# Patient Record
Sex: MALE | Race: WHITE | NOT HISPANIC OR LATINO
[De-identification: names, ages, dates, MRNs, and addresses within clinical notes are randomized per-mention and may not be internally consistent; named-entity substitution may affect disease eponyms.]

---

## 2017-10-18 ENCOUNTER — APPOINTMENT (OUTPATIENT)
Dept: OPHTHALMOLOGY | Facility: CLINIC | Age: 72
End: 2017-10-18

## 2017-11-28 ENCOUNTER — APPOINTMENT (OUTPATIENT)
Dept: OPHTHALMOLOGY | Facility: CLINIC | Age: 72
End: 2017-11-28
Payer: MEDICARE

## 2017-11-28 PROCEDURE — 92015 DETERMINE REFRACTIVE STATE: CPT

## 2017-11-28 PROCEDURE — 92014 COMPRE OPH EXAM EST PT 1/>: CPT

## 2017-11-28 PROCEDURE — 92226: CPT | Mod: 50

## 2019-01-22 ENCOUNTER — APPOINTMENT (OUTPATIENT)
Dept: OPHTHALMOLOGY | Facility: CLINIC | Age: 74
End: 2019-01-22

## 2019-11-20 ENCOUNTER — NON-APPOINTMENT (OUTPATIENT)
Age: 74
End: 2019-11-20

## 2019-11-20 ENCOUNTER — APPOINTMENT (OUTPATIENT)
Dept: OPHTHALMOLOGY | Facility: CLINIC | Age: 74
End: 2019-11-20
Payer: MEDICARE

## 2019-11-20 PROCEDURE — 92014 COMPRE OPH EXAM EST PT 1/>: CPT

## 2020-07-29 ENCOUNTER — APPOINTMENT (OUTPATIENT)
Dept: OPHTHALMOLOGY | Facility: CLINIC | Age: 75
End: 2020-07-29

## 2020-09-02 ENCOUNTER — APPOINTMENT (OUTPATIENT)
Dept: OTOLARYNGOLOGY | Facility: CLINIC | Age: 75
End: 2020-09-02
Payer: MEDICARE

## 2020-09-02 VITALS
HEART RATE: 49 BPM | TEMPERATURE: 97.9 F | HEIGHT: 70 IN | WEIGHT: 250 LBS | SYSTOLIC BLOOD PRESSURE: 122 MMHG | BODY MASS INDEX: 35.79 KG/M2 | DIASTOLIC BLOOD PRESSURE: 58 MMHG

## 2020-09-02 PROCEDURE — 92567 TYMPANOMETRY: CPT

## 2020-09-02 PROCEDURE — 69210 REMOVE IMPACTED EAR WAX UNI: CPT

## 2020-09-02 PROCEDURE — 92557 COMPREHENSIVE HEARING TEST: CPT

## 2020-09-02 PROCEDURE — 99204 OFFICE O/P NEW MOD 45 MIN: CPT | Mod: 25

## 2020-09-02 PROCEDURE — 95992 CANALITH REPOSITIONING PROC: CPT | Mod: RT

## 2020-09-02 NOTE — PHYSICAL EXAM
[Fukuda Step Test] : Fukuda Step Test was Positive [Midline] : trachea located in midline position [Normal] : gait was normal [Denia-Hallsandyke] : [place]-Hallpike: Negative [Nystagmus] : ~T no ~M nystagmus was seen [de-identified] : weak step test [FreeTextEntry8] : wax [FreeTextEntry9] : wax

## 2020-09-02 NOTE — PROCEDURE
[Risk and Benefits Discussed] : The purpose, risks, discomforts, benefits and alternatives of the procedure have been explained to the patient including no treatment. [Cerumen Impaction] : Cerumen Impaction [] : Epley Maneuver [FreeTextEntry1] : Dizzy [FreeTextEntry6] : sl dizzy w/o nystagmus

## 2020-09-02 NOTE — HISTORY OF PRESENT ILLNESS
[Dizziness] : dizziness [Anxiety] : no anxiety [de-identified] : 74 yo male\par Patient complains of vertigo x 1 month. States when he bends down or lays supine the room starts spinning, lasting several seconds. Happens several times a day, last episode was this morning. Denies hearing loss or ear fullness with vertigo. \par Pt has no ear pain, ear drainage, hearing loss, tinnitus, nasal congestion, nasal discharge, epistaxis, sinus infections, facial pain, facial pressure, throat pain, dysphagia or fevers\par \par  [Hearing Loss] : no hearing loss [Headache] : no headache [Recurrent Otitis Media] : no recurrent otitis media [Otitis Media with Effusion] : no otitis media with effusion [Meniere Disease] : no Meniere disease [Congenital Ear Malformation] : no congenital ear malformation [Presbycusis] : no presbycusis [Perilymphatic Fistula] : no perilymphatic fistula [Otosclerosis] : no otosclerosis [Hypertension] : no hypertension [Loud Noise Exposure] : no history of loud noise exposure [Smoking] : no smoking [Stroke] : no stroke [Early Onset Hearing Loss] : no early onset hearing loss [Nasal Congestion] : no nasal congestion [Facial Pressure] : no facial pressure [Facial Pain] : no facial pain [Diplopia] : no diplopia [Ear Fullness] : no ear fullness [Allergic Rhinitis] : no allergic rhinitis [Maxillary Tooth Infection] : no maxillary tooth infection [Environmental Allergies] : no environmental allergies [Seasonal Allergies] : no seasonal allergies [Septal Deviation] : no septal deviation [Environmental Allergens] : no environmental allergens [Adenoidectomy] : no adenoidectomy [Asthma] : no asthma [Allergies] : no allergies [Nasal FB Removal] : no nasal foreign body removal [Neck Mass] : no neck mass [Neck Pain] : no neck pain [Chills] : no chills [Cold Intolerance] : no cold intolerance [Heat Intolerance] : no heat intolerance [Cough] : no cough [Fatigue] : no fatigue [Hyperthyroidism] : no hyperthyroidism [Sialadenitis] : no sialadenitis [Non-Hodgkin Lymphoma] : no non-hodgkin lymphoma [Hodgkin Disease] : no hodgkin disease [Tobacco Use] : no tobacco use [Graves Disease] : no graves disease [Alcohol Use] : no alcohol use [Thyroid Cancer] : no thyroid cancer

## 2020-09-02 NOTE — DATA REVIEWED
[de-identified] : bilat SNHL\par Hearing Test performed to evaluate the extent of hearing loss and  to explain pt's symptoms\par

## 2020-09-02 NOTE — ASSESSMENT
[FreeTextEntry1] : Wax:\par -Cerumen is removed from the right and left  ear canal with a curette and suction.\par -Rx:Debrox be placed in both ears on a routine basis to keep them free of wax.\par -Routine debridement suggested to keep the ears free of wax.\par \par Vestibulopathy:\par -Hearing Test performed to evaluate the extent of hearing loss and to explain pt's symptoms\par bilateral sensorineural hearing loss-cleared for hearing aids\par \par Rx-vestibular rehab \par poss vestib tetsing\par \par f/u

## 2020-10-23 ENCOUNTER — APPOINTMENT (OUTPATIENT)
Dept: OTOLARYNGOLOGY | Facility: CLINIC | Age: 75
End: 2020-10-23

## 2020-11-24 ENCOUNTER — NON-APPOINTMENT (OUTPATIENT)
Age: 75
End: 2020-11-24

## 2020-12-21 ENCOUNTER — APPOINTMENT (OUTPATIENT)
Dept: OTOLARYNGOLOGY | Facility: CLINIC | Age: 75
End: 2020-12-21
Payer: MEDICARE

## 2020-12-21 VITALS
TEMPERATURE: 97.3 F | HEIGHT: 70 IN | BODY MASS INDEX: 35.79 KG/M2 | DIASTOLIC BLOOD PRESSURE: 55 MMHG | HEART RATE: 48 BPM | SYSTOLIC BLOOD PRESSURE: 103 MMHG | WEIGHT: 250 LBS

## 2020-12-21 PROCEDURE — 99214 OFFICE O/P EST MOD 30 MIN: CPT

## 2020-12-21 RX ORDER — AMIODARONE HYDROCHLORIDE 400 MG/1
400 TABLET ORAL
Qty: 90 | Refills: 0 | Status: ACTIVE | COMMUNITY
Start: 2019-11-18

## 2020-12-21 RX ORDER — TAMSULOSIN HYDROCHLORIDE 0.4 MG/1
0.4 CAPSULE ORAL
Qty: 180 | Refills: 0 | Status: ACTIVE | COMMUNITY
Start: 2020-11-06

## 2020-12-21 RX ORDER — CANDESARTAN CILEXETIL 4 MG/1
4 TABLET ORAL
Qty: 90 | Refills: 0 | Status: ACTIVE | COMMUNITY
Start: 2020-09-09

## 2020-12-21 RX ORDER — MUPIROCIN 20 MG/G
2 OINTMENT TOPICAL
Qty: 22 | Refills: 0 | Status: ACTIVE | COMMUNITY
Start: 2020-10-23

## 2020-12-21 NOTE — HISTORY OF PRESENT ILLNESS
[Dizziness] : dizziness [No] : patient does not have a  history of radiation therapy [de-identified] : 76 yo male with vertigo presents for follow up. States he is going to vestibular therapy, states he is making 20% progress. Still not feeling his best but better. \par no other modifying factors\par \par \par \par \par  [Anxiety] : no anxiety [Headache] : no headache [Hearing Loss] : no hearing loss [Recurrent Otitis Media] : no recurrent otitis media [Otitis Media with Effusion] : no otitis media with effusion [Presbycusis] : no presbycusis [Congenital Ear Malformation] : no congenital ear malformation [Meniere Disease] : no Meniere disease [Otosclerosis] : no otosclerosis [Perilymphatic Fistula] : no perilymphatic fistula [Hypertension] : no hypertension [Loud Noise Exposure] : no history of loud noise exposure [Smoking] : no smoking [Early Onset Hearing Loss] : no early onset hearing loss [Stroke] : no stroke [Facial Pain] : no facial pain [Facial Pressure] : no facial pressure [Nasal Congestion] : no nasal congestion [Diplopia] : no diplopia [Ear Fullness] : no ear fullness [Allergic Rhinitis] : no allergic rhinitis [Maxillary Tooth Infection] : no maxillary tooth infection [Septal Deviation] : no septal deviation [Environmental Allergies] : no environmental allergies [Seasonal Allergies] : no seasonal allergies [Environmental Allergens] : no environmental allergens [Adenoidectomy] : no adenoidectomy [Nasal FB Removal] : no nasal foreign body removal [Allergies] : no allergies [Asthma] : no asthma [Neck Mass] : no neck mass [Neck Pain] : no neck pain [Chills] : no chills [Cold Intolerance] : no cold intolerance [Cough] : no cough [Fatigue] : no fatigue [Heat Intolerance] : no heat intolerance [Hyperthyroidism] : no hyperthyroidism [Sialadenitis] : no sialadenitis [Hodgkin Disease] : no hodgkin disease [Non-Hodgkin Lymphoma] : no non-hodgkin lymphoma [Tobacco Use] : no tobacco use [Alcohol Use] : no alcohol use [Graves Disease] : no graves disease [Thyroid Cancer] : no thyroid cancer

## 2020-12-21 NOTE — DATA REVIEWED
[de-identified] : bilat SNHL\par Hearing Test performed to evaluate the extent of hearing loss and  to explain pt's symptoms\par

## 2020-12-21 NOTE — PHYSICAL EXAM
[Midline] : trachea located in midline position [Normal] : gait was normal [Fukuda Step Test] : Fukuda Step Test was Positive [Nystagmus] : ~T no ~M nystagmus was seen [Denia-Hallsandyke] : [place]-Hallpike: Negative [de-identified] : weak step test

## 2020-12-21 NOTE — ASSESSMENT
[FreeTextEntry1] : 76 y/o male with vertigo going to vestibular rehab \par Vestibulopathy:\par -continue vestibular rehab \par \par \par f/u 2 months or  prn

## 2021-05-13 ENCOUNTER — APPOINTMENT (OUTPATIENT)
Dept: OTOLARYNGOLOGY | Facility: CLINIC | Age: 76
End: 2021-05-13
Payer: MEDICARE

## 2021-05-13 VITALS
SYSTOLIC BLOOD PRESSURE: 124 MMHG | TEMPERATURE: 97.6 F | DIASTOLIC BLOOD PRESSURE: 66 MMHG | WEIGHT: 250 LBS | BODY MASS INDEX: 35.79 KG/M2 | HEIGHT: 70 IN | HEART RATE: 50 BPM

## 2021-05-13 DIAGNOSIS — H81.11 BENIGN PAROXYSMAL VERTIGO, RIGHT EAR: ICD-10-CM

## 2021-05-13 DIAGNOSIS — H90.3 SENSORINEURAL HEARING LOSS, BILATERAL: ICD-10-CM

## 2021-05-13 DIAGNOSIS — R42 DIZZINESS AND GIDDINESS: ICD-10-CM

## 2021-05-13 DIAGNOSIS — H61.23 IMPACTED CERUMEN, BILATERAL: ICD-10-CM

## 2021-05-13 PROCEDURE — 99213 OFFICE O/P EST LOW 20 MIN: CPT

## 2021-05-13 NOTE — END OF VISIT
[FreeTextEntry3] : I personally saw and examined FARHAT ROMO in detail. I spoke to MAURICE Bliss regarding the assessment and plan of care. I reviewed the above assessment and plan of care, and agree. I have made changes in changes in the body of the note where appropriate.I personally reviewed the HPI, PMH, FH, SH, ROS and medications/allergies. I have spoken to MAURICE Bliss regarding the history and have personally determined the assessment and plan of care, and documented this myself. I was present and participated in all key portions of the encounter and all procedures noted above. I have made changes in the body of the note where appropriate.\par \par Attesting Faculty: See Attending Signature Below \par \par \par  [Time Spent: ___ minutes] : I have spent [unfilled] minutes of time on the encounter.

## 2021-05-13 NOTE — ASSESSMENT
[FreeTextEntry1] : 76 y/o male with vertigo presents for follow up, no improvement with vestibular rehab\par \par Vestibulopathy:\par -VNG and ABR ordered \par -Cawthorne's exercise advised \par \par f/u 1 month or prn \par

## 2021-05-13 NOTE — DATA REVIEWED
[de-identified] : bilat SNHL\par Hearing Test performed to evaluate the extent of hearing loss and  to explain pt's symptoms\par

## 2021-05-13 NOTE — PHYSICAL EXAM
[Midline] : trachea located in midline position [Normal] : gait was normal [Fukuda Step Test] : Fukuda Step Test was Positive [Nystagmus] : ~T no ~M nystagmus was seen [Denia-Hallsandyke] : [place]-Hallpike: Negative [de-identified] : weak step test

## 2021-05-13 NOTE — HISTORY OF PRESENT ILLNESS
[No] : patient does not have a  history of radiation therapy [Dizziness] : dizziness [de-identified] : 76 yo male with vertigo presents for follow up. States he went for vestibular rehab with no improvement of dizziness. He states its more pronounced on the right side. \par no other modifying factors\par \par \par \par \par  [Anxiety] : no anxiety [Headache] : no headache [Hearing Loss] : no hearing loss [Recurrent Otitis Media] : no recurrent otitis media [Otitis Media with Effusion] : no otitis media with effusion [Presbycusis] : no presbycusis [Congenital Ear Malformation] : no congenital ear malformation [Meniere Disease] : no Meniere disease [Otosclerosis] : no otosclerosis [Perilymphatic Fistula] : no perilymphatic fistula [Hypertension] : no hypertension [Loud Noise Exposure] : no history of loud noise exposure [Smoking] : no smoking [Early Onset Hearing Loss] : no early onset hearing loss [Stroke] : no stroke [Facial Pain] : no facial pain [Facial Pressure] : no facial pressure [Nasal Congestion] : no nasal congestion [Diplopia] : no diplopia [Ear Fullness] : no ear fullness [Allergic Rhinitis] : no allergic rhinitis [Maxillary Tooth Infection] : no maxillary tooth infection [Septal Deviation] : no septal deviation [Environmental Allergies] : no environmental allergies [Seasonal Allergies] : no seasonal allergies [Environmental Allergens] : no environmental allergens [Adenoidectomy] : no adenoidectomy [Nasal FB Removal] : no nasal foreign body removal [Allergies] : no allergies [Asthma] : no asthma [Neck Mass] : no neck mass [Neck Pain] : no neck pain [Chills] : no chills [Cold Intolerance] : no cold intolerance [Cough] : no cough [Fatigue] : no fatigue [Heat Intolerance] : no heat intolerance [Hyperthyroidism] : no hyperthyroidism [Sialadenitis] : no sialadenitis [Hodgkin Disease] : no hodgkin disease [Non-Hodgkin Lymphoma] : no non-hodgkin lymphoma [Tobacco Use] : no tobacco use [Alcohol Use] : no alcohol use [Graves Disease] : no graves disease [Thyroid Cancer] : no thyroid cancer

## 2021-06-04 ENCOUNTER — APPOINTMENT (OUTPATIENT)
Dept: OTOLARYNGOLOGY | Facility: CLINIC | Age: 76
End: 2021-06-04
Payer: MEDICARE

## 2021-06-04 PROCEDURE — ZZZZZ: CPT

## 2021-06-04 PROCEDURE — 92537 CALORIC VSTBLR TEST W/REC: CPT

## 2021-06-04 PROCEDURE — 92567 TYMPANOMETRY: CPT

## 2021-06-04 PROCEDURE — 92540 BASIC VESTIBULAR EVALUATION: CPT

## 2021-06-04 PROCEDURE — 92653 AEP NEURODIAGNOSTIC I&R: CPT

## 2021-06-04 PROCEDURE — 92547 SUPPLEMENTAL ELECTRICAL TEST: CPT

## 2021-06-11 ENCOUNTER — NON-APPOINTMENT (OUTPATIENT)
Age: 76
End: 2021-06-11

## 2021-08-18 ENCOUNTER — APPOINTMENT (OUTPATIENT)
Dept: HEPATOLOGY | Facility: CLINIC | Age: 76
End: 2021-08-18
Payer: MEDICARE

## 2021-08-18 DIAGNOSIS — R74.8 ABNORMAL LEVELS OF OTHER SERUM ENZYMES: ICD-10-CM

## 2021-08-18 PROCEDURE — 91200 LIVER ELASTOGRAPHY: CPT

## 2021-10-04 ENCOUNTER — APPOINTMENT (OUTPATIENT)
Dept: HEPATOLOGY | Facility: CLINIC | Age: 76
End: 2021-10-04

## 2023-11-30 ENCOUNTER — EMERGENCY (EMERGENCY)
Facility: HOSPITAL | Age: 78
LOS: 1 days | Discharge: HOME | End: 2023-11-30
Attending: STUDENT IN AN ORGANIZED HEALTH CARE EDUCATION/TRAINING PROGRAM
Payer: MEDICARE

## 2023-11-30 VITALS
DIASTOLIC BLOOD PRESSURE: 76 MMHG | HEART RATE: 64 BPM | SYSTOLIC BLOOD PRESSURE: 132 MMHG | HEIGHT: 67 IN | TEMPERATURE: 97 F | RESPIRATION RATE: 18 BRPM | OXYGEN SATURATION: 98 % | WEIGHT: 198.42 LBS

## 2023-11-30 DIAGNOSIS — Z98.89 OTHER SPECIFIED POSTPROCEDURAL STATES: Chronic | ICD-10-CM

## 2023-11-30 LAB
ALBUMIN SERPL ELPH-MCNC: 3.6 G/DL — SIGNIFICANT CHANGE UP (ref 3.5–5)
ALBUMIN SERPL ELPH-MCNC: 3.6 G/DL — SIGNIFICANT CHANGE UP (ref 3.5–5)
ALP SERPL-CCNC: 92 U/L — SIGNIFICANT CHANGE UP (ref 40–120)
ALP SERPL-CCNC: 92 U/L — SIGNIFICANT CHANGE UP (ref 40–120)
ALT FLD-CCNC: 106 U/L DA — HIGH (ref 10–60)
ALT FLD-CCNC: 106 U/L DA — HIGH (ref 10–60)
ANION GAP SERPL CALC-SCNC: 4 MMOL/L — LOW (ref 5–17)
ANION GAP SERPL CALC-SCNC: 4 MMOL/L — LOW (ref 5–17)
APPEARANCE UR: CLEAR — SIGNIFICANT CHANGE UP
APPEARANCE UR: CLEAR — SIGNIFICANT CHANGE UP
APTT BLD: 64.4 SEC — HIGH (ref 24.5–35.6)
APTT BLD: 64.4 SEC — HIGH (ref 24.5–35.6)
AST SERPL-CCNC: 74 U/L — HIGH (ref 10–40)
AST SERPL-CCNC: 74 U/L — HIGH (ref 10–40)
BACTERIA # UR AUTO: ABNORMAL /HPF
BACTERIA # UR AUTO: ABNORMAL /HPF
BASOPHILS # BLD AUTO: 0.06 K/UL — SIGNIFICANT CHANGE UP (ref 0–0.2)
BASOPHILS # BLD AUTO: 0.06 K/UL — SIGNIFICANT CHANGE UP (ref 0–0.2)
BASOPHILS NFR BLD AUTO: 0.5 % — SIGNIFICANT CHANGE UP (ref 0–2)
BASOPHILS NFR BLD AUTO: 0.5 % — SIGNIFICANT CHANGE UP (ref 0–2)
BILIRUB SERPL-MCNC: 0.6 MG/DL — SIGNIFICANT CHANGE UP (ref 0.2–1.2)
BILIRUB SERPL-MCNC: 0.6 MG/DL — SIGNIFICANT CHANGE UP (ref 0.2–1.2)
BILIRUB UR-MCNC: NEGATIVE — SIGNIFICANT CHANGE UP
BILIRUB UR-MCNC: NEGATIVE — SIGNIFICANT CHANGE UP
BUN SERPL-MCNC: 29 MG/DL — HIGH (ref 7–18)
BUN SERPL-MCNC: 29 MG/DL — HIGH (ref 7–18)
CALCIUM SERPL-MCNC: 9.5 MG/DL — SIGNIFICANT CHANGE UP (ref 8.4–10.5)
CALCIUM SERPL-MCNC: 9.5 MG/DL — SIGNIFICANT CHANGE UP (ref 8.4–10.5)
CHLORIDE SERPL-SCNC: 106 MMOL/L — SIGNIFICANT CHANGE UP (ref 96–108)
CHLORIDE SERPL-SCNC: 106 MMOL/L — SIGNIFICANT CHANGE UP (ref 96–108)
CO2 SERPL-SCNC: 28 MMOL/L — SIGNIFICANT CHANGE UP (ref 22–31)
CO2 SERPL-SCNC: 28 MMOL/L — SIGNIFICANT CHANGE UP (ref 22–31)
COLOR SPEC: YELLOW — SIGNIFICANT CHANGE UP
COLOR SPEC: YELLOW — SIGNIFICANT CHANGE UP
CREAT SERPL-MCNC: 1.53 MG/DL — HIGH (ref 0.5–1.3)
CREAT SERPL-MCNC: 1.53 MG/DL — HIGH (ref 0.5–1.3)
DIFF PNL FLD: ABNORMAL
DIFF PNL FLD: ABNORMAL
EGFR: 47 ML/MIN/1.73M2 — LOW
EGFR: 47 ML/MIN/1.73M2 — LOW
EOSINOPHIL # BLD AUTO: 0.16 K/UL — SIGNIFICANT CHANGE UP (ref 0–0.5)
EOSINOPHIL # BLD AUTO: 0.16 K/UL — SIGNIFICANT CHANGE UP (ref 0–0.5)
EOSINOPHIL NFR BLD AUTO: 1.4 % — SIGNIFICANT CHANGE UP (ref 0–6)
EOSINOPHIL NFR BLD AUTO: 1.4 % — SIGNIFICANT CHANGE UP (ref 0–6)
EPI CELLS # UR: SIGNIFICANT CHANGE UP
EPI CELLS # UR: SIGNIFICANT CHANGE UP
GLUCOSE SERPL-MCNC: 196 MG/DL — HIGH (ref 70–99)
GLUCOSE SERPL-MCNC: 196 MG/DL — HIGH (ref 70–99)
GLUCOSE UR QL: >=1000 MG/DL
GLUCOSE UR QL: >=1000 MG/DL
HCT VFR BLD CALC: 54 % — HIGH (ref 39–50)
HCT VFR BLD CALC: 54 % — HIGH (ref 39–50)
HGB BLD-MCNC: 18.1 G/DL — HIGH (ref 13–17)
HGB BLD-MCNC: 18.1 G/DL — HIGH (ref 13–17)
IMM GRANULOCYTES NFR BLD AUTO: 0.7 % — SIGNIFICANT CHANGE UP (ref 0–0.9)
IMM GRANULOCYTES NFR BLD AUTO: 0.7 % — SIGNIFICANT CHANGE UP (ref 0–0.9)
INR BLD: 3.86 RATIO — HIGH (ref 0.85–1.18)
INR BLD: 3.86 RATIO — HIGH (ref 0.85–1.18)
KETONES UR-MCNC: NEGATIVE MG/DL — SIGNIFICANT CHANGE UP
KETONES UR-MCNC: NEGATIVE MG/DL — SIGNIFICANT CHANGE UP
LEUKOCYTE ESTERASE UR-ACNC: ABNORMAL
LEUKOCYTE ESTERASE UR-ACNC: ABNORMAL
LYMPHOCYTES # BLD AUTO: 2.55 K/UL — SIGNIFICANT CHANGE UP (ref 1–3.3)
LYMPHOCYTES # BLD AUTO: 2.55 K/UL — SIGNIFICANT CHANGE UP (ref 1–3.3)
LYMPHOCYTES # BLD AUTO: 22.9 % — SIGNIFICANT CHANGE UP (ref 13–44)
LYMPHOCYTES # BLD AUTO: 22.9 % — SIGNIFICANT CHANGE UP (ref 13–44)
MCHC RBC-ENTMCNC: 32.8 PG — SIGNIFICANT CHANGE UP (ref 27–34)
MCHC RBC-ENTMCNC: 32.8 PG — SIGNIFICANT CHANGE UP (ref 27–34)
MCHC RBC-ENTMCNC: 33.5 GM/DL — SIGNIFICANT CHANGE UP (ref 32–36)
MCHC RBC-ENTMCNC: 33.5 GM/DL — SIGNIFICANT CHANGE UP (ref 32–36)
MCV RBC AUTO: 98 FL — SIGNIFICANT CHANGE UP (ref 80–100)
MCV RBC AUTO: 98 FL — SIGNIFICANT CHANGE UP (ref 80–100)
MONOCYTES # BLD AUTO: 0.94 K/UL — HIGH (ref 0–0.9)
MONOCYTES # BLD AUTO: 0.94 K/UL — HIGH (ref 0–0.9)
MONOCYTES NFR BLD AUTO: 8.4 % — SIGNIFICANT CHANGE UP (ref 2–14)
MONOCYTES NFR BLD AUTO: 8.4 % — SIGNIFICANT CHANGE UP (ref 2–14)
NEUTROPHILS # BLD AUTO: 7.34 K/UL — SIGNIFICANT CHANGE UP (ref 1.8–7.4)
NEUTROPHILS # BLD AUTO: 7.34 K/UL — SIGNIFICANT CHANGE UP (ref 1.8–7.4)
NEUTROPHILS NFR BLD AUTO: 66.1 % — SIGNIFICANT CHANGE UP (ref 43–77)
NEUTROPHILS NFR BLD AUTO: 66.1 % — SIGNIFICANT CHANGE UP (ref 43–77)
NITRITE UR-MCNC: NEGATIVE — SIGNIFICANT CHANGE UP
NITRITE UR-MCNC: NEGATIVE — SIGNIFICANT CHANGE UP
NRBC # BLD: 0 /100 WBCS — SIGNIFICANT CHANGE UP (ref 0–0)
NRBC # BLD: 0 /100 WBCS — SIGNIFICANT CHANGE UP (ref 0–0)
PH UR: 5 — SIGNIFICANT CHANGE UP (ref 5–8)
PH UR: 5 — SIGNIFICANT CHANGE UP (ref 5–8)
PLATELET # BLD AUTO: 216 K/UL — SIGNIFICANT CHANGE UP (ref 150–400)
PLATELET # BLD AUTO: 216 K/UL — SIGNIFICANT CHANGE UP (ref 150–400)
POTASSIUM SERPL-MCNC: 4.1 MMOL/L — SIGNIFICANT CHANGE UP (ref 3.5–5.3)
POTASSIUM SERPL-MCNC: 4.1 MMOL/L — SIGNIFICANT CHANGE UP (ref 3.5–5.3)
POTASSIUM SERPL-SCNC: 4.1 MMOL/L — SIGNIFICANT CHANGE UP (ref 3.5–5.3)
POTASSIUM SERPL-SCNC: 4.1 MMOL/L — SIGNIFICANT CHANGE UP (ref 3.5–5.3)
PROT SERPL-MCNC: 7.5 G/DL — SIGNIFICANT CHANGE UP (ref 6–8.3)
PROT SERPL-MCNC: 7.5 G/DL — SIGNIFICANT CHANGE UP (ref 6–8.3)
PROT UR-MCNC: NEGATIVE MG/DL — SIGNIFICANT CHANGE UP
PROT UR-MCNC: NEGATIVE MG/DL — SIGNIFICANT CHANGE UP
PROTHROM AB SERPL-ACNC: 42.3 SEC — HIGH (ref 9.5–13)
PROTHROM AB SERPL-ACNC: 42.3 SEC — HIGH (ref 9.5–13)
RBC # BLD: 5.51 M/UL — SIGNIFICANT CHANGE UP (ref 4.2–5.8)
RBC # BLD: 5.51 M/UL — SIGNIFICANT CHANGE UP (ref 4.2–5.8)
RBC # FLD: 15.9 % — HIGH (ref 10.3–14.5)
RBC # FLD: 15.9 % — HIGH (ref 10.3–14.5)
RBC CASTS # UR COMP ASSIST: 10 /HPF — HIGH (ref 0–4)
RBC CASTS # UR COMP ASSIST: 10 /HPF — HIGH (ref 0–4)
SODIUM SERPL-SCNC: 138 MMOL/L — SIGNIFICANT CHANGE UP (ref 135–145)
SODIUM SERPL-SCNC: 138 MMOL/L — SIGNIFICANT CHANGE UP (ref 135–145)
SP GR SPEC: 1.02 — SIGNIFICANT CHANGE UP (ref 1–1.03)
SP GR SPEC: 1.02 — SIGNIFICANT CHANGE UP (ref 1–1.03)
UROBILINOGEN FLD QL: 1 MG/DL — SIGNIFICANT CHANGE UP (ref 0.2–1)
UROBILINOGEN FLD QL: 1 MG/DL — SIGNIFICANT CHANGE UP (ref 0.2–1)
WBC # BLD: 11.13 K/UL — HIGH (ref 3.8–10.5)
WBC # BLD: 11.13 K/UL — HIGH (ref 3.8–10.5)
WBC # FLD AUTO: 11.13 K/UL — HIGH (ref 3.8–10.5)
WBC # FLD AUTO: 11.13 K/UL — HIGH (ref 3.8–10.5)
WBC UR QL: 7 /HPF — HIGH (ref 0–5)
WBC UR QL: 7 /HPF — HIGH (ref 0–5)

## 2023-11-30 PROCEDURE — 85610 PROTHROMBIN TIME: CPT

## 2023-11-30 PROCEDURE — 87086 URINE CULTURE/COLONY COUNT: CPT

## 2023-11-30 PROCEDURE — 81001 URINALYSIS AUTO W/SCOPE: CPT

## 2023-11-30 PROCEDURE — 51702 INSERT TEMP BLADDER CATH: CPT

## 2023-11-30 PROCEDURE — 99284 EMERGENCY DEPT VISIT MOD MDM: CPT

## 2023-11-30 PROCEDURE — 36415 COLL VENOUS BLD VENIPUNCTURE: CPT

## 2023-11-30 PROCEDURE — 80053 COMPREHEN METABOLIC PANEL: CPT

## 2023-11-30 PROCEDURE — 85730 THROMBOPLASTIN TIME PARTIAL: CPT

## 2023-11-30 PROCEDURE — 85025 COMPLETE CBC W/AUTO DIFF WBC: CPT

## 2023-11-30 PROCEDURE — 99283 EMERGENCY DEPT VISIT LOW MDM: CPT | Mod: 25

## 2023-11-30 NOTE — ED PROVIDER NOTE - CLINICAL SUMMARY MEDICAL DECISION MAKING FREE TEXT BOX
Patient presenting for retention. will change castellano, assess urine and kidney function. ed obs and reassess

## 2023-11-30 NOTE — ED ADULT TRIAGE NOTE - CHIEF COMPLAINT QUOTE
Starr cath blocking since in the morning, c/o lower abdominal pain, unable to urinate since in the morning,

## 2023-11-30 NOTE — ED PROVIDER NOTE - PROGRESS NOTE DETAILS
No Patient endorse baseline kidney issues, state he will fu his nephrologist concern for creatinien of 1.5. clinically well. retention resolved with castellano replacement. given return precaution and instructed to fu pmd. ua low suspicion for uti. culture sent

## 2023-11-30 NOTE — ED PROVIDER NOTE - NSFOLLOWUPINSTRUCTIONS_ED_ALL_ED_FT
Acute Urinary Retention, Male    Acute urinary retention is a condition in which a person is unable to pass urine or can only pass a little urine. This condition can happen suddenly and last for a short time. If left untreated, it can become long-term (chronic) and result in kidney damage or other serious complications.    What are the causes?  This condition may be caused by:  Obstruction or narrowing of the tube that drains the bladder (urethra). This may be caused by surgery, problems with nearby organs, or injury to the bladder or urethra.  Problems with the nerves in the bladder.  Tumors in the area of the pelvis, bladder, or urethra.  Certain medicines.  Bladder or urinary tract infection.  Constipation.  What increases the risk?  This condition is more likely to develop in older men. As men age, their prostate may become larger and may start to press or squeeze on the bladder or the urethra. Other chronic health conditions can increase the risk of acute urinary retention. These include:  Diseases such as multiple sclerosis.  Spinal cord injuries.  Diabetes.  Degenerative cognitive conditions, such as delirium or dementia.  Psychological conditions. A man may hold his urine due to trauma or because he does not want to use the bathroom.  What are the signs or symptoms?  Symptoms of this condition include:  Trouble urinating.  Pain in the lower abdomen.  How is this diagnosed?  This condition is diagnosed based on a physical exam and your medical history. You may also have other tests, including:  An ultrasound of the bladder or kidneys or both.  Blood tests.  A urine analysis.  Additional tests may be needed, such as a CT scan, MRI, and kidney or bladder function tests.  How is this treated?  Treatment for this condition may include:  Medicines.  Placing a thin, sterile tube (catheter) into the bladder to drain urine out of the body. This is called an indwelling urinary catheter. After it is inserted, the catheter is held in place with a small balloon that is filled with sterile water. Urine drains from the catheter into a collection bag outside of the body.  Behavioral therapy.  Treatment for other conditions.  If needed, you may be treated in the hospital for kidney function problems or to manage other complications.    Follow these instructions at home:  Medicines    Take over-the-counter and prescription medicines only as told by your health care provider. Avoid certain medicines, such as decongestants, antihistamines, and some prescription medicines. Do not take any medicine unless your health care provider approves.  If you were prescribed an antibiotic medicine, take it as told by your health care provider. Do not stop using the antibiotic even if you start to feel better.  General instructions    Do not use any products that contain nicotine or tobacco. These products include cigarettes, chewing tobacco, and vaping devices, such as e-cigarettes. If you need help quitting, ask your health care provider.  Drink enough fluid to keep your urine pale yellow.  If you have an indwelling urinary catheter, follow the instructions from your health care provider.  Monitor any changes in your symptoms. Tell your health care provider about any changes.  If instructed, monitor your blood pressure at home. Report changes as told by your health care provider.  Keep all follow-up visits. This is important.  Contact a health care provider if:  You have uncomfortable bladder contractions that you cannot control (spasms).  You leak urine with the spasms.  Get help right away if:  You have chills or a fever.  You have blood in your urine.  You have a catheter and the following happens:  Your catheter stops draining urine.  Your catheter falls out.  Summary  Acute urinary retention is a condition in which a person is unable to pass urine or can only pass a little urine. If left untreated, this condition can result in kidney damage or other serious complications.  An enlarged prostate may cause this condition. As men age, their prostate gland may become larger and may press or squeeze on the bladder or the urethra.  Treatment for this condition may include medicines and placement of an indwelling urinary catheter.  Monitor any changes in your symptoms. Tell your health care provider about any changes.  This information is not intended to replace advice given to you by your health care provider. Make sure you discuss any questions you have with your health care provider.

## 2023-11-30 NOTE — ED PROVIDER NOTE - PATIENT PORTAL LINK FT
You can access the FollowMyHealth Patient Portal offered by [facility] by registering at the following website: [email or web address]. By joining [facility]’s FollowMyHealth portal, you will also be able to view your health information using other applications (apps) compatible with our system.

## 2023-11-30 NOTE — ED PROVIDER NOTE - OBJECTIVE STATEMENT
78 y.o presenting with urinary retention. history of castellano cath. endorses that cath no longer draining. denies other symptoms. endorses feeling bladder fullness

## 2023-12-02 LAB
CULTURE RESULTS: SIGNIFICANT CHANGE UP
CULTURE RESULTS: SIGNIFICANT CHANGE UP
SPECIMEN SOURCE: SIGNIFICANT CHANGE UP
SPECIMEN SOURCE: SIGNIFICANT CHANGE UP

## 2023-12-08 ENCOUNTER — EMERGENCY (EMERGENCY)
Facility: HOSPITAL | Age: 78
LOS: 1 days | Discharge: HOME | End: 2023-12-08
Attending: STUDENT IN AN ORGANIZED HEALTH CARE EDUCATION/TRAINING PROGRAM
Payer: MEDICARE

## 2023-12-08 VITALS
OXYGEN SATURATION: 98 % | RESPIRATION RATE: 20 BRPM | TEMPERATURE: 98 F | SYSTOLIC BLOOD PRESSURE: 151 MMHG | WEIGHT: 207.9 LBS | HEIGHT: 67 IN | DIASTOLIC BLOOD PRESSURE: 78 MMHG | HEART RATE: 68 BPM

## 2023-12-08 DIAGNOSIS — Z98.89 OTHER SPECIFIED POSTPROCEDURAL STATES: Chronic | ICD-10-CM

## 2023-12-08 PROCEDURE — 99284 EMERGENCY DEPT VISIT MOD MDM: CPT

## 2023-12-08 NOTE — ED ADULT TRIAGE NOTE - STATUS:
"Patient shares he has been under a lot of stress after his father passed away.  He has been taking Lantus 15 units at bedtime and notes, \"my numbers have been pretty good.\"    He continues to hold Humalog and has discontinued NPH.    BG report:   - 150 and 2-hr PP/bedtime BG average 190 mg/dL.    Recommend Lantus increase from 15 to 17 units every evening at bedtime.     Patient states he will schedule follow up visit once things calm down.    Any diabetes medication dose changes were made via the CDE Protocol and Collaborative Practice Agreement with the patient's primary care provider.     Tosha Bailey RN, BSN, CDE  8/29/2019 5:37 PM   " Applied

## 2023-12-09 VITALS
DIASTOLIC BLOOD PRESSURE: 67 MMHG | RESPIRATION RATE: 18 BRPM | OXYGEN SATURATION: 100 % | HEART RATE: 99 BPM | SYSTOLIC BLOOD PRESSURE: 110 MMHG | TEMPERATURE: 98 F

## 2023-12-09 LAB
ALBUMIN SERPL ELPH-MCNC: 3.2 G/DL — LOW (ref 3.5–5)
ALBUMIN SERPL ELPH-MCNC: 3.2 G/DL — LOW (ref 3.5–5)
ALP SERPL-CCNC: 80 U/L — SIGNIFICANT CHANGE UP (ref 40–120)
ALP SERPL-CCNC: 80 U/L — SIGNIFICANT CHANGE UP (ref 40–120)
ALT FLD-CCNC: 83 U/L DA — HIGH (ref 10–60)
ALT FLD-CCNC: 83 U/L DA — HIGH (ref 10–60)
ANION GAP SERPL CALC-SCNC: 6 MMOL/L — SIGNIFICANT CHANGE UP (ref 5–17)
ANION GAP SERPL CALC-SCNC: 6 MMOL/L — SIGNIFICANT CHANGE UP (ref 5–17)
APPEARANCE UR: CLEAR — SIGNIFICANT CHANGE UP
APPEARANCE UR: CLEAR — SIGNIFICANT CHANGE UP
APTT BLD: 64.4 SEC — HIGH (ref 24.5–35.6)
APTT BLD: 64.4 SEC — HIGH (ref 24.5–35.6)
AST SERPL-CCNC: 54 U/L — HIGH (ref 10–40)
AST SERPL-CCNC: 54 U/L — HIGH (ref 10–40)
BACTERIA # UR AUTO: ABNORMAL /HPF
BACTERIA # UR AUTO: ABNORMAL /HPF
BASOPHILS # BLD AUTO: 0.07 K/UL — SIGNIFICANT CHANGE UP (ref 0–0.2)
BASOPHILS # BLD AUTO: 0.07 K/UL — SIGNIFICANT CHANGE UP (ref 0–0.2)
BASOPHILS NFR BLD AUTO: 0.6 % — SIGNIFICANT CHANGE UP (ref 0–2)
BASOPHILS NFR BLD AUTO: 0.6 % — SIGNIFICANT CHANGE UP (ref 0–2)
BILIRUB SERPL-MCNC: 0.6 MG/DL — SIGNIFICANT CHANGE UP (ref 0.2–1.2)
BILIRUB SERPL-MCNC: 0.6 MG/DL — SIGNIFICANT CHANGE UP (ref 0.2–1.2)
BILIRUB UR-MCNC: NEGATIVE — SIGNIFICANT CHANGE UP
BILIRUB UR-MCNC: NEGATIVE — SIGNIFICANT CHANGE UP
BLD GP AB SCN SERPL QL: SIGNIFICANT CHANGE UP
BLD GP AB SCN SERPL QL: SIGNIFICANT CHANGE UP
BUN SERPL-MCNC: 25 MG/DL — HIGH (ref 7–18)
BUN SERPL-MCNC: 25 MG/DL — HIGH (ref 7–18)
CALCIUM SERPL-MCNC: 9.3 MG/DL — SIGNIFICANT CHANGE UP (ref 8.4–10.5)
CALCIUM SERPL-MCNC: 9.3 MG/DL — SIGNIFICANT CHANGE UP (ref 8.4–10.5)
CHLORIDE SERPL-SCNC: 108 MMOL/L — SIGNIFICANT CHANGE UP (ref 96–108)
CHLORIDE SERPL-SCNC: 108 MMOL/L — SIGNIFICANT CHANGE UP (ref 96–108)
CO2 SERPL-SCNC: 24 MMOL/L — SIGNIFICANT CHANGE UP (ref 22–31)
CO2 SERPL-SCNC: 24 MMOL/L — SIGNIFICANT CHANGE UP (ref 22–31)
COLOR SPEC: YELLOW — SIGNIFICANT CHANGE UP
COLOR SPEC: YELLOW — SIGNIFICANT CHANGE UP
CREAT SERPL-MCNC: 1.11 MG/DL — SIGNIFICANT CHANGE UP (ref 0.5–1.3)
CREAT SERPL-MCNC: 1.11 MG/DL — SIGNIFICANT CHANGE UP (ref 0.5–1.3)
DIFF PNL FLD: ABNORMAL
DIFF PNL FLD: ABNORMAL
EGFR: 68 ML/MIN/1.73M2 — SIGNIFICANT CHANGE UP
EGFR: 68 ML/MIN/1.73M2 — SIGNIFICANT CHANGE UP
EOSINOPHIL # BLD AUTO: 0.16 K/UL — SIGNIFICANT CHANGE UP (ref 0–0.5)
EOSINOPHIL # BLD AUTO: 0.16 K/UL — SIGNIFICANT CHANGE UP (ref 0–0.5)
EOSINOPHIL NFR BLD AUTO: 1.3 % — SIGNIFICANT CHANGE UP (ref 0–6)
EOSINOPHIL NFR BLD AUTO: 1.3 % — SIGNIFICANT CHANGE UP (ref 0–6)
EPI CELLS # UR: PRESENT
EPI CELLS # UR: PRESENT
GLUCOSE SERPL-MCNC: 120 MG/DL — HIGH (ref 70–99)
GLUCOSE SERPL-MCNC: 120 MG/DL — HIGH (ref 70–99)
GLUCOSE UR QL: >=1000 MG/DL
GLUCOSE UR QL: >=1000 MG/DL
HCT VFR BLD CALC: 51.9 % — HIGH (ref 39–50)
HCT VFR BLD CALC: 51.9 % — HIGH (ref 39–50)
HGB BLD-MCNC: 17.6 G/DL — HIGH (ref 13–17)
HGB BLD-MCNC: 17.6 G/DL — HIGH (ref 13–17)
IMM GRANULOCYTES NFR BLD AUTO: 0.6 % — SIGNIFICANT CHANGE UP (ref 0–0.9)
IMM GRANULOCYTES NFR BLD AUTO: 0.6 % — SIGNIFICANT CHANGE UP (ref 0–0.9)
INR BLD: 4.39 RATIO — HIGH (ref 0.85–1.18)
INR BLD: 4.39 RATIO — HIGH (ref 0.85–1.18)
KETONES UR-MCNC: NEGATIVE MG/DL — SIGNIFICANT CHANGE UP
KETONES UR-MCNC: NEGATIVE MG/DL — SIGNIFICANT CHANGE UP
LEUKOCYTE ESTERASE UR-ACNC: NEGATIVE — SIGNIFICANT CHANGE UP
LEUKOCYTE ESTERASE UR-ACNC: NEGATIVE — SIGNIFICANT CHANGE UP
LYMPHOCYTES # BLD AUTO: 16.6 % — SIGNIFICANT CHANGE UP (ref 13–44)
LYMPHOCYTES # BLD AUTO: 16.6 % — SIGNIFICANT CHANGE UP (ref 13–44)
LYMPHOCYTES # BLD AUTO: 2.1 K/UL — SIGNIFICANT CHANGE UP (ref 1–3.3)
LYMPHOCYTES # BLD AUTO: 2.1 K/UL — SIGNIFICANT CHANGE UP (ref 1–3.3)
MAGNESIUM SERPL-MCNC: 2.1 MG/DL — SIGNIFICANT CHANGE UP (ref 1.6–2.6)
MAGNESIUM SERPL-MCNC: 2.1 MG/DL — SIGNIFICANT CHANGE UP (ref 1.6–2.6)
MCHC RBC-ENTMCNC: 33 PG — SIGNIFICANT CHANGE UP (ref 27–34)
MCHC RBC-ENTMCNC: 33 PG — SIGNIFICANT CHANGE UP (ref 27–34)
MCHC RBC-ENTMCNC: 33.9 GM/DL — SIGNIFICANT CHANGE UP (ref 32–36)
MCHC RBC-ENTMCNC: 33.9 GM/DL — SIGNIFICANT CHANGE UP (ref 32–36)
MCV RBC AUTO: 97.4 FL — SIGNIFICANT CHANGE UP (ref 80–100)
MCV RBC AUTO: 97.4 FL — SIGNIFICANT CHANGE UP (ref 80–100)
MONOCYTES # BLD AUTO: 1.41 K/UL — HIGH (ref 0–0.9)
MONOCYTES # BLD AUTO: 1.41 K/UL — HIGH (ref 0–0.9)
MONOCYTES NFR BLD AUTO: 11.2 % — SIGNIFICANT CHANGE UP (ref 2–14)
MONOCYTES NFR BLD AUTO: 11.2 % — SIGNIFICANT CHANGE UP (ref 2–14)
NEUTROPHILS # BLD AUTO: 8.83 K/UL — HIGH (ref 1.8–7.4)
NEUTROPHILS # BLD AUTO: 8.83 K/UL — HIGH (ref 1.8–7.4)
NEUTROPHILS NFR BLD AUTO: 69.7 % — SIGNIFICANT CHANGE UP (ref 43–77)
NEUTROPHILS NFR BLD AUTO: 69.7 % — SIGNIFICANT CHANGE UP (ref 43–77)
NITRITE UR-MCNC: NEGATIVE — SIGNIFICANT CHANGE UP
NITRITE UR-MCNC: NEGATIVE — SIGNIFICANT CHANGE UP
NRBC # BLD: 0 /100 WBCS — SIGNIFICANT CHANGE UP (ref 0–0)
NRBC # BLD: 0 /100 WBCS — SIGNIFICANT CHANGE UP (ref 0–0)
PH UR: 5 — SIGNIFICANT CHANGE UP (ref 5–8)
PH UR: 5 — SIGNIFICANT CHANGE UP (ref 5–8)
PLATELET # BLD AUTO: 173 K/UL — SIGNIFICANT CHANGE UP (ref 150–400)
PLATELET # BLD AUTO: 173 K/UL — SIGNIFICANT CHANGE UP (ref 150–400)
POTASSIUM SERPL-MCNC: 3.9 MMOL/L — SIGNIFICANT CHANGE UP (ref 3.5–5.3)
POTASSIUM SERPL-MCNC: 3.9 MMOL/L — SIGNIFICANT CHANGE UP (ref 3.5–5.3)
POTASSIUM SERPL-SCNC: 3.9 MMOL/L — SIGNIFICANT CHANGE UP (ref 3.5–5.3)
POTASSIUM SERPL-SCNC: 3.9 MMOL/L — SIGNIFICANT CHANGE UP (ref 3.5–5.3)
PROT SERPL-MCNC: 6.8 G/DL — SIGNIFICANT CHANGE UP (ref 6–8.3)
PROT SERPL-MCNC: 6.8 G/DL — SIGNIFICANT CHANGE UP (ref 6–8.3)
PROT UR-MCNC: NEGATIVE MG/DL — SIGNIFICANT CHANGE UP
PROT UR-MCNC: NEGATIVE MG/DL — SIGNIFICANT CHANGE UP
PROTHROM AB SERPL-ACNC: 47.9 SEC — HIGH (ref 9.5–13)
PROTHROM AB SERPL-ACNC: 47.9 SEC — HIGH (ref 9.5–13)
RBC # BLD: 5.33 M/UL — SIGNIFICANT CHANGE UP (ref 4.2–5.8)
RBC # BLD: 5.33 M/UL — SIGNIFICANT CHANGE UP (ref 4.2–5.8)
RBC # FLD: 15.9 % — HIGH (ref 10.3–14.5)
RBC # FLD: 15.9 % — HIGH (ref 10.3–14.5)
RBC CASTS # UR COMP ASSIST: 25 /HPF — HIGH (ref 0–4)
RBC CASTS # UR COMP ASSIST: 25 /HPF — HIGH (ref 0–4)
SODIUM SERPL-SCNC: 138 MMOL/L — SIGNIFICANT CHANGE UP (ref 135–145)
SODIUM SERPL-SCNC: 138 MMOL/L — SIGNIFICANT CHANGE UP (ref 135–145)
SP GR SPEC: 1.01 — SIGNIFICANT CHANGE UP (ref 1–1.03)
SP GR SPEC: 1.01 — SIGNIFICANT CHANGE UP (ref 1–1.03)
UROBILINOGEN FLD QL: 0.2 MG/DL — SIGNIFICANT CHANGE UP (ref 0.2–1)
UROBILINOGEN FLD QL: 0.2 MG/DL — SIGNIFICANT CHANGE UP (ref 0.2–1)
WBC # BLD: 12.64 K/UL — HIGH (ref 3.8–10.5)
WBC # BLD: 12.64 K/UL — HIGH (ref 3.8–10.5)
WBC # FLD AUTO: 12.64 K/UL — HIGH (ref 3.8–10.5)
WBC # FLD AUTO: 12.64 K/UL — HIGH (ref 3.8–10.5)
WBC UR QL: 0 /HPF — SIGNIFICANT CHANGE UP (ref 0–5)
WBC UR QL: 0 /HPF — SIGNIFICANT CHANGE UP (ref 0–5)

## 2023-12-09 PROCEDURE — 85610 PROTHROMBIN TIME: CPT

## 2023-12-09 PROCEDURE — 86850 RBC ANTIBODY SCREEN: CPT

## 2023-12-09 PROCEDURE — 85025 COMPLETE CBC W/AUTO DIFF WBC: CPT

## 2023-12-09 PROCEDURE — 87086 URINE CULTURE/COLONY COUNT: CPT

## 2023-12-09 PROCEDURE — 83735 ASSAY OF MAGNESIUM: CPT

## 2023-12-09 PROCEDURE — 87186 SC STD MICRODIL/AGAR DIL: CPT

## 2023-12-09 PROCEDURE — 86901 BLOOD TYPING SEROLOGIC RH(D): CPT

## 2023-12-09 PROCEDURE — 80053 COMPREHEN METABOLIC PANEL: CPT

## 2023-12-09 PROCEDURE — 86900 BLOOD TYPING SEROLOGIC ABO: CPT

## 2023-12-09 PROCEDURE — 36415 COLL VENOUS BLD VENIPUNCTURE: CPT

## 2023-12-09 PROCEDURE — 99284 EMERGENCY DEPT VISIT MOD MDM: CPT | Mod: 25

## 2023-12-09 PROCEDURE — 85730 THROMBOPLASTIN TIME PARTIAL: CPT

## 2023-12-09 PROCEDURE — 81001 URINALYSIS AUTO W/SCOPE: CPT

## 2023-12-09 NOTE — CONSULT NOTE ADULT - SUBJECTIVE AND OBJECTIVE BOX
History of Present Illness:  Mr. Trimble is a 78 year old man with PMHx HTN, CHF, DM, c/b neurogenic bladder (with chronic castellano) presenting with hematuria and urinary retention. Patient reports placement of castellano by Dr. Roth about 6 months ago because his bladder "stopped working." He has had intermittent hematuria and needed replacement of the castellano for obstruction numerous times, most recently 11/30/23 at this hospital. Patient saw Dr. Roth earlier this week and had catheter replaced, and was scheduled for bladder function/hematuria workup next week. Today patient noted darker blood in his urine and decreased drainage from the catheter and presented to the ED for onset of suprapubic pain. Denies fevers, chills, flank pain.    In the ED, patient afebrile and hemodynamically normal. Labs notable for hemoconcentration, supratherapuetic INR, and normal Creatinine. Patient had catheter replaced with return of about 1500cc of urine, initially clear with onset of bright red blood about an hour after catheter placement. Patient reports immediate relief of pain after catheter placement. Denies dizziness, lightheadedness, nausea, vomiting.    PAST MEDICAL HISTORY: Bezoar    DM Type 2 (Diabetes Mellitus, Type 2)    CAD (Coronary Artery Disease)    Hyperlipidemia    Obstructive Sleep Apnea    Obesity    Soft tissue disorder    Sleep apnea, obstructive    Gastroparesis        PAST SURGICAL HISTORY: Stented Coronary Artery    CABG (Coronary Artery Bypass Graft)    Status Post Implantation of Automatic Cardioverter/Defibrillator (AICD)    History of Cataract Extraction    S/P trigger finger release        HOME MEDICATIONS:    ALLERGIES: No Known Allergies      FAMILY HISTORY:     SOCIAL HISTORY:    REVIEW OF SYSTEMS:    VITAL SIGNS:  ICU Vital Signs Last 24 Hrs  T(C): 36.8 (08 Dec 2023 22:44), Max: 36.8 (08 Dec 2023 22:44)  T(F): 98.2 (08 Dec 2023 22:44), Max: 98.2 (08 Dec 2023 22:44)  HR: 68 (08 Dec 2023 22:44) (68 - 68)  BP: 151/78 (08 Dec 2023 22:44) (151/78 - 151/78)  BP(mean): --  ABP: --  ABP(mean): --  RR: 20 (08 Dec 2023 22:44) (20 - 20)  SpO2: 98% (08 Dec 2023 22:44) (98% - 98%)    O2 Parameters below as of 08 Dec 2023 22:44  Patient On (Oxygen Delivery Method): room air            PHYSICAL EXAMINATION:  General - well-nourished, no acute distress  Neuro - awake, alert, oriented x4, no acute focal deficits  HEENT - normocephalic, PERRL, moist mucous membranes  Lungs - breathing comfortably on room air  Heart - pulse regular  Abdomen - soft, nontender, nondistended  Extremities - all four extremities are warm   Castellano catheter in place draining brown urine without clots; dried blood at urethral meatus, no active oozing or bleeding    LABS:                          17.6   12.64 )-----------( 173      ( 09 Dec 2023 01:11 )             51.9       12-09    138  |  108  |  25<H>  ----------------------------<  120<H>  3.9   |  24  |  1.11    Ca    9.3      09 Dec 2023 01:11  Mg     2.1     12-09    TPro  6.8  /  Alb  3.2<L>  /  TBili  0.6  /  DBili  x   /  AST  54<H>  /  ALT  83<H>  /  AlkPhos  80  12-09      PT/INR - ( 09 Dec 2023 01:11 )   PT: 47.9 sec;   INR: 4.39 ratio         PTT - ( 09 Dec 2023 01:11 )  PTT:64.4 sec            RECENT CULTURES:      CAPILLARY BLOOD GLUCOSE          IMAGING STUDIES:

## 2023-12-09 NOTE — CONSULT NOTE ADULT - ASSESSMENT
Mr. Trimble is a 78 year old man with PMHx HTN, CHF, DM, c/b neurogenic bladder (with chronic castellano) presenting with hematuria and urinary retention.    Recommendations:  - no need for CBI as castellano replaced and now draining urine without clots  - recommend observation for additional hour to ensure continuous bladder decompression without clot formation  - patient advised to follow with Dr. Roth at his upcoming scheduled appointment 12/16 and counseled on signs to seek medical evaluation for issues with castellano  - patient should follow up with PCP to adjust coumadin dose as needed    to be discussed with urologist on call Mr. Trimble is a 78 year old man with PMHx HTN, CHF, DM, c/b neurogenic bladder (with chronic castellano) presenting with hematuria and urinary retention.    Recommendations:  - no need for CBI as castellano replaced and now draining urine without clots  - recommend observation for additional hour to ensure continuous bladder decompression without clot formation  - patient advised to follow with Dr. Roth at his upcoming scheduled appointment 12/16 and counseled on signs to seek medical evaluation for issues with castellano  - patient should follow up with PCP to adjust coumadin dose as needed    discussed with Dr. Wiley

## 2023-12-09 NOTE — ED PROVIDER NOTE - CLINICAL SUMMARY MEDICAL DECISION MAKING FREE TEXT BOX
78-year-old male hx of HTN, CHF, DM, c/b neurogenic bladder (with chronic castellano), presenting with hematuria and urinary retention. Labs with INR 4.39 - advised to discuss with his cardiologist asap. Hgb wnl. Castellano exchanged, urology saw and cleared patient, will discharge - has Urology followup later this week.

## 2023-12-09 NOTE — ED PROVIDER NOTE - PATIENT PORTAL LINK FT
You can access the FollowMyHealth Patient Portal offered by [facility] by registering at the following website: [email or web address]. By joining [employer]’s FollowMyHealth portal, you will also be able to view your health information using other applications (apps) compatible with our system. You can access the FollowMyHealth Patient Portal offered by [facility] by registering at the following website: [email or web address]. By joining [employer]’s FollowMyHealth portal, you will also be able to view your health information using other applications (apps) compatible with our system.

## 2023-12-09 NOTE — ED ADULT NURSE NOTE - OBJECTIVE STATEMENT
pt present alert and oriented x 3 able to make all needs known, ambulatory with castellano leg bag bright red blood present and has not drained since 1635  castellano exchanged per order, drainage yellow at this time 1500ml output to bag on initial insertion

## 2023-12-09 NOTE — ED ADULT NURSE NOTE - NSFALLRISKINTERV_ED_ALL_ED
Assistance OOB with selected safe patient handling equipment if applicable/Communicate fall risk and risk factors to all staff, patient, and family/Provide visual cue: yellow wristband, yellow gown, etc/Reinforce activity limits and safety measures with patient and family/Call bell, personal items and telephone in reach/Instruct patient to call for assistance before getting out of bed/chair/stretcher/Non-slip footwear applied when patient is off stretcher/[place] to call system/Physically safe environment - no spills, clutter or unnecessary equipment/Purposeful Proactive Rounding/Room/bathroom lighting operational, light cord in reach Assistance OOB with selected safe patient handling equipment if applicable/Communicate fall risk and risk factors to all staff, patient, and family/Provide visual cue: yellow wristband, yellow gown, etc/Reinforce activity limits and safety measures with patient and family/Call bell, personal items and telephone in reach/Instruct patient to call for assistance before getting out of bed/chair/stretcher/Non-slip footwear applied when patient is off stretcher/[place] to call system/Physically safe environment - no spills, clutter or unnecessary equipment/Purposeful Proactive Rounding/Room/bathroom lighting operational, light cord in reach

## 2023-12-09 NOTE — ED PROVIDER NOTE - NSFOLLOWUPINSTRUCTIONS_ED_ALL_ED_FT
You were seen in the emergency department for: urinary retention  Your results report is attached - please discuss with your cardiologist regarding the elevated INR (4.39) as soon as possible.  We recommend you follow up with: Urology as you have scheduled, Cardiology    Please return to the Emergency Department if you experience any of the following symptoms:   - Shortness of breath or trouble breathing  - Pressure, pain or tightness in the chest  - Face drooping, arm weakness or speech difficulty  - Persistence of severe vomiting  - Head injury or loss of consciousness  - Nonstop bleeding or an open wound    (1) Follow up with your primary care physician within the next 24-48 hours as discussed. In addition, we did not find evidence of a life threatening illness on your testing here today, but listed below are the specialists that will be necessary to see as an outpatient to continue the workup.  Please call the numbers listed below or [phone] to set up the necessary appointments.  (2) Take Tylenol (up to 1000mg or 1 g)  and/or Motrin (up to 600mg) up to every 6 hours as needed for pain.   (3) If you had an IV (intravenous) line placed, it was removed. Sometimes, after IV removal, that area can be tender for a few days; if it develops redness and swelling, those could be signs of infection; in which case, return to the Emergency Department for assessment.  (4) Please continue taking all of your home medications as directed. You were seen in the emergency department for: urinary retention  Your results report is attached - please discuss with your cardiologist regarding the elevated INR (4.39) as soon as possible.  We recommend you follow up with: Urology as you have scheduled, Cardiology    Please return to the Emergency Department if you experience any of the following symptoms:   - Shortness of breath or trouble breathing  - Pressure, pain or tightness in the chest  - Face drooping, arm weakness or speech difficulty  - Persistence of severe vomiting  - Head injury or loss of consciousness  - Nonstop bleeding or an open wound    (1) Follow up with your primary care physician within the next 24-48 hours as discussed. In addition, we did not find evidence of a life threatening illness on your testing here today, but listed below are the specialists that will be necessary to see as an outpatient to continue the workup.  Please call the numbers listed below or [phone] to set up the necessary appointments.  (2) Take Tylenol (up to 1000mg or 1 g)  and/or Motrin (up to 600mg) up to every 6 hours as needed for pain.   (3) If you had an IV (intravenous) line placed, it was removed. Sometimes, after IV removal, that area can be tender for a few days; if it develops redness and swelling, those could be signs of infection; in which case, return to the Emergency Department for assessment.  (4) Please continue taking all of your home medications as directed.

## 2023-12-09 NOTE — ED PROVIDER NOTE - OBJECTIVE STATEMENT
78-year-old male hx of HTN, CHF, DM, c/b neurogenic bladder (with chronic castellano), presenting with hematuria and urinary retention. Has had this issue multiple times, most recently a few weeks ago. Denies any fevers, chills, or any other symptoms.

## 2023-12-12 LAB
-  AMOXICILLIN/CLAVULANIC ACID: SIGNIFICANT CHANGE UP
-  AMOXICILLIN/CLAVULANIC ACID: SIGNIFICANT CHANGE UP
-  AMPICILLIN/SULBACTAM: SIGNIFICANT CHANGE UP
-  AMPICILLIN/SULBACTAM: SIGNIFICANT CHANGE UP
-  AMPICILLIN: SIGNIFICANT CHANGE UP
-  AMPICILLIN: SIGNIFICANT CHANGE UP
-  CEFAZOLIN: SIGNIFICANT CHANGE UP
-  CEFAZOLIN: SIGNIFICANT CHANGE UP
-  CEFEPIME: SIGNIFICANT CHANGE UP
-  CEFEPIME: SIGNIFICANT CHANGE UP
-  CEFOXITIN: SIGNIFICANT CHANGE UP
-  CEFOXITIN: SIGNIFICANT CHANGE UP
-  CEFTRIAXONE: SIGNIFICANT CHANGE UP
-  CEFTRIAXONE: SIGNIFICANT CHANGE UP
-  CEFUROXIME: SIGNIFICANT CHANGE UP
-  CEFUROXIME: SIGNIFICANT CHANGE UP
-  CIPROFLOXACIN: SIGNIFICANT CHANGE UP
-  CIPROFLOXACIN: SIGNIFICANT CHANGE UP
-  ERTAPENEM: SIGNIFICANT CHANGE UP
-  ERTAPENEM: SIGNIFICANT CHANGE UP
-  GENTAMICIN: SIGNIFICANT CHANGE UP
-  GENTAMICIN: SIGNIFICANT CHANGE UP
-  IMIPENEM: SIGNIFICANT CHANGE UP
-  IMIPENEM: SIGNIFICANT CHANGE UP
-  LEVOFLOXACIN: SIGNIFICANT CHANGE UP
-  LEVOFLOXACIN: SIGNIFICANT CHANGE UP
-  MEROPENEM: SIGNIFICANT CHANGE UP
-  MEROPENEM: SIGNIFICANT CHANGE UP
-  NITROFURANTOIN: SIGNIFICANT CHANGE UP
-  NITROFURANTOIN: SIGNIFICANT CHANGE UP
-  PIPERACILLIN/TAZOBACTAM: SIGNIFICANT CHANGE UP
-  PIPERACILLIN/TAZOBACTAM: SIGNIFICANT CHANGE UP
-  TOBRAMYCIN: SIGNIFICANT CHANGE UP
-  TOBRAMYCIN: SIGNIFICANT CHANGE UP
-  TRIMETHOPRIM/SULFAMETHOXAZOLE: SIGNIFICANT CHANGE UP
-  TRIMETHOPRIM/SULFAMETHOXAZOLE: SIGNIFICANT CHANGE UP
CULTURE RESULTS: ABNORMAL
CULTURE RESULTS: ABNORMAL
METHOD TYPE: SIGNIFICANT CHANGE UP
METHOD TYPE: SIGNIFICANT CHANGE UP
ORGANISM # SPEC MICROSCOPIC CNT: ABNORMAL
SPECIMEN SOURCE: SIGNIFICANT CHANGE UP
SPECIMEN SOURCE: SIGNIFICANT CHANGE UP

## 2024-03-18 ENCOUNTER — INPATIENT (INPATIENT)
Facility: HOSPITAL | Age: 79
LOS: 7 days | Discharge: NURSING, REHAB OR LONG-TERM CARE | DRG: 948 | End: 2024-03-26
Attending: STUDENT IN AN ORGANIZED HEALTH CARE EDUCATION/TRAINING PROGRAM | Admitting: STUDENT IN AN ORGANIZED HEALTH CARE EDUCATION/TRAINING PROGRAM
Payer: MEDICARE

## 2024-03-18 VITALS
TEMPERATURE: 98 F | WEIGHT: 186.95 LBS | SYSTOLIC BLOOD PRESSURE: 93 MMHG | HEART RATE: 52 BPM | DIASTOLIC BLOOD PRESSURE: 59 MMHG | RESPIRATION RATE: 17 BRPM | HEIGHT: 70 IN | OXYGEN SATURATION: 94 %

## 2024-03-18 DIAGNOSIS — R53.1 WEAKNESS: ICD-10-CM

## 2024-03-18 DIAGNOSIS — Z98.89 OTHER SPECIFIED POSTPROCEDURAL STATES: Chronic | ICD-10-CM

## 2024-03-18 LAB
ALBUMIN SERPL ELPH-MCNC: 3.2 G/DL — LOW (ref 3.5–5)
ALP SERPL-CCNC: 94 U/L — SIGNIFICANT CHANGE UP (ref 40–120)
ALT FLD-CCNC: 893 U/L DA — HIGH (ref 10–60)
ANION GAP SERPL CALC-SCNC: 6 MMOL/L — SIGNIFICANT CHANGE UP (ref 5–17)
APPEARANCE UR: ABNORMAL
APTT BLD: 38.4 SEC — HIGH (ref 24.5–35.6)
AST SERPL-CCNC: 708 U/L — HIGH (ref 10–40)
BASOPHILS # BLD AUTO: 0.02 K/UL — SIGNIFICANT CHANGE UP (ref 0–0.2)
BASOPHILS NFR BLD AUTO: 0.4 % — SIGNIFICANT CHANGE UP (ref 0–2)
BILIRUB SERPL-MCNC: 0.9 MG/DL — SIGNIFICANT CHANGE UP (ref 0.2–1.2)
BILIRUB UR-MCNC: NEGATIVE — SIGNIFICANT CHANGE UP
BUN SERPL-MCNC: 36 MG/DL — HIGH (ref 7–18)
CALCIUM SERPL-MCNC: 9.2 MG/DL — SIGNIFICANT CHANGE UP (ref 8.4–10.5)
CHLORIDE SERPL-SCNC: 104 MMOL/L — SIGNIFICANT CHANGE UP (ref 96–108)
CO2 SERPL-SCNC: 24 MMOL/L — SIGNIFICANT CHANGE UP (ref 22–31)
COLOR SPEC: YELLOW — SIGNIFICANT CHANGE UP
CREAT SERPL-MCNC: 1.73 MG/DL — HIGH (ref 0.5–1.3)
DIFF PNL FLD: ABNORMAL
EGFR: 40 ML/MIN/1.73M2 — LOW
EOSINOPHIL # BLD AUTO: 0.01 K/UL — SIGNIFICANT CHANGE UP (ref 0–0.5)
EOSINOPHIL NFR BLD AUTO: 0.2 % — SIGNIFICANT CHANGE UP (ref 0–6)
ETHANOL SERPL-MCNC: <3 MG/DL — SIGNIFICANT CHANGE UP (ref 0–10)
FLUAV AG NPH QL: SIGNIFICANT CHANGE UP
FLUBV AG NPH QL: SIGNIFICANT CHANGE UP
GLUCOSE SERPL-MCNC: 159 MG/DL — HIGH (ref 70–99)
GLUCOSE UR QL: >=1000 MG/DL
HCT VFR BLD CALC: 50.8 % — HIGH (ref 39–50)
HGB BLD-MCNC: 17.3 G/DL — HIGH (ref 13–17)
IMM GRANULOCYTES NFR BLD AUTO: 0.8 % — SIGNIFICANT CHANGE UP (ref 0–0.9)
INR BLD: 2.16 RATIO — HIGH (ref 0.85–1.18)
KETONES UR-MCNC: NEGATIVE MG/DL — SIGNIFICANT CHANGE UP
LACTATE SERPL-SCNC: 1.8 MMOL/L — SIGNIFICANT CHANGE UP (ref 0.7–2)
LEUKOCYTE ESTERASE UR-ACNC: ABNORMAL
LIDOCAIN IGE QN: 81 U/L — HIGH (ref 13–75)
LYMPHOCYTES # BLD AUTO: 1.31 K/UL — SIGNIFICANT CHANGE UP (ref 1–3.3)
LYMPHOCYTES # BLD AUTO: 24.8 % — SIGNIFICANT CHANGE UP (ref 13–44)
MCHC RBC-ENTMCNC: 32.8 PG — SIGNIFICANT CHANGE UP (ref 27–34)
MCHC RBC-ENTMCNC: 34.1 GM/DL — SIGNIFICANT CHANGE UP (ref 32–36)
MCV RBC AUTO: 96.4 FL — SIGNIFICANT CHANGE UP (ref 80–100)
MONOCYTES # BLD AUTO: 0.9 K/UL — SIGNIFICANT CHANGE UP (ref 0–0.9)
MONOCYTES NFR BLD AUTO: 17 % — HIGH (ref 2–14)
NEUTROPHILS # BLD AUTO: 3 K/UL — SIGNIFICANT CHANGE UP (ref 1.8–7.4)
NEUTROPHILS NFR BLD AUTO: 56.8 % — SIGNIFICANT CHANGE UP (ref 43–77)
NITRITE UR-MCNC: NEGATIVE — SIGNIFICANT CHANGE UP
NRBC # BLD: 0 /100 WBCS — SIGNIFICANT CHANGE UP (ref 0–0)
NT-PROBNP SERPL-SCNC: 320 PG/ML — SIGNIFICANT CHANGE UP (ref 0–450)
PH UR: 5.5 — SIGNIFICANT CHANGE UP (ref 5–8)
PLATELET # BLD AUTO: 131 K/UL — LOW (ref 150–400)
POTASSIUM SERPL-MCNC: 4.6 MMOL/L — SIGNIFICANT CHANGE UP (ref 3.5–5.3)
POTASSIUM SERPL-SCNC: 4.6 MMOL/L — SIGNIFICANT CHANGE UP (ref 3.5–5.3)
PROT SERPL-MCNC: 6.8 G/DL — SIGNIFICANT CHANGE UP (ref 6–8.3)
PROT UR-MCNC: ABNORMAL MG/DL
PROTHROM AB SERPL-ACNC: 24.1 SEC — HIGH (ref 9.5–13)
RBC # BLD: 5.27 M/UL — SIGNIFICANT CHANGE UP (ref 4.2–5.8)
RBC # FLD: 15.1 % — HIGH (ref 10.3–14.5)
SARS-COV-2 RNA SPEC QL NAA+PROBE: DETECTED
SODIUM SERPL-SCNC: 134 MMOL/L — LOW (ref 135–145)
SP GR SPEC: 1.02 — SIGNIFICANT CHANGE UP (ref 1–1.03)
TROPONIN I, HIGH SENSITIVITY RESULT: 14.8 NG/L — SIGNIFICANT CHANGE UP
UROBILINOGEN FLD QL: 1 MG/DL — SIGNIFICANT CHANGE UP (ref 0.2–1)
WBC # BLD: 5.28 K/UL — SIGNIFICANT CHANGE UP (ref 3.8–10.5)
WBC # FLD AUTO: 5.28 K/UL — SIGNIFICANT CHANGE UP (ref 3.8–10.5)

## 2024-03-18 PROCEDURE — 99223 1ST HOSP IP/OBS HIGH 75: CPT | Mod: GC

## 2024-03-18 PROCEDURE — 74177 CT ABD & PELVIS W/CONTRAST: CPT | Mod: 26,MC

## 2024-03-18 PROCEDURE — 70450 CT HEAD/BRAIN W/O DYE: CPT | Mod: 26,MC

## 2024-03-18 PROCEDURE — 99285 EMERGENCY DEPT VISIT HI MDM: CPT

## 2024-03-18 PROCEDURE — 71260 CT THORAX DX C+: CPT | Mod: 26,MC

## 2024-03-18 PROCEDURE — 72125 CT NECK SPINE W/O DYE: CPT | Mod: 26,MC

## 2024-03-18 RX ORDER — CEFTRIAXONE 500 MG/1
1000 INJECTION, POWDER, FOR SOLUTION INTRAMUSCULAR; INTRAVENOUS ONCE
Refills: 0 | Status: COMPLETED | OUTPATIENT
Start: 2024-03-18 | End: 2024-03-18

## 2024-03-18 RX ORDER — SODIUM CHLORIDE 9 MG/ML
1000 INJECTION INTRAMUSCULAR; INTRAVENOUS; SUBCUTANEOUS ONCE
Refills: 0 | Status: COMPLETED | OUTPATIENT
Start: 2024-03-18 | End: 2024-03-18

## 2024-03-18 RX ADMIN — CEFTRIAXONE 100 MILLIGRAM(S): 500 INJECTION, POWDER, FOR SOLUTION INTRAMUSCULAR; INTRAVENOUS at 21:42

## 2024-03-18 RX ADMIN — SODIUM CHLORIDE 1000 MILLILITER(S): 9 INJECTION INTRAMUSCULAR; INTRAVENOUS; SUBCUTANEOUS at 19:18

## 2024-03-18 RX ADMIN — SODIUM CHLORIDE 1000 MILLILITER(S): 9 INJECTION INTRAMUSCULAR; INTRAVENOUS; SUBCUTANEOUS at 21:37

## 2024-03-18 NOTE — H&P ADULT - PROBLEM SELECTOR PLAN 8
cont coreg, diuretics, appears to be on 30 U lantus bid + sliding scale regular insulin???  appears to be on ozempic now?--unclear why given PMH indicats hx of gastroparesis  -HEATHER for now, titrate up, as needed, basal lantus 30 u in a.m., nutritional 3 U + HEATHER  -f/u A1c

## 2024-03-18 NOTE — H&P ADULT - ATTENDING COMMENTS
IMAGING  CT C/A/P with IV Contrast  IMPRESSION:  1. Multifocal groundglass opacities which are nonspecific but may be related to known COVID infection.  2. Hepatic cirrhosis.  3. Markedly abnormal thick walled bladder with Castellano catheter in place. Cystoscopy recommended.    CT Head/C-Spine  BRAIN  IMPRESSION:  1) involutional change and volume loss, commensurate with age. No acute abnormality suggested.  2) no intracerebral hemorrhage, contusion, or extracerebral hemorrhagic collections identified  CERVICAL IMPRESSION:  Chronic degenerative changes C3-C7 with central and foraminal narrowing at these levels. No acute fracture identified.    EKG  Wide QRS Rhythm, 53 bpm, QTc 489ms, QRS 152ms, on my read no ST segment elevation or depression, TWI in V1    HPI  78 year old male patient with pmhx CAD status post 5 stents, CHF on Lasix, A-fib, PPM/AICD, on Plavix and warfarin, Vertigo, DM, Fatty Liver, Neurogenic bladder with chronic castellano, recent "spot on bladder" of unknown etiology for which he is scheduled for biopsy who presented to the ER due to generalized weakness with a mechanical fall and worsening confusion.  Yesterday he got out of the shower and got his vertigo and fell and hit the back of his head. No loss of consciousness. In the ER patient found to have COVID and a UTI.    Review Of Systems included: + chronic runny nose, + cough, + loss of appetite, + chronic constipation from time to time,   no loss of consciousness, no numbness, no incontinence of urine or stool, no shaking, no tongue biting, no nausea, no vomiting, no chest pain, no leg swelling, no dysuria, no diarrhea, no headache, no fever, no chills    Physical Exam  General: Awake, Alert, Oriented x3, mildly confused  Cardiac: RRR  Pulmonary: CTA b/l  Abdominal: Soft, ND, NT  Neuro: CN II-XII intact, Palo-Hallpike positive for increased dizziness.    A/P  # UTI  Chronic Castellano changed in ER and U/A obtained from new castellano  - IV Ceftriaxone  - Follow up urine culture    # COVID  Good O2 saturation on room air  - Supportive Care    # Fall 2/2 Generalized Weakness  # Vertigo  - Meclizine 25mg TID  - PT evaluation  - Fall precautions    # HANK  Creatinine of 1.73; was. 1.11 on 12/9/23  s/p 2 liters IV NS    # Elevated LFTs  - Follow up morning CMP  - RUQ Abdominal U/S    # Hx of DM  - Insulin Sliding Scale  - Blood Glucose Monitoring  - Continue home long acting insulin at 75% of dose, titrate as needed    # Hx of AFib, CHF, CAD  - Continue home medications    # DVT PPx  - Continue home medication Warfarin  - Monitor PT/INR    # FEN  - Can continue home medication Lasix  - Monitor and replete electrolytes as needed  - Diabetic DASH Diet    Previous Admissions Included  12/9/2023: ER Visit for hematuria and urinary retention  11/30/2023: ER Visit for urinary retention  8/12/2016: Excision of left knee mass  7/27/2011: Exploration and resection of soft tissue mass of left distal thigh    Patient case and management was discussed with ER Attending  I did examine all labs, imaging, prior notes

## 2024-03-18 NOTE — H&P ADULT - ASSESSMENT
79 yo M from home, ambulates with cane, with CAD x5 stents on warfarin and plavix, CHF (unk EF), PPM/AICD, chronic vertigo, DM2, chronic castellano, recurrent UTI, "bladder spot" requiring further o/p workup, presenting s/p fall backwards, admitted with UTI iso chronic castellano, mechanical fall, and covid.  79 yo M from home, ambulates with cane, with CAD x5 stents on warfarin and plavix, CHF (unk EF), PPM/AICD, chronic vertigo, DM2, chronic castellano, recurrent UTI, "bladder spot" requiring further o/p workup, presenting s/p fall backwards, admitted with UTI iso chronic catsellano, mechanical fall, and covid.    PRIMARY TEAM KINDLY CONFIRM MEDS IN A.M.   Patient does not remember, and we are unable to reach pharmacy. Current list is entered from [facility].

## 2024-03-18 NOTE — H&P ADULT - NSICDXPASTSURGICALHX_GEN_ALL_CORE_FT
PAST SURGICAL HISTORY:  CABG (Coronary Artery Bypass Graft)     History of Cataract Extraction b/l    S/P trigger finger release left    Status Post Implantation of Automatic Cardioverter/Defibrillator (AICD)     Stented Coronary Artery Drug eluting stent ? 2008 or 2012

## 2024-03-18 NOTE — H&P ADULT - NSHPPHYSICALEXAM_GEN_ALL_CORE
T(C): 37.4 (03-19-24 @ 00:31), Max: 37.4 (03-19-24 @ 00:31)  HR: 57 (03-19-24 @ 00:31) (52 - 57)  BP: 112/54 (03-19-24 @ 00:31) (93/59 - 121/96)  RR: 17 (03-19-24 @ 00:31) (17 - 17)  SpO2: 96% (03-19-24 @ 00:31) (94% - 96%)    CONSTITUTIONAL: Well groomed, no acute distress    HEENT: normotramuatic, acephalic, PERRL and symmetric, EOMI, No conjunctival or scleral injection, non-icteric. Oral mucosa with moist membranes. No external nasal lesions.               Neck: supple, symmetric and without tracheal deviation    RESPIRATORY: No respiratory distress, no use of accessory muscles; CTA b/l, no wheezes, rales or rhonchi    CARDIOVASCULAR: RRRR, +S1S2, no murmurs, no rubs, no gallops; no JVD; no peripheral edema  	Vascular: radial pulse palpable    GASTROINTESTINAL: +BS, Soft, non tender, non distended, no rebound, no guarding; No palpable masses    MUSCULOSKELETAL: No digital clubbing or cyanosis; no spinal tenderness    SKIN: +bruising on both arms    NEUROLOGIC: sensation intact in upper and lower extremities b/l to light touch; strength diminished in upper extremities and legs. No nystagmus. Modified una hallpike pos.    PSYCHIATRIC: Fair insight/judgment; A+O x 3, mood and affect appropriate, recent/remote memory intact    LYMPHATIC: No cervical LAD or tenderness    GENITOURINARY: No suprapubic tenderness, + R CVA tenderness

## 2024-03-18 NOTE — ED PROVIDER NOTE - SECONDARY DIAGNOSIS.
Fall, initial encounter HANK (acute kidney injury) 2019 novel coronavirus disease (COVID-19) Acute UTI

## 2024-03-18 NOTE — H&P ADULT - PROBLEM SELECTOR PLAN 1
U/a pos  CVA tenderness on R  Pt asymptomatic   WBC WNL  As per surescripts he has had multiple abx rx within the last few months -- suspect for UTI    -given recent abx use, and recent cephalosporin use, appropriate to broaden coverage to zosyn  -f/u cx  -ID  ________  -frequent UTI is also a contraindication for farxiga, unclear why patient is still on it U/a pos  CVA tenderness on R  Pt asymptomatic   WBC WNL  As per surescripts he has had multiple abx rx within the last few months -- suspect for UTI    -CTX  -f/u cx  -frequent UTI is also a contraindication for farxiga, unclear why patient is still on it

## 2024-03-18 NOTE — ED ADULT NURSE NOTE - NSFALLRISKINTERV_ED_ALL_ED
Assistance OOB with selected safe patient handling equipment if applicable/Assistance with ambulation/Communicate fall risk and risk factors to all staff, patient, and family/Encourage patient to sit up slowly, dangle for a short time, stand at bedside before walking/Monitor gait and stability/Monitor for mental status changes and reorient to person, place, and time, as needed/Orthostatic vital signs/Provide visual cue: yellow wristband, yellow gown, etc/Reinforce activity limits and safety measures with patient and family/Toileting schedule using arm’s reach rule for commode and bathroom/Use of alarms - bed, stretcher, chair and/or video monitoring/Call bell, personal items and telephone in reach/Instruct patient to call for assistance before getting out of bed/chair/stretcher/Non-slip footwear applied when patient is off stretcher/[place] to call system/Physically safe environment - no spills, clutter or unnecessary equipment/Purposeful Proactive Rounding/Room/bathroom lighting operational, light cord in reach

## 2024-03-18 NOTE — H&P ADULT - PROBLEM SELECTOR PLAN 3
reportedly has long history of vertigo  not on meclizine    -meclizine 25 mg q8h standing for now  -PT angélica

## 2024-03-18 NOTE — H&P ADULT - PROBLEM SELECTOR PLAN 7
previously on insulin, appears to be on ozempic now?  unclear why given PMH indicats hx of gastroparesis  -HEATHER for now, titrate up, likely will need basal coverage, was prev on high doses of lantus, 40 units  -f/u A1c appears to be on 40 u lantus and rgular insulin???  appears to be on ozempic now?--unclear why given PMH indicats hx of gastroparesis  -HEATHER for now, titrate up, as needed, basal lantus 20 u in a.m.  -f/u A1c appears to be on 30 U lantus bid + sliding scale regular insulin???  appears to be on ozempic now?--unclear why given PMH indicats hx of gastroparesis  -HEATHER for now, titrate up, as needed, basal lantus 30 u in a.m., nutritional 3 U + HEATHER  -f/u A1c cont amiodarone, warfarin

## 2024-03-18 NOTE — ED PROVIDER NOTE - CARE PLAN
1 Principal Discharge DX:	Generalized weakness  Secondary Diagnosis:	2019 novel coronavirus disease (COVID-19)  Secondary Diagnosis:	Acute UTI  Secondary Diagnosis:	HANK (acute kidney injury)  Secondary Diagnosis:	Fall, initial encounter

## 2024-03-18 NOTE — ED ADULT TRIAGE NOTE - CHIEF COMPLAINT QUOTE
s/p fall last night hitting, dizziness history vertigo, with progressively worsen confused , disoriented , cough not new symptoms, takes coumadin no LOC, wife states its unsafe for patient to be home

## 2024-03-18 NOTE — H&P ADULT - HISTORY OF PRESENT ILLNESS
Pt is a 77 yo M from home, ambulates with cane, with CAD x5 stents on warfarin and plavix, cardiac arrest in 2014, CHF (EF 30%), PPM/AICD, paroxysmal AF, CKD3, chronic vertigo, DM2, chronic castellano, recurrent UTI, hypothyroid, hx mitral valve clipping, "bladder spot" requiring further o/p workup, presenting s/p fall backwards. Pt is a limited narrator. He says that he has a long history of vertigo, and that he got out of the shower, was at the sink, and got dizzy, lost his balance, when he fell backwards and hit back of his head against shower door. He denies any loc, convulsions, tongue biting, loss of bowel/bladder control. He called his wife for help. He says that he has had a long history of vertigo, says nothing has helped him, but that Epley maneuver is mildly effective. He says he bumps into things a lot because of the vertigo, and that he has bruises on his arms from this. He says that he has had longstanding rhinorrhea. He does also endorse that he tested pos for Covid on 3/8.  Denies nausea, vomiting, diarrhea, abdominal pain, SOB, chest pain, SAVAGE, palpitations, headache, cough, wheezing, joint pain or swelling, fever, chills.

## 2024-03-18 NOTE — ED PROVIDER NOTE - CLINICAL SUMMARY MEDICAL DECISION MAKING FREE TEXT BOX
Patient with history of vertigo and symptoms consistent with that of his prior, though more severe with more weakness. Character low suspicion for CVA and no focal or localizing findings to warrant CT angio imaging or neurology consult or stroke admission. No significant arrhythmia on his baseline. No significant anemia or bleeding to warrant blood transfusion at this time, or gross electrolyte abnormalities to warrant repletion or admission. No CP/SOB to suggest ACS or e/o DVT to suggest PE to warrant admission or further work-up in this regard. No evidence of fluid overload. No fever or specific infectious symptoms, and UA (from Starr bag) __________. No e/o trauma on full body exam, and none other than to head by history, and CTs __________. Patient with history of vertigo and symptoms consistent with that of his prior, though more severe with more weakness. Character low suspicion for CVA and no focal or localizing findings to warrant CT angio imaging or neurology consult or stroke admission. No significant arrhythmia on his baseline. No significant anemia or bleeding to warrant blood transfusion at this time, or gross electrolyte abnormalities to warrant repletion or admission. No CP/SOB to suggest ACS or e/o DVT to suggest PE to warrant admission or further work-up in this regard. No e/o PNA on CT or exam. No evidence of fluid overload. No fever or specific infectious symptoms, though UA (from new Starr bag) +UTI and will treat. No e/o trauma on full body exam, and none other than to head by history, and CTs unremarkable. Patient nontoxic appearing, BP still on low side in the 90s though mentating well and nontoxic appearing, starting a 2nd L IVF right now. Admitted to internal medicine for further monitoring, w/u, and care.

## 2024-03-18 NOTE — H&P ADULT - PROBLEM SELECTOR PLAN 2
As per o/p notes in HIE from Dr. Vieira, pt has rapid deterioration in overall neuro function with increased weakness, and apparently he has had decreasing motor skills and cannot dress himself anymore.  fall with headstrike on 3/17  [facility] neg for acute pathology    -he was recommended neuro eval  -neuro eval in a.m.  -PT consult

## 2024-03-18 NOTE — H&P ADULT - NSICDXPASTMEDICALHX_GEN_ALL_CORE_FT
PAST MEDICAL HISTORY:  [place]     CAD (Coronary Artery Disease)     DM Type 2 (Diabetes Mellitus, Type 2)     Gastroparesis     Hyperlipidemia     Obesity     Obstructive Sleep Apnea     Sleep apnea, obstructive on CPAP    Soft tissue disorder

## 2024-03-18 NOTE — ED PROVIDER NOTE - PHYSICAL EXAMINATION
Afebrile, hypotensive, saturating well on room air, 97% on monitor  NAD, shaky, nontoxic appearing, sitting comfortably in bed, no WOB/tachypnea, speaking full sentences  Head NCAT, no CTL spine TTP/upper/deformity  EOMI grossly, anicteric  MM dry  No JVD  RRR, nml S1/S2, no m/r/g  Lungs CTAB, no w/r/r  Abd soft, NT, ND, nml BS, no rebound or guarding  AAO, CN's 3-12 intact, motor 5/5 and sensation symmetric in all extremities  BARAJAS spontaneously, no leg cyanosis or edema, no extremity TTP/step-off/deformity  Skin warm, dry, no rashes or hives, no bruising noted

## 2024-03-18 NOTE — ED ADULT NURSE NOTE - OBJECTIVE STATEMENT
Pt came to ed s/p fall last night hitting, dizziness history vertigo, with progressively worsen confused , disoriented , cough not new symptoms, takes coumadin no LOC, wife states its unsafe for patient to be home

## 2024-03-18 NOTE — ED PROVIDER NOTE - OBJECTIVE STATEMENT
78-year-old male with history of CAD status post 5 stents, CHF on Lasix, A-fib, PPM/AICD, on Plavix and warfarin, recent "spot on bladder" of unknown etiology for which he is scheduled for biopsy, presents for weakness and fall. Presents with wife and niece at bedside. Patient states that yesterday he got his vertigo and fell, hitting the back of his head. Wife states she witnessed him coming out of the shower, feeling shaky, holding onto the sink but ultimately unable to hold himself and falling backward and hitting the back of his head on the shower door. Since then he has had increased shakiness, confusion. They note he had COVID diagnosed approximately 3/8, and has had a worsening cough since then. Also significantly decreased p.o. intake. They deny all other symptoms including shortness of breath, chest pain, vomiting, diarrhea, black or bloody stool.

## 2024-03-18 NOTE — H&P ADULT - PROBLEM SELECTOR PLAN 5
cont statin, plavix EF 30% as per o/p documentation  not in exacerbatio n    -cont home lasix, spironolactone, coreg

## 2024-03-19 DIAGNOSIS — R74.01 ELEVATION OF LEVELS OF LIVER TRANSAMINASE LEVELS: ICD-10-CM

## 2024-03-19 DIAGNOSIS — I25.10 ATHEROSCLEROTIC HEART DISEASE OF NATIVE CORONARY ARTERY WITHOUT ANGINA PECTORIS: ICD-10-CM

## 2024-03-19 DIAGNOSIS — I50.9 HEART FAILURE, UNSPECIFIED: ICD-10-CM

## 2024-03-19 DIAGNOSIS — U07.1 COVID-19: ICD-10-CM

## 2024-03-19 DIAGNOSIS — I10 ESSENTIAL (PRIMARY) HYPERTENSION: ICD-10-CM

## 2024-03-19 DIAGNOSIS — I48.20 CHRONIC ATRIAL FIBRILLATION, UNSPECIFIED: ICD-10-CM

## 2024-03-19 DIAGNOSIS — N39.0 URINARY TRACT INFECTION, SITE NOT SPECIFIED: ICD-10-CM

## 2024-03-19 DIAGNOSIS — E11.9 TYPE 2 DIABETES MELLITUS WITHOUT COMPLICATIONS: ICD-10-CM
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
## 2024-03-19 DIAGNOSIS — Z29.9 ENCOUNTER FOR PROPHYLACTIC MEASURES, UNSPECIFIED: ICD-10-CM

## 2024-03-19 DIAGNOSIS — R42 DIZZINESS AND GIDDINESS: ICD-10-CM

## 2024-03-19 LAB
A1C WITH ESTIMATED AVERAGE GLUCOSE RESULT: 6.9 % — HIGH (ref 4–5.6)
ALBUMIN SERPL ELPH-MCNC: 2.5 G/DL — LOW (ref 3.5–5)
ALP SERPL-CCNC: 78 U/L — SIGNIFICANT CHANGE UP (ref 40–120)
ALT FLD-CCNC: 732 U/L DA — HIGH (ref 10–60)
ANION GAP SERPL CALC-SCNC: 4 MMOL/L — LOW (ref 5–17)
APAP SERPL-MCNC: 3 UG/ML — LOW (ref 10–30)
AST SERPL-CCNC: 525 U/L — HIGH (ref 10–40)
BASOPHILS # BLD AUTO: 0.01 K/UL — SIGNIFICANT CHANGE UP (ref 0–0.2)
BASOPHILS NFR BLD AUTO: 0.2 % — SIGNIFICANT CHANGE UP (ref 0–2)
BILIRUB SERPL-MCNC: 0.7 MG/DL — SIGNIFICANT CHANGE UP (ref 0.2–1.2)
BUN SERPL-MCNC: 25 MG/DL — HIGH (ref 7–18)
CALCIUM SERPL-MCNC: 8.3 MG/DL — LOW (ref 8.4–10.5)
CHLORIDE SERPL-SCNC: 108 MMOL/L — SIGNIFICANT CHANGE UP (ref 96–108)
CO2 SERPL-SCNC: 24 MMOL/L — SIGNIFICANT CHANGE UP (ref 22–31)
CREAT SERPL-MCNC: 1.11 MG/DL — SIGNIFICANT CHANGE UP (ref 0.5–1.3)
EGFR: 68 ML/MIN/1.73M2 — SIGNIFICANT CHANGE UP
EOSINOPHIL # BLD AUTO: 0.02 K/UL — SIGNIFICANT CHANGE UP (ref 0–0.5)
EOSINOPHIL NFR BLD AUTO: 0.4 % — SIGNIFICANT CHANGE UP (ref 0–6)
ESTIMATED AVERAGE GLUCOSE: 151 MG/DL — HIGH (ref 68–114)
GLUCOSE BLDC GLUCOMTR-MCNC: 107 MG/DL — HIGH (ref 70–99)
GLUCOSE BLDC GLUCOMTR-MCNC: 145 MG/DL — HIGH (ref 70–99)
GLUCOSE BLDC GLUCOMTR-MCNC: 77 MG/DL — SIGNIFICANT CHANGE UP (ref 70–99)
GLUCOSE BLDC GLUCOMTR-MCNC: 86 MG/DL — SIGNIFICANT CHANGE UP (ref 70–99)
GLUCOSE SERPL-MCNC: 89 MG/DL — SIGNIFICANT CHANGE UP (ref 70–99)
HCT VFR BLD CALC: 46.6 % — SIGNIFICANT CHANGE UP (ref 39–50)
HCV AB S/CO SERPL IA: 0.07 S/CO — SIGNIFICANT CHANGE UP (ref 0–0.99)
HCV AB SERPL-IMP: SIGNIFICANT CHANGE UP
HGB BLD-MCNC: 15.9 G/DL — SIGNIFICANT CHANGE UP (ref 13–17)
IMM GRANULOCYTES NFR BLD AUTO: 1 % — HIGH (ref 0–0.9)
INR BLD: 2.34 RATIO — HIGH (ref 0.85–1.18)
LYMPHOCYTES # BLD AUTO: 1.25 K/UL — SIGNIFICANT CHANGE UP (ref 1–3.3)
LYMPHOCYTES # BLD AUTO: 25.4 % — SIGNIFICANT CHANGE UP (ref 13–44)
MAGNESIUM SERPL-MCNC: 1.9 MG/DL — SIGNIFICANT CHANGE UP (ref 1.6–2.6)
MCHC RBC-ENTMCNC: 32.8 PG — SIGNIFICANT CHANGE UP (ref 27–34)
MCHC RBC-ENTMCNC: 34.1 GM/DL — SIGNIFICANT CHANGE UP (ref 32–36)
MCV RBC AUTO: 96.1 FL — SIGNIFICANT CHANGE UP (ref 80–100)
MONOCYTES # BLD AUTO: 0.6 K/UL — SIGNIFICANT CHANGE UP (ref 0–0.9)
MONOCYTES NFR BLD AUTO: 12.2 % — SIGNIFICANT CHANGE UP (ref 2–14)
NEUTROPHILS # BLD AUTO: 2.99 K/UL — SIGNIFICANT CHANGE UP (ref 1.8–7.4)
NEUTROPHILS NFR BLD AUTO: 60.8 % — SIGNIFICANT CHANGE UP (ref 43–77)
NRBC # BLD: 0 /100 WBCS — SIGNIFICANT CHANGE UP (ref 0–0)
PHOSPHATE SERPL-MCNC: 2.5 MG/DL — SIGNIFICANT CHANGE UP (ref 2.5–4.5)
PLATELET # BLD AUTO: 112 K/UL — LOW (ref 150–400)
POTASSIUM SERPL-MCNC: 4 MMOL/L — SIGNIFICANT CHANGE UP (ref 3.5–5.3)
POTASSIUM SERPL-SCNC: 4 MMOL/L — SIGNIFICANT CHANGE UP (ref 3.5–5.3)
PROT SERPL-MCNC: 5.6 G/DL — LOW (ref 6–8.3)
PROTHROM AB SERPL-ACNC: 26 SEC — HIGH (ref 9.5–13)
RBC # BLD: 4.85 M/UL — SIGNIFICANT CHANGE UP (ref 4.2–5.8)
RBC # FLD: 15.2 % — HIGH (ref 10.3–14.5)
SODIUM SERPL-SCNC: 136 MMOL/L — SIGNIFICANT CHANGE UP (ref 135–145)
WBC # BLD: 4.92 K/UL — SIGNIFICANT CHANGE UP (ref 3.8–10.5)
WBC # FLD AUTO: 4.92 K/UL — SIGNIFICANT CHANGE UP (ref 3.8–10.5)

## 2024-03-19 PROCEDURE — 99222 1ST HOSP IP/OBS MODERATE 55: CPT

## 2024-03-19 RX ORDER — FUROSEMIDE 40 MG
40 TABLET ORAL DAILY
Refills: 0 | Status: DISCONTINUED | OUTPATIENT
Start: 2024-03-19 | End: 2024-03-26

## 2024-03-19 RX ORDER — PIPERACILLIN AND TAZOBACTAM 4; .5 G/20ML; G/20ML
3.38 INJECTION, POWDER, LYOPHILIZED, FOR SOLUTION INTRAVENOUS ONCE
Refills: 0 | Status: DISCONTINUED | OUTPATIENT
Start: 2024-03-19 | End: 2024-03-19

## 2024-03-19 RX ORDER — BRIMONIDINE TARTRATE 2 MG/MG
1 SOLUTION/ DROPS OPHTHALMIC AT BEDTIME
Refills: 0 | Status: DISCONTINUED | OUTPATIENT
Start: 2024-03-19 | End: 2024-03-26

## 2024-03-19 RX ORDER — INSULIN LISPRO 100/ML
3 VIAL (ML) SUBCUTANEOUS
Refills: 0 | Status: DISCONTINUED | OUTPATIENT
Start: 2024-03-19 | End: 2024-03-21

## 2024-03-19 RX ORDER — ATORVASTATIN CALCIUM 80 MG/1
20 TABLET, FILM COATED ORAL AT BEDTIME
Refills: 0 | Status: DISCONTINUED | OUTPATIENT
Start: 2024-03-19 | End: 2024-03-20

## 2024-03-19 RX ORDER — WARFARIN SODIUM 2.5 MG/1
1 TABLET ORAL
Refills: 0 | DISCHARGE

## 2024-03-19 RX ORDER — BRIMONIDINE TARTRATE 2 MG/MG
1 SOLUTION/ DROPS OPHTHALMIC
Refills: 0 | DISCHARGE

## 2024-03-19 RX ORDER — LATANOPROST 0.05 MG/ML
1 SOLUTION/ DROPS OPHTHALMIC; TOPICAL AT BEDTIME
Refills: 0 | Status: DISCONTINUED | OUTPATIENT
Start: 2024-03-19 | End: 2024-03-26

## 2024-03-19 RX ORDER — PIPERACILLIN AND TAZOBACTAM 4; .5 G/20ML; G/20ML
3.38 INJECTION, POWDER, LYOPHILIZED, FOR SOLUTION INTRAVENOUS EVERY 8 HOURS
Refills: 0 | Status: DISCONTINUED | OUTPATIENT
Start: 2024-03-19 | End: 2024-03-19

## 2024-03-19 RX ORDER — SPIRONOLACTONE 25 MG/1
25 TABLET, FILM COATED ORAL DAILY
Refills: 0 | Status: DISCONTINUED | OUTPATIENT
Start: 2024-03-19 | End: 2024-03-26

## 2024-03-19 RX ORDER — WARFARIN SODIUM 2.5 MG/1
2.5 TABLET ORAL ONCE
Refills: 0 | Status: COMPLETED | OUTPATIENT
Start: 2024-03-19 | End: 2024-03-19

## 2024-03-19 RX ORDER — INSULIN LISPRO 100/ML
VIAL (ML) SUBCUTANEOUS
Refills: 0 | Status: DISCONTINUED | OUTPATIENT
Start: 2024-03-19 | End: 2024-03-26

## 2024-03-19 RX ORDER — ACETAMINOPHEN 500 MG
650 TABLET ORAL EVERY 6 HOURS
Refills: 0 | Status: DISCONTINUED | OUTPATIENT
Start: 2024-03-19 | End: 2024-03-26

## 2024-03-19 RX ORDER — MEMANTINE HYDROCHLORIDE 10 MG/1
1 TABLET ORAL
Refills: 0 | DISCHARGE

## 2024-03-19 RX ORDER — ONDANSETRON 8 MG/1
4 TABLET, FILM COATED ORAL EVERY 8 HOURS
Refills: 0 | Status: DISCONTINUED | OUTPATIENT
Start: 2024-03-19 | End: 2024-03-26

## 2024-03-19 RX ORDER — CHOLECALCIFEROL (VITAMIN D3) 125 MCG
1 CAPSULE ORAL
Refills: 0 | DISCHARGE

## 2024-03-19 RX ORDER — FUROSEMIDE 40 MG
3 TABLET ORAL
Refills: 0 | DISCHARGE

## 2024-03-19 RX ORDER — SEMAGLUTIDE 0.68 MG/ML
0.25 INJECTION, SOLUTION SUBCUTANEOUS
Refills: 0 | DISCHARGE

## 2024-03-19 RX ORDER — DAPAGLIFLOZIN 10 MG/1
1 TABLET, FILM COATED ORAL
Refills: 0 | DISCHARGE

## 2024-03-19 RX ORDER — CLOPIDOGREL BISULFATE 75 MG/1
75 TABLET, FILM COATED ORAL AT BEDTIME
Refills: 0 | Status: DISCONTINUED | OUTPATIENT
Start: 2024-03-19 | End: 2024-03-26

## 2024-03-19 RX ORDER — SACUBITRIL AND VALSARTAN 24; 26 MG/1; MG/1
1 TABLET, FILM COATED ORAL
Refills: 0 | DISCHARGE

## 2024-03-19 RX ORDER — LEVOTHYROXINE SODIUM 125 MCG
100 TABLET ORAL DAILY
Refills: 0 | Status: DISCONTINUED | OUTPATIENT
Start: 2024-03-19 | End: 2024-03-26

## 2024-03-19 RX ORDER — LANOLIN ALCOHOL/MO/W.PET/CERES
3 CREAM (GRAM) TOPICAL AT BEDTIME
Refills: 0 | Status: DISCONTINUED | OUTPATIENT
Start: 2024-03-19 | End: 2024-03-26

## 2024-03-19 RX ORDER — INSULIN GLARGINE 100 [IU]/ML
30 INJECTION, SOLUTION SUBCUTANEOUS EVERY MORNING
Refills: 0 | Status: DISCONTINUED | OUTPATIENT
Start: 2024-03-19 | End: 2024-03-21

## 2024-03-19 RX ORDER — FENOFIBRATE,MICRONIZED 130 MG
1 CAPSULE ORAL
Refills: 0 | DISCHARGE

## 2024-03-19 RX ORDER — CARVEDILOL PHOSPHATE 80 MG/1
3.12 CAPSULE, EXTENDED RELEASE ORAL EVERY 12 HOURS
Refills: 0 | Status: DISCONTINUED | OUTPATIENT
Start: 2024-03-19 | End: 2024-03-22

## 2024-03-19 RX ORDER — AMIODARONE HYDROCHLORIDE 400 MG/1
400 TABLET ORAL DAILY
Refills: 0 | Status: DISCONTINUED | OUTPATIENT
Start: 2024-03-19 | End: 2024-03-21

## 2024-03-19 RX ORDER — CEFTRIAXONE 500 MG/1
1000 INJECTION, POWDER, FOR SOLUTION INTRAMUSCULAR; INTRAVENOUS EVERY 24 HOURS
Refills: 0 | Status: DISCONTINUED | OUTPATIENT
Start: 2024-03-19 | End: 2024-03-20

## 2024-03-19 RX ORDER — LATANOPROST 0.05 MG/ML
1 SOLUTION/ DROPS OPHTHALMIC; TOPICAL
Refills: 0 | DISCHARGE

## 2024-03-19 RX ORDER — INSULIN LISPRO 100/ML
VIAL (ML) SUBCUTANEOUS AT BEDTIME
Refills: 0 | Status: DISCONTINUED | OUTPATIENT
Start: 2024-03-19 | End: 2024-03-26

## 2024-03-19 RX ORDER — INSULIN GLARGINE 100 [IU]/ML
20 INJECTION, SOLUTION SUBCUTANEOUS EVERY MORNING
Refills: 0 | Status: DISCONTINUED | OUTPATIENT
Start: 2024-03-19 | End: 2024-03-19

## 2024-03-19 RX ORDER — LEVOTHYROXINE SODIUM 125 MCG
1 TABLET ORAL
Refills: 0 | DISCHARGE

## 2024-03-19 RX ORDER — MECLIZINE HCL 12.5 MG
25 TABLET ORAL THREE TIMES A DAY
Refills: 0 | Status: DISCONTINUED | OUTPATIENT
Start: 2024-03-19 | End: 2024-03-26

## 2024-03-19 RX ADMIN — Medication 3 UNIT(S): at 17:00

## 2024-03-19 RX ADMIN — Medication 25 MILLIGRAM(S): at 05:32

## 2024-03-19 RX ADMIN — AMIODARONE HYDROCHLORIDE 400 MILLIGRAM(S): 400 TABLET ORAL at 05:31

## 2024-03-19 RX ADMIN — Medication 3 UNIT(S): at 12:15

## 2024-03-19 RX ADMIN — SPIRONOLACTONE 25 MILLIGRAM(S): 25 TABLET, FILM COATED ORAL at 05:32

## 2024-03-19 RX ADMIN — Medication 100 MICROGRAM(S): at 05:32

## 2024-03-19 RX ADMIN — Medication 40 MILLIGRAM(S): at 05:32

## 2024-03-19 RX ADMIN — Medication 3 UNIT(S): at 08:34

## 2024-03-19 RX ADMIN — CARVEDILOL PHOSPHATE 3.12 MILLIGRAM(S): 80 CAPSULE, EXTENDED RELEASE ORAL at 05:31

## 2024-03-19 RX ADMIN — INSULIN GLARGINE 30 UNIT(S): 100 INJECTION, SOLUTION SUBCUTANEOUS at 08:35

## 2024-03-19 RX ADMIN — Medication 25 MILLIGRAM(S): at 14:27

## 2024-03-19 NOTE — PROGRESS NOTE ADULT - ASSESSMENT
79 yo M from home, ambulates with cane, with CAD x5 stents on warfarin and plavix, CHF (unk EF), PPM/AICD, chronic vertigo, DM2, chronic castellano, recurrent UTI, "bladder spot" requiring further o/p workup, presenting s/p fall backwards, admitted with UTI iso chronic castellano, mechanical fall, and covid.

## 2024-03-19 NOTE — PROGRESS NOTE ADULT - PROBLEM SELECTOR PLAN 8
appears to be on 30 U lantus bid + sliding scale regular insulin???  appears to be on ozempic now?--unclear why given PMH indicats hx of gastroparesis  -HEATHER for now, titrate up, as needed, basal lantus 30 u in a.m., nutritional 3 U + HEATHER  -f/u A1c

## 2024-03-19 NOTE — PROGRESS NOTE ADULT - PROBLEM SELECTOR PLAN 1
As per o/p notes in HIE from Dr. Vieira, pt has rapid deterioration in overall neuro function with increased weakness, and apparently he has had decreasing motor skills and cannot dress himself anymore.  fall with headstrike on 3/17  [facility] neg for acute pathology  neuro Dr. Milligan consulted for vertigo and ?dementia  PT consult

## 2024-03-19 NOTE — PROGRESS NOTE ADULT - SUBJECTIVE AND OBJECTIVE BOX
NP Note discussed with  Primary Attending    Patient is a 78y old  Male who presents with a chief complaint of UTI, fall (18 Mar 2024 23:08).  Seen at bedside, appears comfortable.  Noted with memory impairment, states he has been told he is starting "dementia".      INTERVAL HPI/OVERNIGHT EVENTS: no new complaints    MEDICATIONS  (STANDING):  aMIOdarone    Tablet 400 milliGRAM(s) Oral daily  atorvastatin 20 milliGRAM(s) Oral at bedtime  brimonidine 0.2% Ophthalmic Solution 1 Drop(s) Both EYES at bedtime  carvedilol 3.125 milliGRAM(s) Oral every 12 hours  cefTRIAXone   IVPB 1000 milliGRAM(s) IV Intermittent every 24 hours  clopidogrel Tablet 75 milliGRAM(s) Oral at bedtime  furosemide    Tablet 40 milliGRAM(s) Oral daily  insulin glargine Injectable (LANTUS) 30 Unit(s) SubCutaneous every morning  insulin lispro (ADMELOG) corrective regimen sliding scale   SubCutaneous three times a day before meals  insulin lispro (ADMELOG) corrective regimen sliding scale   SubCutaneous at bedtime  insulin lispro Injectable (ADMELOG) 3 Unit(s) SubCutaneous three times a day before meals  latanoprost 0.005% Ophthalmic Solution 1 Drop(s) Both EYES at bedtime  levothyroxine 100 MICROGram(s) Oral daily  meclizine 25 milliGRAM(s) Oral three times a day  spironolactone 25 milliGRAM(s) Oral daily  warfarin 2.5 milliGRAM(s) Oral once    MEDICATIONS  (PRN):  acetaminophen     Tablet .. 650 milliGRAM(s) Oral every 6 hours PRN Temp greater or equal to 38C (100.4F), Mild Pain (1 - 3)  melatonin 3 milliGRAM(s) Oral at bedtime PRN Insomnia  ondansetron Injectable 4 milliGRAM(s) IV Push every 8 hours PRN Nausea and/or Vomiting      __________________________________________________  REVIEW OF SYSTEMS:    CONSTITUTIONAL: No fever,   EYES: no acute visual disturbances  NECK: No pain or stiffness  RESPIRATORY: No cough; No shortness of breath  CARDIOVASCULAR: No chest pain, no palpitations  GASTROINTESTINAL: No pain. No nausea or vomiting; No diarrhea   NEUROLOGICAL: No headache or numbness, no tremors  MUSCULOSKELETAL: No joint pain, no muscle pain  GENITOURINARY: no dysuria, no frequency, no hesitancy  PSYCHIATRY: no depression , no anxiety  ALL OTHER  ROS negative        Vital Signs Last 24 Hrs  T(C): 37.1 (19 Mar 2024 07:52), Max: 37.4 (19 Mar 2024 00:31)  T(F): 98.7 (19 Mar 2024 07:52), Max: 99.3 (19 Mar 2024 00:31)  HR: 57 (19 Mar 2024 07:52) (52 - 57)  BP: 115/88 (19 Mar 2024 07:52) (93/59 - 121/96)  BP(mean): --  RR: 17 (19 Mar 2024 07:52) (17 - 17)  SpO2: 95% (19 Mar 2024 07:52) (94% - 96%)    Parameters below as of 19 Mar 2024 07:52  Patient On (Oxygen Delivery Method): room air        ________________________________________________  PHYSICAL EXAM:  Well developed, comfortable  GENERAL: NAD  HEENT: Normocephalic;  conjunctivae and sclerae clear; moist mucous membranes;   NECK : supple  CHEST/LUNG: Clear to auscultation bilaterally with good air entry   HEART: S1 S2  regular; no murmurs, gallops or rubs  ABDOMEN: Soft, Nontender, Nondistended; Bowel sounds present  EXTREMITIES: no cyanosis; no edema; no calf tenderness  SKIN: warm and dry; no rash  NERVOUS SYSTEM:  Awake and alert; Oriented  to place, person and time ; no new deficits    _________________________________________________  LABS:                        15.9   4.92  )-----------( 112      ( 19 Mar 2024 04:55 )             46.6     03-19    136  |  108  |  25<H>  ----------------------------<  89  4.0   |  24  |  1.11    Ca    8.3<L>      19 Mar 2024 04:55  Phos  2.5     03-19  Mg     1.9     03-19    TPro  5.6<L>  /  Alb  2.5<L>  /  TBili  0.7  /  DBili  x   /  AST  525<H>  /  ALT  732<H>  /  AlkPhos  78  03-19    PT/INR - ( 19 Mar 2024 04:55 )   PT: 26.0 sec;   INR: 2.34 ratio         PTT - ( 18 Mar 2024 19:00 )  PTT:38.4 sec  Urinalysis Basic - ( 19 Mar 2024 04:55 )    Color: x / Appearance: x / SG: x / pH: x  Gluc: 89 mg/dL / Ketone: x  / Bili: x / Urobili: x   Blood: x / Protein: x / Nitrite: x   Leuk Esterase: x / RBC: x / WBC x   Sq Epi: x / Non Sq Epi: x / Bacteria: x      CAPILLARY BLOOD GLUCOSE      POCT Blood Glucose.: 107 mg/dL (19 Mar 2024 11:21)  POCT Blood Glucose.: 77 mg/dL (19 Mar 2024 07:39)        RADIOLOGY & ADDITIONAL TESTS:    < from: CT Abdomen and Pelvis w/ IV Cont (03.18.24 @ 20:33) >    ACC: [id number] EXAM:  CT CHEST IC   ORDERED BY: JOSÉ LUIS RENE     ACC: [id number] EXAM:  CT ABDOMEN AND PELVIS IC   ORDERED BY: JOSÉ LUIS RENE     PROCEDURE DATE:  03/18/2024          INTERPRETATION:  CLINICAL INFORMATION: Trauma    COMPARISON:None.    CONTRAST/COMPLICATIONS:  IV Contrast: Omnipaque 350  90 cc administered   10 cc discarded  Oral Contrast: NONE  Complications: None reported at time of study completion    PROCEDURE:  CT of the Chest, Abdomen and Pelvis was performed.  Sagittal and coronal reformats were performed.    FINDINGS:  CHEST:  LUNGS AND LARGE AIRWAYS: Patent central airways. Multifocal groundglass   opacities are nonspecific but may represent areas of   infection/inflammation.  PLEURA: No pleural effusion.  VESSELS: Atherosclerotic changes of the aorta and coronary arteries.  HEART: Heart size is normal. Cardiac pacemaker in place. No pericardial   effusion.  MEDIASTINUM AND SHAWNA: No lymphadenopathy.  CHEST WALL AND LOWER NECK: Within normal limits.    ABDOMEN AND PELVIS:  LIVER: Cirrhotic morphology.  BILE DUCTS: Normal caliber.  GALLBLADDER: Within normal limits.  SPLEEN: Within normal limits.  PANCREAS: Within normal limits.  ADRENALS: Within normal limits.  KIDNEYS/URETERS: Within normal limits.    BLADDER: Markedly thick-walled bladder with Starr catheter in place.   Irregular bladder wall enhancement.  REPRODUCTIVE ORGANS: Prostatic calcifications.    BOWEL: No bowel obstruction. Appendix is normal.  PERITONEUM: No ascites.  VESSELS: Atherosclerotic changes.. Extensive small vessel vascular   calcifications likely related to diabetes.  RETROPERITONEUM/LYMPH NODES: No lymphadenopathy.  ABDOMINAL WALL: There is infiltration of the subcutaneous fat along the   superior abdominal wall extending to the level of the umbilicus. Within   the deep subcutaneous tissues, there is a linear area with calcifications.  BONES: Median sternotomy with sternal wires. No evidence of displaced   fractures. L4-5 grade 1 spondylolisthesis.    IMPRESSION:  1. Multifocal groundglass opacities which are nonspecific but may be   related to known COVID infection.  2.  Hepatic cirrhosis.  3.  Markedly abnormal thick walled bladder with Starr catheter in place.   Cystoscopy recommended.    < end of copied text >    < from: CT Head No Cont (03.18.24 @ 20:33) >    ACC: [id number] EXAM:  CT CERVICAL SPINE   ORDERED BY: JOSÉ LUIS RENE     ACC: [id number] EXAM:  CT BRAIN   ORDERED BY: JOSÉ LUIS RENE     PROCEDURE DATE:  03/18/2024          INTERPRETATION:  INDICATION: Headache with neck pain status post trauma.  TECHNIQUE:  A non contrast 2.5mm axial CT study of the brain was   performed from skull base to vertex. Coronal and sagittal reformations   were generated from the axial data.  COMPARISON EXAMINATION:  No prior    FINDINGS:    HEMISPHERES:  There are involutional changes with volume loss,   commensurate with age. No acute infarct, hemorrhage, or mass identified.  VENTRICLES:  Midline with mild ex vacuo enlargement  POSTERIOR FOSSA:  The brain stem and cerebellum are unremarkable.  No CP   angle lesion noted.  EXTRACEREBRAL SPACES:  No subdural or epidural collections are noted.  SKULL BASE AND CALVARIUM:  Appears intact.  No fracture or destructive   lesion is identified.  SINUSES AND MASTOIDS:  Inflammatory changes are noted in the sinuses.  Mastoid cells are clear.  MISCELLANEOUS: No orbital or pituitary abnormality noted.    CT CERVICAL    INDICATIONS:  neck pain. Trauma.  TECHNIQUE:  Thin section CT imaging was conducted.  3-D, Coronal and   sagittal reformations were generated fromthe axial data.    FINDINGS:    There is alteration of the cervical lordosis which may reflect   positioning or spasm.    C1/C2  :  The anterior and posterior arches of C1 appear to be intact.   There is no C1-C2 subluxation. No odontoid fracture is noted. Base of C2   appears to be intact    The mid and lower cervical vertebral bodies appear to be intact. No upper   thoracic fracture is noted.    Chronic degenerative changes are noted at C3-4 C4-5 and C5-6 and C6-7.   There are spondylotic and arthritic changes. Ridge disc complexes   diffusely narrowed canal. The foramina are narrowed at multiple levels.    Prevertebral soft tissues are unremarkable. Calcified plaque is noted in   the neck vasculature.      BRAIN  IMPRESSION:    1)  involutional change and volume loss, commensurate with age. No acute   abnormality suggested.  2)  no intracerebral hemorrhage, contusion, or extracerebral hemorrhagic   collections identified.    CERVICAL IMPRESSION:    Chronic degenerative changes C3-C7 with central and foraminal narrowing   at these levels. No acute fracture identified.    --- End of Report ---    < end of copied text >      < from: CT Chest w/ IV Cont (03.18.24 @ 20:32) >    ACC: [id number] EXAM:  CT CHEST IC   ORDERED BY: JOSÉ LUIS RENE     ACC: [id number] EXAM:  CT ABDOMEN AND PELVIS IC   ORDERED BY: JOSÉ LUIS RENE     PROCEDURE DATE:  03/18/2024          INTERPRETATION:  CLINICAL INFORMATION: Trauma    COMPARISON:None.    CONTRAST/COMPLICATIONS:  IV Contrast: Omnipaque 350  90 cc administered   10 cc discarded  Oral Contrast: NONE  Complications: None reported at time of study completion    PROCEDURE:  CT of the Chest, Abdomen and Pelvis was performed.  Sagittal and coronal reformats were performed.    FINDINGS:  CHEST:  LUNGS AND LARGE AIRWAYS: Patent central airways. Multifocal groundglass   opacities are nonspecific but may represent areas of   infection/inflammation.  PLEURA: No pleural effusion.  VESSELS: Atherosclerotic changes of the aorta and coronary arteries.  HEART: Heart size is normal. Cardiac pacemaker in place. No pericardial   effusion.  MEDIASTINUM AND SHAWNA: No lymphadenopathy.  CHEST WALL AND LOWER NECK: Within normal limits.    ABDOMEN AND PELVIS:  LIVER: Cirrhotic morphology.  BILE DUCTS: Normal caliber.  GALLBLADDER: Within normal limits.  SPLEEN: Within normal limits.  PANCREAS: Within normal limits.  ADRENALS: Within normal limits.  KIDNEYS/URETERS: Within normal limits.    BLADDER: Markedly thick-walled bladder with Starr catheter in place.   Irregular bladder wall enhancement.  REPRODUCTIVE ORGANS: Prostatic calcifications.    BOWEL: No bowel obstruction. Appendix is normal.  PERITONEUM: No ascites.  VESSELS: Atherosclerotic changes.. Extensive small vessel vascular   calcifications likely related to diabetes.  RETROPERITONEUM/LYMPH NODES: No lymphadenopathy.  ABDOMINAL WALL: There is infiltration of the subcutaneous fat along the   superior abdominal wall extending to the level of the umbilicus. Within   the deep subcutaneous tissues, there is a linear area with calcifications.  BONES: Median sternotomy with sternal wires. No evidence of displaced   fractures. L4-5 grade 1 spondylolisthesis.    IMPRESSION:  1. Multifocal groundglass opacities which are nonspecific but may be   related to known COVID infection.  2.  Hepatic cirrhosis.  3.  Markedly abnormal thick walled bladder with Starr catheter in place.   Cystoscopy recommended.    < end of copied text >          Imaging Personally Reviewed:  YES/NO    Consultant(s) Notes Reviewed:   YES/ No    Care Discussed with Consultants :     Plan of care was discussed with patient and /or primary care giver; all questions and concerns were addressed and care was aligned with patient's wishes.

## 2024-03-19 NOTE — CONSULT NOTE ADULT - SUBJECTIVE AND OBJECTIVE BOX
Chief Complaint:  Patient is a 78y old  Male who presents with a chief complaint of UTI, fall (19 Mar 2024 14:08)      HPI:  FARHAT ROMO is a 78y Male w/ Hx of Type 2 DM, HTN, HLD, hypothyroidism, obesity, CAD s/p stent x5 and CABG, c/p cardiac arrest (2014), CHF (LVEF 30%), s/p PPM/AICD, pAfib, mitral valve "clipping", CKD stage 3, recurrent UTIs, indwelling Starr, LUCIA, gastroparesis, recent COVID-19 (3/8/24), abnormal bladder imaging (pending biopsy) and vertigo presented to [facility] ER on 3/18/24 s/p fall, dizziness, w/o LOC, but hitting his head.  On admission he was found to have severe hepatocellular liver enzyme elevation for what hepatology was consulted.       PMHX/PSHX:    Bezoar  DM Type 2 (Diabetes Mellitus, Type 2)  CAD (Coronary Artery Disease)  Hyperlipidemia  Obstructive Sleep Apnea  Obesity  Soft tissue disorder  Sleep apnea, obstructive  Gastroparesis  Stented Coronary Artery  CABG (Coronary Artery Bypass Graft)  Status Post Implantation of Automatic Cardioverter/Defibrillator (AICD)  History of Cataract Extraction  S/P trigger finger release      Allergies:  No Known Allergies      Home Medications: reviewed  Hospital Medications:  acetaminophen     Tablet .. 650 milliGRAM(s) Oral every 6 hours PRN  aMIOdarone    Tablet 400 milliGRAM(s) Oral daily  atorvastatin 20 milliGRAM(s) Oral at bedtime  brimonidine 0.2% Ophthalmic Solution 1 Drop(s) Both EYES at bedtime  carvedilol 3.125 milliGRAM(s) Oral every 12 hours  cefTRIAXone   IVPB 1000 milliGRAM(s) IV Intermittent every 24 hours  clopidogrel Tablet 75 milliGRAM(s) Oral at bedtime  furosemide    Tablet 40 milliGRAM(s) Oral daily  insulin glargine Injectable (LANTUS) 30 Unit(s) SubCutaneous every morning  insulin lispro (ADMELOG) corrective regimen sliding scale   SubCutaneous three times a day before meals  insulin lispro (ADMELOG) corrective regimen sliding scale   SubCutaneous at bedtime  insulin lispro Injectable (ADMELOG) 3 Unit(s) SubCutaneous three times a day before meals  latanoprost 0.005% Ophthalmic Solution 1 Drop(s) Both EYES at bedtime  levothyroxine 100 MICROGram(s) Oral daily  meclizine 25 milliGRAM(s) Oral three times a day  melatonin 3 milliGRAM(s) Oral at bedtime PRN  ondansetron Injectable 4 milliGRAM(s) IV Push every 8 hours PRN  spironolactone 25 milliGRAM(s) Oral daily  warfarin 2.5 milliGRAM(s) Oral once      Social History:   Tob: Denies  EtOH: Denies  Illicit Drugs: Denies    Family history:    Denies family history of colon cancer/polyps, stomach cancer/polyps, pancreatic cancer/masses, liver cancer/disease, ovarian cancer and endometrial cancer.    ROS:   General:  No  fevers, chills, night sweats, fatigue  Eyes:  Good vision, no reported pain  ENT:  No sore throat, pain, runny nose  CV:  No pain, palpitations  Pulm:  No dyspnea, cough  GI:  See HPI, otherwise negative  :  No  incontinence, nocturia  Muscle:  No pain, weakness  Neuro:  No memory problems  Psych:  No insomnia, mood problems, depression  Endocrine:  No polyuria, polydipsia, cold/heat intolerance  Heme:  No petechiae, ecchymosis, easy bruisability  Skin:  No rash    PHYSICAL EXAM:   Vital Signs:  Vital Signs Last 24 Hrs  T(C): 37.1 (19 Mar 2024 07:52), Max: 37.4 (19 Mar 2024 00:31)  T(F): 98.7 (19 Mar 2024 07:52), Max: 99.3 (19 Mar 2024 00:31)  HR: 57 (19 Mar 2024 07:52) (52 - 57)  BP: 115/88 (19 Mar 2024 07:52) (93/59 - 121/96)  BP(mean): --  RR: 17 (19 Mar 2024 07:52) (17 - 17)  SpO2: 95% (19 Mar 2024 07:52) (94% - 96%)    Parameters below as of 19 Mar 2024 07:52  Patient On (Oxygen Delivery Method): room air      Daily Height in cm: 177.8 (18 Mar 2024 18:02)    Daily     GENERAL: no acute distress  NEURO: alert, no asterixis  HEENT: anicteric sclera, no conjunctival pallor appreciated  CHEST: no respiratory distress, no accessory muscle use  CARDIAC: regular rate, rhythm  ABDOMEN: soft, non-tender, non-distended, no rebound or guarding  EXTREMITIES: warm, well perfused, no edema  SKIN: no lesions noted    LABS: reviewed                        15.9   4.92  )-----------( 112      ( 19 Mar 2024 04:55 )             46.6     03-19    136  |  108  |  25<H>  ----------------------------<  89  4.0   |  24  |  1.11    Ca    8.3<L>      19 Mar 2024 04:55  Phos  2.5     03-19  Mg     1.9     03-19    TPro  5.6<L>  /  Alb  2.5<L>  /  TBili  0.7  /  DBili  x   /  AST  525<H>  /  ALT  732<H>  /  AlkPhos  78  03-19    LIVER FUNCTIONS - ( 19 Mar 2024 04:55 )  Alb: 2.5 g/dL / Pro: 5.6 g/dL / ALK PHOS: 78 U/L / ALT: 732 U/L DA / AST: 525 U/L / GGT: x               Diagnostic Studies: see sunrise for full report         Chief Complaint:  Patient is a 78y old  Male who presents with a chief complaint of UTI, fall (19 Mar 2024 14:08)      HPI:  FARHAT ROMO is a 78y Male w/ Hx of Type 2 DM, HTN, HLD, hypothyroidism, obesity, CAD s/p stent x5 and CABG, c/p cardiac arrest (2014), CHF (LVEF 30%), s/p PPM/AICD, pAfib, mitral valve "clipping", CKD stage 3, recurrent UTIs, indwelling Starr, LUCIA, gastroparesis, abnormal bladder imaging (pending biopsy) and vertigo presented to [facility] ER on 3/18/24 s/p fall, dizziness, w/o LOC, but hitting his head.  On admission he was found to have severe hepatocellular liver enzyme elevation for what hepatology was consulted. Noted to be COVID-19 pos.      PMHX/PSHX:    Bezoar  DM Type 2 (Diabetes Mellitus, Type 2)  CAD (Coronary Artery Disease)  Hyperlipidemia  Obstructive Sleep Apnea  Obesity  Soft tissue disorder  Sleep apnea, obstructive  Gastroparesis  Stented Coronary Artery  CABG (Coronary Artery Bypass Graft)  Status Post Implantation of Automatic Cardioverter/Defibrillator (AICD)  History of Cataract Extraction  S/P trigger finger release      Allergies:  No Known Allergies      Home Medications: reviewed  Hospital Medications:  acetaminophen     Tablet .. 650 milliGRAM(s) Oral every 6 hours PRN  aMIOdarone    Tablet 400 milliGRAM(s) Oral daily  atorvastatin 20 milliGRAM(s) Oral at bedtime  brimonidine 0.2% Ophthalmic Solution 1 Drop(s) Both EYES at bedtime  carvedilol 3.125 milliGRAM(s) Oral every 12 hours  cefTRIAXone   IVPB 1000 milliGRAM(s) IV Intermittent every 24 hours  clopidogrel Tablet 75 milliGRAM(s) Oral at bedtime  furosemide    Tablet 40 milliGRAM(s) Oral daily  insulin glargine Injectable (LANTUS) 30 Unit(s) SubCutaneous every morning  insulin lispro (ADMELOG) corrective regimen sliding scale   SubCutaneous three times a day before meals  insulin lispro (ADMELOG) corrective regimen sliding scale   SubCutaneous at bedtime  insulin lispro Injectable (ADMELOG) 3 Unit(s) SubCutaneous three times a day before meals  latanoprost 0.005% Ophthalmic Solution 1 Drop(s) Both EYES at bedtime  levothyroxine 100 MICROGram(s) Oral daily  meclizine 25 milliGRAM(s) Oral three times a day  melatonin 3 milliGRAM(s) Oral at bedtime PRN  ondansetron Injectable 4 milliGRAM(s) IV Push every 8 hours PRN  spironolactone 25 milliGRAM(s) Oral daily  warfarin 2.5 milliGRAM(s) Oral once      Social History:   Tob: Denies  EtOH: Denies  Illicit Drugs: Denies    Family history:    Denies family history of liver disease.    ROS:   General:  No  fevers, chills, night sweats, fatigue  Eyes:  Good vision, no reported pain  ENT:  No sore throat, pain, runny nose  CV:  No pain, palpitations  Pulm:  No dyspnea, cough  GI:  See HPI, otherwise negative  :  No  dysuria  Muscle:  No pain, weakness  Neuro:  No memory problems  Skin:  No rash    PHYSICAL EXAM:   Vital Signs:  Vital Signs Last 24 Hrs  T(C): 37.1 (19 Mar 2024 07:52), Max: 37.4 (19 Mar 2024 00:31)  T(F): 98.7 (19 Mar 2024 07:52), Max: 99.3 (19 Mar 2024 00:31)  HR: 57 (19 Mar 2024 07:52) (52 - 57)  BP: 115/88 (19 Mar 2024 07:52) (93/59 - 121/96)  BP(mean): --  RR: 17 (19 Mar 2024 07:52) (17 - 17)  SpO2: 95% (19 Mar 2024 07:52) (94% - 96%)    Parameters below as of 19 Mar 2024 07:52  Patient On (Oxygen Delivery Method): room air      Daily Height in cm: 177.8 (18 Mar 2024 18:02)    Daily     GENERAL: no acute distress  NEURO: AAOx3, no asterixis  HEENT: anicteric sclera, no conjunctival pallor appreciated  CHEST: no respiratory distress, no accessory muscle use  CARDIAC: regular rate, rhythm  ABDOMEN: soft, obese, mild epigastric tenderness, no rebound or guarding, BS+  EXTREMITIES: warm, well perfused, no edema  SKIN: no lrash    LABS: reviewed                        15.9   4.92  )-----------( 112      ( 19 Mar 2024 04:55 )             46.6     03-19    136  |  108  |  25<H>  ----------------------------<  89  4.0   |  24  |  1.11    Ca    8.3<L>      19 Mar 2024 04:55  Phos  2.5     03-19  Mg     1.9     03-19    TPro  5.6<L>  /  Alb  2.5<L>  /  TBili  0.7  /  DBili  x   /  AST  525<H>  /  ALT  732<H>  /  AlkPhos  78  03-19    LIVER FUNCTIONS - ( 19 Mar 2024 04:55 )  Alb: 2.5 g/dL / Pro: 5.6 g/dL / ALK PHOS: 78 U/L / ALT: 732 U/L DA / AST: 525 U/L / GGT: x               Diagnostic Studies: see sunrise for full report

## 2024-03-19 NOTE — CONSULT NOTE ADULT - ASSESSMENT
78y Male w/ Hx of Type 2 DM, HTN, HLD, hypothyroidism, obesity, CAD s/p stent x5 and CABG, c/p cardiac arrest (2014), CHF (LVEF 30%), s/p PPM/AICD, pAfib, mitral valve "clipping", CKD stage 3, recurrent UTIs, indwelling Starr, LUCIA, gastroparesis, recent COVID-19 (3/8/24), abnormal bladder imaging (pending biopsy) and vertigo presented to [facility] ER on 3/18/24 s/p fall, dizziness, w/o LOC, but hitting his head.  On admission he was found to have severe hepatocellular liver enzyme elevation for what hepatology was consulted.     Severe hepatocellular liver enzyme elevation  Normal cholestatic parameters  Unable to accurately assess synthetic function, his INR if affected by being on coumadin, hypoalbuminemia might be partially nutritional etc.  On the other hand patient does have cirrhotic appearing liver on imaging and has risk for MASH vs. cardiac cirrhosis.   Uncertain if he had undocumented hypotension during his fall at  home  COVID-19 might be a/w abnormal liver enzymes as well, although usually lower levels  Liver w/u so far: BAL neg, Acetaminophen level neg.     INCOMPLETE NOTE, FULL NOTE TO FOLLOW 78y Male w/ Hx of Type 2 DM, HTN, HLD, hypothyroidism, obesity, CAD s/p stent x5 and CABG, c/p cardiac arrest (2014), CHF (LVEF 30%), s/p PPM/AICD, pAfib, mitral valve "clipping", CKD stage 3, recurrent UTIs, indwelling Starr, LUCIA, gastroparesis, abnormal bladder imaging (pending biopsy) and vertigo presented to [facility] ER on 3/18/24 s/p fall, dizziness, w/o LOC, but hitting his head.  On admission he was found to have severe hepatocellular liver enzyme elevation for what hepatology was consulted. Noted to be COVID-19 pos.    Severe hepatocellular liver enzyme elevation  Normal cholestatic parameters  Unable to accurately assess synthetic function, his INR if affected by being on coumadin, hypoalbuminemia might be partially nutritional etc.  On the other hand patient does have cirrhotic appearing liver on imaging and has risk for MASH vs. cardiac cirrhosis. Reports Hx of fatty liver.  Elevated liver enzymes are not explained by his cirrhosis alone.  Differential includes ischemic vs. infectious vs. drug induced vs. other.  Uncertain if he had undocumented hypotension during his fall at  home, but it is in differential.  COVID-19 might be a/w abnormal liver enzymes as well, although usually lower levels.  Liver w/u so far: BAL neg, Acetaminophen level neg. HCV ab neg.    - C/w monitoring LFTs, MELD labs  - Supportive measures, COVID-19 mgmt.per primary team  - Please, obtain Hep A IgM, HBsAg, HBcAb, HBsAb, HBcAb IgM, HCV RNA, Hep E IgM/PCR, CHASTITY, SMA, Ig panel, ferritin, iron/TIBC, salicylate level, Utox, Peth, EBV, CMV, HSV PCRs  - Please, obtain TTE  - Please, obtain RUQ US      Thank you for consult  Will continue to monitor  D/w primary team

## 2024-03-19 NOTE — PROGRESS NOTE ADULT - PROBLEM SELECTOR PLAN 4
reportedly has long history of vertigo  not on meclizine  meclizine 25 mg q8h standing for now  PT chanceal

## 2024-03-19 NOTE — PROGRESS NOTE ADULT - NS ATTEND AMEND GEN_ALL_CORE FT
Pt is a 79 yo M from home, ambulates with cane, with CAD x5 stents on warfarin and plavix, cardiac arrest in 2014, CHF (EF 30%), PPM/AICD, paroxysmal AF, CKD3, chronic vertigo, DM2, chronic Starr, recurrent UTI, hypothyroid, hx mitral valve clipping, "bladder spot" requiring further o/p workup, presenting s/p fall backwards. Patient reports that he has had almost 10 falls in last one year- all being mechanical in nature. He denies any LOC.    Pt was seen and examined, feels weak over all. Family at bedside was concerned about his ongoing slow progressive memory loss and also weakness.     Labs reviewed- cbc, bmp,

## 2024-03-19 NOTE — ED ADULT NURSE REASSESSMENT NOTE - NS ED NURSE REASSESS COMMENT FT1
Received pt awake, alert and oriented x 3. No complaints voiced. Isolation precautions in place. Awaiting bed assignment.

## 2024-03-19 NOTE — PROGRESS NOTE ADULT - PROBLEM SELECTOR PLAN 3
significant transaminitis  CT A/P reveals LIVER: Cirrhotic morphology.  f/u RUQ U/S  f/u hepatitis panel  Acetaminophen level unremarkable  trend LFTs  Avoid hepatotoxic meds

## 2024-03-20 DIAGNOSIS — D75.1 SECONDARY POLYCYTHEMIA: ICD-10-CM

## 2024-03-20 LAB
ALBUMIN SERPL ELPH-MCNC: 2.6 G/DL — LOW (ref 3.5–5)
ALP SERPL-CCNC: 80 U/L — SIGNIFICANT CHANGE UP (ref 40–120)
ALT FLD-CCNC: 689 U/L DA — HIGH (ref 10–60)
AMPHET UR-MCNC: NEGATIVE — SIGNIFICANT CHANGE UP
ANION GAP SERPL CALC-SCNC: 7 MMOL/L — SIGNIFICANT CHANGE UP (ref 5–17)
APTT BLD: 49.3 SEC — HIGH (ref 24.5–35.6)
AST SERPL-CCNC: 521 U/L — HIGH (ref 10–40)
BARBITURATES UR SCN-MCNC: NEGATIVE — SIGNIFICANT CHANGE UP
BENZODIAZ UR-MCNC: NEGATIVE — SIGNIFICANT CHANGE UP
BILIRUB SERPL-MCNC: 1 MG/DL — SIGNIFICANT CHANGE UP (ref 0.2–1.2)
BUN SERPL-MCNC: 19 MG/DL — HIGH (ref 7–18)
CALCIUM SERPL-MCNC: 8.8 MG/DL — SIGNIFICANT CHANGE UP (ref 8.4–10.5)
CHLORIDE SERPL-SCNC: 107 MMOL/L — SIGNIFICANT CHANGE UP (ref 96–108)
CO2 SERPL-SCNC: 23 MMOL/L — SIGNIFICANT CHANGE UP (ref 22–31)
COCAINE METAB.OTHER UR-MCNC: NEGATIVE — SIGNIFICANT CHANGE UP
CREAT SERPL-MCNC: 0.88 MG/DL — SIGNIFICANT CHANGE UP (ref 0.5–1.3)
EGFR: 89 ML/MIN/1.73M2 — SIGNIFICANT CHANGE UP
FERRITIN SERPL-MCNC: 1623 NG/ML — HIGH (ref 30–400)
GLUCOSE BLDC GLUCOMTR-MCNC: 103 MG/DL — HIGH (ref 70–99)
GLUCOSE BLDC GLUCOMTR-MCNC: 108 MG/DL — HIGH (ref 70–99)
GLUCOSE BLDC GLUCOMTR-MCNC: 80 MG/DL — SIGNIFICANT CHANGE UP (ref 70–99)
GLUCOSE BLDC GLUCOMTR-MCNC: 90 MG/DL — SIGNIFICANT CHANGE UP (ref 70–99)
GLUCOSE BLDC GLUCOMTR-MCNC: 93 MG/DL — SIGNIFICANT CHANGE UP (ref 70–99)
GLUCOSE SERPL-MCNC: 110 MG/DL — HIGH (ref 70–99)
HAV IGM SER-ACNC: SIGNIFICANT CHANGE UP
HBV CORE IGM SER-ACNC: SIGNIFICANT CHANGE UP
HBV SURFACE AB SER-ACNC: 23.4 MIU/ML — SIGNIFICANT CHANGE UP
HBV SURFACE AG SER-ACNC: SIGNIFICANT CHANGE UP
HCT VFR BLD CALC: 50.7 % — HIGH (ref 39–50)
HCV AB S/CO SERPL IA: 0.08 S/CO — SIGNIFICANT CHANGE UP (ref 0–0.99)
HCV AB SERPL-IMP: SIGNIFICANT CHANGE UP
HGB BLD-MCNC: 17.5 G/DL — HIGH (ref 13–17)
HSV DNA1: SIGNIFICANT CHANGE UP
HSV DNA2: SIGNIFICANT CHANGE UP
HSV1 DNA BLD QL NAA+PROBE: SIGNIFICANT CHANGE UP
HSV2 DNA BLD QL NAA+PROBE: SIGNIFICANT CHANGE UP
INR BLD: 1.97 RATIO — HIGH (ref 0.85–1.18)
IRON SATN MFR SERPL: 11 % — LOW (ref 20–55)
IRON SATN MFR SERPL: 24 UG/DL — LOW (ref 65–170)
MAGNESIUM SERPL-MCNC: 1.7 MG/DL — SIGNIFICANT CHANGE UP (ref 1.6–2.6)
MCHC RBC-ENTMCNC: 33.2 PG — SIGNIFICANT CHANGE UP (ref 27–34)
MCHC RBC-ENTMCNC: 34.5 GM/DL — SIGNIFICANT CHANGE UP (ref 32–36)
MCV RBC AUTO: 96.2 FL — SIGNIFICANT CHANGE UP (ref 80–100)
METHADONE UR-MCNC: NEGATIVE — SIGNIFICANT CHANGE UP
NRBC # BLD: 0 /100 WBCS — SIGNIFICANT CHANGE UP (ref 0–0)
OPIATES UR-MCNC: NEGATIVE — SIGNIFICANT CHANGE UP
PCP SPEC-MCNC: SIGNIFICANT CHANGE UP
PCP UR-MCNC: NEGATIVE — SIGNIFICANT CHANGE UP
PHOSPHATE SERPL-MCNC: 2.3 MG/DL — LOW (ref 2.5–4.5)
PLATELET # BLD AUTO: 107 K/UL — LOW (ref 150–400)
POTASSIUM SERPL-MCNC: 3.7 MMOL/L — SIGNIFICANT CHANGE UP (ref 3.5–5.3)
POTASSIUM SERPL-SCNC: 3.7 MMOL/L — SIGNIFICANT CHANGE UP (ref 3.5–5.3)
PROT SERPL-MCNC: 5.8 G/DL — LOW (ref 6–8.3)
PROTHROM AB SERPL-ACNC: 22 SEC — HIGH (ref 9.5–13)
RBC # BLD: 5.27 M/UL — SIGNIFICANT CHANGE UP (ref 4.2–5.8)
RBC # FLD: 14.8 % — HIGH (ref 10.3–14.5)
SALICYLATES SERPL-MCNC: 2.2 MG/DL — LOW (ref 2.8–20)
SODIUM SERPL-SCNC: 137 MMOL/L — SIGNIFICANT CHANGE UP (ref 135–145)
THC UR QL: NEGATIVE — SIGNIFICANT CHANGE UP
TIBC SERPL-MCNC: 225 UG/DL — LOW (ref 250–450)
UIBC SERPL-MCNC: 201 UG/DL — SIGNIFICANT CHANGE UP (ref 110–370)
WBC # BLD: 5.5 K/UL — SIGNIFICANT CHANGE UP (ref 3.8–10.5)
WBC # FLD AUTO: 5.5 K/UL — SIGNIFICANT CHANGE UP (ref 3.8–10.5)

## 2024-03-20 PROCEDURE — 99232 SBSQ HOSP IP/OBS MODERATE 35: CPT

## 2024-03-20 PROCEDURE — 99223 1ST HOSP IP/OBS HIGH 75: CPT | Mod: FS

## 2024-03-20 PROCEDURE — 99233 SBSQ HOSP IP/OBS HIGH 50: CPT | Mod: GC

## 2024-03-20 RX ORDER — CEFTRIAXONE 500 MG/1
1000 INJECTION, POWDER, FOR SOLUTION INTRAMUSCULAR; INTRAVENOUS EVERY 24 HOURS
Refills: 0 | Status: COMPLETED | OUTPATIENT
Start: 2024-03-21 | End: 2024-03-21

## 2024-03-20 RX ORDER — WARFARIN SODIUM 2.5 MG/1
2.5 TABLET ORAL ONCE
Refills: 0 | Status: COMPLETED | OUTPATIENT
Start: 2024-03-20 | End: 2024-03-20

## 2024-03-20 RX ADMIN — BRIMONIDINE TARTRATE 1 DROP(S): 2 SOLUTION/ DROPS OPHTHALMIC at 21:18

## 2024-03-20 RX ADMIN — WARFARIN SODIUM 2.5 MILLIGRAM(S): 2.5 TABLET ORAL at 00:02

## 2024-03-20 RX ADMIN — CLOPIDOGREL BISULFATE 75 MILLIGRAM(S): 75 TABLET, FILM COATED ORAL at 21:18

## 2024-03-20 RX ADMIN — CLOPIDOGREL BISULFATE 75 MILLIGRAM(S): 75 TABLET, FILM COATED ORAL at 00:02

## 2024-03-20 RX ADMIN — CARVEDILOL PHOSPHATE 3.12 MILLIGRAM(S): 80 CAPSULE, EXTENDED RELEASE ORAL at 06:26

## 2024-03-20 RX ADMIN — ATORVASTATIN CALCIUM 20 MILLIGRAM(S): 80 TABLET, FILM COATED ORAL at 00:02

## 2024-03-20 RX ADMIN — INSULIN GLARGINE 30 UNIT(S): 100 INJECTION, SOLUTION SUBCUTANEOUS at 08:15

## 2024-03-20 RX ADMIN — Medication 25 MILLIGRAM(S): at 21:18

## 2024-03-20 RX ADMIN — CEFTRIAXONE 100 MILLIGRAM(S): 500 INJECTION, POWDER, FOR SOLUTION INTRAMUSCULAR; INTRAVENOUS at 06:26

## 2024-03-20 RX ADMIN — Medication 25 MILLIGRAM(S): at 00:02

## 2024-03-20 RX ADMIN — Medication 3 UNIT(S): at 12:20

## 2024-03-20 RX ADMIN — AMIODARONE HYDROCHLORIDE 400 MILLIGRAM(S): 400 TABLET ORAL at 06:26

## 2024-03-20 RX ADMIN — Medication 40 MILLIGRAM(S): at 06:26

## 2024-03-20 RX ADMIN — Medication 25 MILLIGRAM(S): at 15:10

## 2024-03-20 RX ADMIN — LATANOPROST 1 DROP(S): 0.05 SOLUTION/ DROPS OPHTHALMIC; TOPICAL at 00:04

## 2024-03-20 RX ADMIN — Medication 25 MILLIGRAM(S): at 06:27

## 2024-03-20 RX ADMIN — Medication 100 MICROGRAM(S): at 06:26

## 2024-03-20 RX ADMIN — WARFARIN SODIUM 2.5 MILLIGRAM(S): 2.5 TABLET ORAL at 21:18

## 2024-03-20 RX ADMIN — SPIRONOLACTONE 25 MILLIGRAM(S): 25 TABLET, FILM COATED ORAL at 06:26

## 2024-03-20 RX ADMIN — LATANOPROST 1 DROP(S): 0.05 SOLUTION/ DROPS OPHTHALMIC; TOPICAL at 21:19

## 2024-03-20 RX ADMIN — Medication 100 MILLIGRAM(S): at 21:18

## 2024-03-20 RX ADMIN — BRIMONIDINE TARTRATE 1 DROP(S): 2 SOLUTION/ DROPS OPHTHALMIC at 00:03

## 2024-03-20 NOTE — PROGRESS NOTE ADULT - PROBLEM SELECTOR PLAN 9
appears to be on 30 U lantus bid + sliding scale regular insulin???  appears to be on ozempic now?--unclear why given PMH indicats hx of gastroparesis  -HEATHER for now, titrate up, as needed, basal lantus 30 u in a.m., nutritional 3 U + HEATHER  -A1c 6.9

## 2024-03-20 NOTE — PROGRESS NOTE ADULT - SUBJECTIVE AND OBJECTIVE BOX
PGY-1 Progress Note discussed with attending    PAGER #: [[phone]] TILL 5:00 PM  PLEASE CONTACT ON CALL TEAM:  - On Call Team (Please refer to Primo) FROM 5:00 PM - 8:30PM  - Nightfloat Team FROM 8:30 -7:30 AM    CC: Patient is a 78y old  Male who presents with a chief complaint of UTI, fall (19 Mar 2024 16:21)      OVERNIGHT EVENTS: admit to 5N    SUBJECTIVE / INTERVAL HPI: Patient seen and examined at bedside. Says he is feeling "so so". Complaining of chronic cough for last month and persistent vertigo symptoms worse at night when turning his head. Also endorsing some cognitive slowing while trying to describe events leading to hospitalization. Describes episode of fall again, saying that he was shaving at the time and suddenly felt disoriented while looking in the mirror and then symptoms started to "spiral" and he fell backwards unable to fully catch himself by grasping the sink. Denies fever, chills, headache, dizziness, chest pain, palpitations, shortness of breath, abdominal pain, nausea, vomiting, diarrhea, constipation, and dysuria.      ROS:  CONSTITUTIONAL: No weakness, fevers or chills  EYES/ENT: No visual changes;  +vertigo, no tinnitus, no ear fullness, no throat pain   NECK: No pain or stiffness  RESPIRATORY: +cough, no wheezing, hemoptysis; No shortness of breath  CARDIOVASCULAR: No chest pain or palpitations  GASTROINTESTINAL: No abdominal or epigastric pain. No nausea, vomiting, or hematemesis; No diarrhea or constipation. No melena or hematochezia.  GENITOURINARY: No dysuria, frequency or hematuria  NEUROLOGICAL: No numbness or weakness  SKIN: No itching, burning, rashes, or lesions     VITAL SIGNS:  Vital Signs Last 24 Hrs  T(C): 36.8 (20 Mar 2024 11:25), Max: 37.2 (19 Mar 2024 23:59)  T(F): 98.2 (20 Mar 2024 11:25), Max: 98.9 (19 Mar 2024 23:59)  HR: 55 (20 Mar 2024 11:25) (55 - 81)  BP: 101/55 (20 Mar 2024 11:25) (100/67 - 130/81)  BP(mean): --  RR: 19 (20 Mar 2024 11:25) (18 - 19)  SpO2: 94% (20 Mar 2024 11:25) (94% - 96%)    Parameters below as of 20 Mar 2024 11:25  Patient On (Oxygen Delivery Method): room air        PHYSICAL EXAM:    General: WDWN, no acute distress  HEENT: NC/AT; PERRL, anicteric sclera; MMM  Neck: supple  Cardiovascular: +S1/S2; RRR  Respiratory: mild bibasilar rales; no Wheezing  Gastrointestinal: soft, NT/mildly distended; +BSx4, no RUQ tenderness  Extremities: WWP; no edema, clubbing or cyanosis  Vascular: 2+ radial, DP/PT pulses B/L  Skin: Warm, dry, good turgor, no rashes, extensive ecchymoses over forearms and hands  Neurological: AAOx3; CNII-XII grossly intact, right horizontal gaze evoked nystagmus, preserved and equal strength in all extremities    MEDICATIONS:  MEDICATIONS  (STANDING):  aMIOdarone    Tablet 400 milliGRAM(s) Oral daily  atorvastatin 20 milliGRAM(s) Oral at bedtime  brimonidine 0.2% Ophthalmic Solution 1 Drop(s) Both EYES at bedtime  carvedilol 3.125 milliGRAM(s) Oral every 12 hours  cefTRIAXone   IVPB 1000 milliGRAM(s) IV Intermittent every 24 hours  clopidogrel Tablet 75 milliGRAM(s) Oral at bedtime  furosemide    Tablet 40 milliGRAM(s) Oral daily  insulin glargine Injectable (LANTUS) 30 Unit(s) SubCutaneous every morning  insulin lispro (ADMELOG) corrective regimen sliding scale   SubCutaneous three times a day before meals  insulin lispro (ADMELOG) corrective regimen sliding scale   SubCutaneous at bedtime  insulin lispro Injectable (ADMELOG) 3 Unit(s) SubCutaneous three times a day before meals  latanoprost 0.005% Ophthalmic Solution 1 Drop(s) Both EYES at bedtime  levothyroxine 100 MICROGram(s) Oral daily  meclizine 25 milliGRAM(s) Oral three times a day  spironolactone 25 milliGRAM(s) Oral daily  warfarin 2.5 milliGRAM(s) Oral once    MEDICATIONS  (PRN):  acetaminophen     Tablet .. 650 milliGRAM(s) Oral every 6 hours PRN Temp greater or equal to 38C (100.4F), Mild Pain (1 - 3)  melatonin 3 milliGRAM(s) Oral at bedtime PRN Insomnia  ondansetron Injectable 4 milliGRAM(s) IV Push every 8 hours PRN Nausea and/or Vomiting      ALLERGIES:  Allergies    No Known Allergies    Intolerances        LABS:                        17.5   5.50  )-----------( 107      ( 20 Mar 2024 06:13 )             50.7     03-20    137  |  107  |  19<H>  ----------------------------<  110<H>  3.7   |  23  |  0.88    Ca    8.8      20 Mar 2024 06:13  Phos  2.3     03-20  Mg     1.7     03-20    TPro  5.8<L>  /  Alb  2.6<L>  /  TBili  1.0  /  DBili  x   /  AST  521<H>  /  ALT  689<H>  /  AlkPhos  80  03-20    PT/INR - ( 20 Mar 2024 06:13 )   PT: 22.0 sec;   INR: 1.97 ratio         PTT - ( 20 Mar 2024 06:13 )  PTT:49.3 sec  Urinalysis Basic - ( 20 Mar 2024 06:13 )    Color: x / Appearance: x / SG: x / pH: x  Gluc: 110 mg/dL / Ketone: x  / Bili: x / Urobili: x   Blood: x / Protein: x / Nitrite: x   Leuk Esterase: x / RBC: x / WBC x   Sq Epi: x / Non Sq Epi: x / Bacteria: x      CAPILLARY BLOOD GLUCOSE      POCT Blood Glucose.: 108 mg/dL (20 Mar 2024 11:47)      RADIOLOGY & ADDITIONAL TESTS:     < from: CT Abdomen and Pelvis w/ IV Cont (03.18.24 @ 20:33) >  ACC: [id number] EXAM:  CT CHEST IC   ORDERED BY: JOSÉ LUIS RENE     ACC: [id number] EXAM:  CT ABDOMEN AND PELVIS IC   ORDERED BY: JOSÉ LUIS RENE     PROCEDURE DATE:  03/18/2024          INTERPRETATION:  CLINICAL INFORMATION: Trauma    COMPARISON:None.    CONTRAST/COMPLICATIONS:  IV Contrast: Omnipaque 350  90 cc administered   10 cc discarded  Oral Contrast: NONE  Complications: None reported at time of study completion    PROCEDURE:  CT of the Chest, Abdomen and Pelvis was performed.  Sagittal and coronal reformats were performed.    FINDINGS:  CHEST:  LUNGS AND LARGE AIRWAYS: Patent central airways. Multifocal groundglass   opacities are nonspecific but may represent areas of   infection/inflammation.  PLEURA: No pleural effusion.  VESSELS: Atherosclerotic changes of the aorta and coronary arteries.  HEART: Heart size is normal. Cardiac pacemaker in place. No pericardial   effusion.  MEDIASTINUM AND SHAWNA: No lymphadenopathy.  CHEST WALL AND LOWER NECK: Within normal limits.    ABDOMEN AND PELVIS:  LIVER: Cirrhotic morphology.  BILE DUCTS: Normal caliber.  GALLBLADDER: Within normal limits.  SPLEEN: Within normal limits.  PANCREAS: Within normal limits.  ADRENALS: Within normal limits.  KIDNEYS/URETERS: Within normal limits.    BLADDER: Markedly thick-walled bladder with Starr catheter in place.   Irregular bladder wall enhancement.  REPRODUCTIVE ORGANS: Prostatic calcifications.    BOWEL: No bowel obstruction. Appendix is normal.  PERITONEUM: No ascites.  VESSELS: Atherosclerotic changes.. Extensive small vessel vascular   calcifications likely related to diabetes.  RETROPERITONEUM/LYMPH NODES: No lymphadenopathy.  ABDOMINAL WALL: There is infiltration of the subcutaneous fat along the   superior abdominal wall extending to the level of the umbilicus. Within   the deep subcutaneous tissues, there is a linear area with calcifications.  BONES: Median sternotomy with sternal wires. No evidence of displaced   fractures. L4-5 grade 1 spondylolisthesis.    IMPRESSION:  1. Multifocal groundglass opacities which are nonspecific but may be   related to known COVID infection.  2.  Hepatic cirrhosis.  3.  Markedly abnormal thick walled bladder with Starr catheter in place.   Cystoscopy recommended.    < end of copied text >  < from: CT Head No Cont (03.18.24 @ 20:33) >  ACC: [id number] EXAM:  CT CERVICAL SPINE   ORDERED BY: JOSÉ LUIS RENE     ACC: [id number] EXAM:  CT BRAIN   ORDERED BY: JOSÉ LUIS RENE     PROCEDURE DATE:  03/18/2024          INTERPRETATION:  INDICATION: Headache with neck pain status post trauma.  TECHNIQUE:  A non contrast 2.5mm axial CT study of the brain was   performed from skull base to vertex. Coronal and sagittal reformations   were generated from the axial data.  COMPARISON EXAMINATION:  No prior    FINDINGS:    HEMISPHERES:  There are involutional changes with volume loss,   commensurate with age. No acute infarct, hemorrhage, or mass identified.  VENTRICLES:  Midline with mild ex vacuo enlargement  POSTERIOR FOSSA:  The brain stem and cerebellum are unremarkable.  No CP   angle lesion noted.  EXTRACEREBRAL SPACES:  No subdural or epidural collections are noted.  SKULL BASE AND CALVARIUM:  Appears intact.  No fracture or destructive   lesion is identified.  SINUSES AND MASTOIDS:  Inflammatory changes are noted in the sinuses.  Mastoid cells are clear.  MISCELLANEOUS: No orbital or pituitary abnormality noted.    CT CERVICAL    INDICATIONS:  neck pain. Trauma.  TECHNIQUE:  Thin section CT imaging was conducted.  3-D, Coronal and   sagittal reformations were generated fromthe axial data.    FINDINGS:    There is alteration of the cervical lordosis which may reflect   positioning or spasm.    C1/C2  :  The anterior and posterior arches of C1 appear to be intact.   There is no C1-C2 subluxation. No odontoid fracture is noted. Base of C2   appears to be intact    The mid and lower cervical vertebral bodies appear to be intact. No upper   thoracic fracture is noted.    Chronic degenerative changes are noted at C3-4 C4-5 and C5-6 and C6-7.   There are spondylotic and arthritic changes. Ridge disc complexes   diffusely narrowed canal. The foramina are narrowed at multiple levels.    Prevertebral soft tissues are unremarkable. Calcified plaque is noted in   the neck vasculature.      BRAIN  IMPRESSION:    1)  involutional change and volume loss, commensurate with age. No acute   abnormality suggested.  2)  no intracerebral hemorrhage, contusion, or extracerebral hemorrhagic   collections identified.    CERVICAL IMPRESSION:    Chronic degenerative changes C3-C7 with central and foraminal narrowing   at these levels. No acute fracture identified.    --- End of Report ---    < end of copied text >

## 2024-03-20 NOTE — PATIENT PROFILE ADULT - FALL HARM RISK - HARM RISK INTERVENTIONS
Assistance with ambulation/Assistance OOB with selected safe patient handling equipment/Communicate Risk of Fall with Harm to all staff/Discuss with provider need for PT consult/Monitor gait and stability/Provide patient with walking aids - walker, cane, crutches/Reinforce activity limits and safety measures with patient and family/Sit up slowly, dangle for a short time, stand at bedside before walking/Tailored Fall Risk Interventions/Visual Cue: Yellow wristband and red socks/Bed in lowest position, wheels locked, appropriate side rails in place/Call bell, personal items and telephone in reach/Instruct patient to call for assistance before getting out of bed or chair/Non-slip footwear when patient is out of bed/[place] to call system/Physically safe environment - no spills, clutter or unnecessary equipment/Purposeful Proactive Rounding/Room/bathroom lighting operational, light cord in reach

## 2024-03-20 NOTE — PROGRESS NOTE ADULT - ASSESSMENT
78y Male w/ Hx of Type 2 DM, HTN, HLD, hypothyroidism, obesity, CAD s/p stent x5 and CABG, c/p cardiac arrest (2014), CHF (LVEF 30%), s/p PPM/AICD, pAfib, mitral valve "clipping", CKD stage 3, recurrent UTIs, indwelling Starr, LUCIA, gastroparesis, abnormal bladder imaging (pending biopsy) and vertigo presented to [facility] ER on 3/18/24 s/p fall, dizziness, w/o LOC, but hitting his head.  On admission he was found to have severe hepatocellular liver enzyme elevation for what hepatology was consulted. Noted to be COVID-19 pos.    Severe hepatocellular liver enzyme elevation  Normal cholestatic parameters  Unable to accurately assess synthetic function, his INR if affected by being on coumadin, hypoalbuminemia might be partially nutritional etc.  On the other hand patient does have cirrhotic appearing liver on imaging and has risk for MASH vs. cardiac cirrhosis. Reports Hx of fatty liver.  Elevated liver enzymes are not explained by his cirrhosis alone.  Differential includes ischemic vs. infectious vs. drug induced vs. other.  Uncertain if he had undocumented hypotension during his fall at  home, but it is in differential.  COVID-19 might be a/w abnormal liver enzymes as well, although usually lower levels.  Liver w/u so far: BAL neg, Acetaminophen level neg. HCV ab neg.    - C/w monitoring LFTs, MELD labs  - Supportive measures, COVID-19 mgmt.per primary team  - Please, obtain Hep A IgM, HBsAg, HBcAb, HBsAb, HBcAb IgM, HCV RNA, Hep E IgM/PCR, CHASTITY, SMA, Ig panel, ferritin, iron/TIBC, salicylate level, Utox, Peth, EBV, CMV, HSV PCRs  - Please, obtain TTE  - Please, obtain RUQ US      Thank you for consult  Will continue to monitor  D/w primary team   78y Male w/ Hx of Type 2 DM, HTN, HLD, hypothyroidism, obesity, CAD s/p stent x5 and CABG, c/p cardiac arrest (2014), CHF (LVEF 30%), s/p PPM/AICD, pAfib, mitral valve "clipping", CKD stage 3, recurrent UTIs, indwelling Starr, LUCIA, gastroparesis, abnormal bladder imaging (pending biopsy) and vertigo presented to [facility] ER on 3/18/24 s/p fall, dizziness, w/o LOC, but hitting his head.  On admission he was found to have severe hepatocellular liver enzyme elevation for what hepatology was consulted. Noted to be COVID-19 pos which is reportedly since 3/8 (?).     # Severe hepatocellular liver enzyme elevation w/ normal cholestatic parameters  - Differential includes ischemic vs. infectious vs. drug induced vs. autoimmune vs. other.  - Cannot rule out that he had undocumented hypotension during his fall at  home  - COVID-19 known to be a/w abnormal liver enzymes as well, although usually lower levels.  - AIH triggered by COVID-19 has been reported in few cases  - Denies pain medication use, but given his memory problems, would need to confirm w/ family if he took any OTC medications recently  - Liver w/u so far: BAL neg, Acetaminophen level neg. HCV ab neg. HSV PCR neg.   Rec:  - Please, hold atorvastatin  - Please, obtain collateral information regarding timing of amiodarone.  - Please, send CPK  - Please, obtain / follow up Hep A IgM, HBsAg, HBcAb, HBsAb, HBcAb IgM, HCV RNA, Hep E IgM/PCR, CHASTITY, SMA, LKM, AMA, Ig panel, Peth, EBV, CMV, VZV PCRs  - C/w supportive measures for COVID-19 per primary team  - Please, obtain TTE      # Cirrhosis?  - MELD 3.0 - not accurate b/o coumadin  - CPT - not accurate b/o coumadin, but even with that would be max. CPT B  - Cirrhotic appearing liver on imaging and reports Hx of fatty liver. He has metabolic risk factors thus at risk for MASH cirrhosis, but also for cardiac cirrhosis, and being on amiodarone can also lead to cirrhosis over time.   - Unable to accurately assess liver synthetic function, because his INR is affected by being on coumadin, and hypoalbuminemia might be partially nutritional etc.  - No imaging signs of portal hypertension, and his PLT count has been normal even in 12/2023 and thrombocytopenia noted only during this admission, thus uncertain whether it signals CSPH or related to his COVID.   - Elastography not available inpatient and would not be accurate either in setting of severe liver injury / inflammation  - Notably, the acute rise of his liver enzymes is not explained by his cirrhosis alone.  - No overt HE.  - No Ascites.  HANK resolved.   - HCC status: no focal hepatic lesion on CT a/p w/ iv  - EV status: unknown.   Rec:  - Follow up CDL workup, including Hep B, C serologies, A1AT, AI workup, ceruloplasmin, and repeat iron studies in outpatient setting  - He can have elastography outpatient once acute issues resolved and liver enzymes improved.  - C/w monitoring LFTs, MELD labs  - Will need collateral information about length of amiodarone use  - Please, obtain TTE  - Avoid hepatotoxic medications, change Tylenol order, limit < 2g (preferred to be avoided for now)        Thank you for consult  Will continue to monitor  D/w primary team   78y Male w/ Hx of Type 2 DM, HTN, HLD, hypothyroidism, obesity, CAD s/p stent x5 and CABG, c/p cardiac arrest (2014), CHF (LVEF 30%), s/p PPM/AICD, pAfib, mitral valve "clipping", CKD stage 3, recurrent UTIs, indwelling Starr, LUCIA, gastroparesis, abnormal bladder imaging (pending biopsy) and vertigo presented to [facility] ER on 3/18/24 s/p fall, dizziness, w/o LOC, but hitting his head.  On admission he was found to have severe hepatocellular liver enzyme elevation for what hepatology was consulted. Noted to be COVID-19 pos which is reportedly since 3/8 (?).     # Severe hepatocellular liver enzyme elevation w/ normal cholestatic parameters  - Differential includes ischemic vs. infectious vs. drug induced vs. autoimmune vs. other.  - Cannot rule out that he had undocumented hypotension during his fall at  home  - COVID-19 known to be a/w abnormal liver enzymes as well, although usually lower levels.  - AIH triggered by COVID-19 has been reported in few cases  - Denies pain medication use, but given his memory problems, would need to confirm w/ family if he took any OTC medications recently  - Liver w/u so far: BAL neg, Acetaminophen level neg. HCV ab neg. HSV PCR neg.     Rec:  - Please, hold atorvastatin  - Please, obtain collateral information regarding timing of amiodarone.  - Please, send CPK  - Please, obtain / follow up Hep A IgM, HBsAg, HBcAb, HBsAb, HBcAb IgM, HCV RNA, Hep E IgM/PCR, CHASTITY, SMA, LKM, AMA, Ig panel, Peth, EBV, CMV, VZV PCRs.   - C/w supportive measures for COVID-19 per primary team  - Will need repeat of ferritin and iron studies later  - Please, obtain TTE      # Cirrhosis?  - MELD 3.0 - not accurate b/o coumadin  - CPT - not accurate b/o coumadin, but even with that would be max. CPT B  - Cirrhotic appearing liver on imaging and reports Hx of fatty liver. He has metabolic risk factors thus at risk for MASH cirrhosis, but also for cardiac cirrhosis, and being on amiodarone can also lead to cirrhosis over time.   - Unable to accurately assess liver synthetic function, because his INR is affected by being on coumadin, and hypoalbuminemia might be partially nutritional etc.  - No imaging signs of portal hypertension, and his PLT count has been normal even in 12/2023 and thrombocytopenia noted only during this admission, thus uncertain whether it signals CSPH or related to his COVID.   - Elastography not available inpatient and would not be accurate either in setting of severe liver injury / inflammation  - Notably, the acute rise of his liver enzymes is not explained by his cirrhosis alone.  - No overt HE.  - No Ascites.  HANK resolved.   - HCC status: no focal hepatic lesion on CT a/p w/ iv  - EV status: unknown.     Rec:  - Follow up CDL workup, including Hep B, C serologies, A1AT, AI workup, ceruloplasmin, and repeat iron studies in outpatient setting  - He can have elastography outpatient once acute issues resolved and liver enzymes improved.  - C/w monitoring LFTs, MELD labs  - Will need collateral information about length of amiodarone use  - Please, obtain TTE  - Avoid hepatotoxic medications, change Tylenol order, limit < 2g (preferred to be avoided for now)        Thank you for consult  Will continue to monitor  D/w primary team

## 2024-03-20 NOTE — PROGRESS NOTE ADULT - SUBJECTIVE AND OBJECTIVE BOX
Chief Complaint:  Patient is a 78y old  Male who presents with a chief complaint of UTI, fall (20 Mar 2024 14:11)      Reason for consult:    Interval Events:     Hospital Medications:  acetaminophen     Tablet .. 650 milliGRAM(s) Oral every 6 hours PRN  aMIOdarone    Tablet 400 milliGRAM(s) Oral daily  atorvastatin 20 milliGRAM(s) Oral at bedtime  brimonidine 0.2% Ophthalmic Solution 1 Drop(s) Both EYES at bedtime  carvedilol 3.125 milliGRAM(s) Oral every 12 hours  cefTRIAXone   IVPB 1000 milliGRAM(s) IV Intermittent every 24 hours  clopidogrel Tablet 75 milliGRAM(s) Oral at bedtime  furosemide    Tablet 40 milliGRAM(s) Oral daily  insulin glargine Injectable (LANTUS) 30 Unit(s) SubCutaneous every morning  insulin lispro (ADMELOG) corrective regimen sliding scale   SubCutaneous three times a day before meals  insulin lispro (ADMELOG) corrective regimen sliding scale   SubCutaneous at bedtime  insulin lispro Injectable (ADMELOG) 3 Unit(s) SubCutaneous three times a day before meals  latanoprost 0.005% Ophthalmic Solution 1 Drop(s) Both EYES at bedtime  levothyroxine 100 MICROGram(s) Oral daily  meclizine 25 milliGRAM(s) Oral three times a day  melatonin 3 milliGRAM(s) Oral at bedtime PRN  ondansetron Injectable 4 milliGRAM(s) IV Push every 8 hours PRN  spironolactone 25 milliGRAM(s) Oral daily  warfarin 2.5 milliGRAM(s) Oral once      ROS:   General:  No  fevers, chills, night sweats, fatigue  Eyes:  Good vision, no reported pain  ENT:  No sore throat, pain, runny nose  CV:  No pain, palpitations  Pulm:  No dyspnea, cough  GI:  See HPI, otherwise negative  :  No  dysuria  Muscle:  No pain, weakness  Neuro:  No memory problems  Skin:  No rash        PHYSICAL EXAM:   Vital Signs:  Vital Signs Last 24 Hrs  T(C): 36.8 (20 Mar 2024 11:25), Max: 37.2 (19 Mar 2024 23:59)  T(F): 98.2 (20 Mar 2024 11:25), Max: 98.9 (19 Mar 2024 23:59)  HR: 55 (20 Mar 2024 11:25) (55 - 81)  BP: 101/55 (20 Mar 2024 11:25) (100/67 - 130/81)  BP(mean): --  RR: 19 (20 Mar 2024 11:25) (18 - 19)  SpO2: 94% (20 Mar 2024 11:25) (94% - 96%)    Parameters below as of 20 Mar 2024 11:25  Patient On (Oxygen Delivery Method): room air      Daily     Daily Weight in k (20 Mar 2024 04:38)    GENERAL: no acute distress  NEURO: AAOx3, no asterixis  HEENT: anicteric sclera, no conjunctival pallor appreciated  CHEST: no respiratory distress, no accessory muscle use  CARDIAC: regular rate, rhythm  ABDOMEN: soft, obese, mild epigastric tenderness, no rebound or guarding, BS+  EXTREMITIES: warm, well perfused, no edema  SKIN: no rash    LABS: reviewed                        17.5   5.50  )-----------( 107      ( 20 Mar 2024 06:13 )             50.7     03-20    137  |  107  |  19<H>  ----------------------------<  110<H>  3.7   |  23  |  0.88    Ca    8.8      20 Mar 2024 06:13  Phos  2.3     03-20  Mg     1.7     03-20    TPro  5.8<L>  /  Alb  2.6<L>  /  TBili  1.0  /  DBili  x   /  AST  521<H>  /  ALT  689<H>  /  AlkPhos  80  03-20    LIVER FUNCTIONS - ( 20 Mar 2024 06:13 )  Alb: 2.6 g/dL / Pro: 5.8 g/dL / ALK PHOS: 80 U/L / ALT: 689 U/L DA / AST: 521 U/L / GGT: x             Interval Diagnostic Studies: see sunrise for full report   Chief Complaint:  Patient is a 78y old  Male who presents with a chief complaint of UTI, fall (20 Mar 2024 14:11)      Reason for consult: Elevated liver enzymes    Interval Events: Patient was seen and examined at bedside. No new complaints beyond poor appetite. However, he reports that had even prior COVID. Denies other abdominal complaints. Been seen by neurology for memory deficit.    Hospital Medications:  acetaminophen     Tablet .. 650 milliGRAM(s) Oral every 6 hours PRN  aMIOdarone    Tablet 400 milliGRAM(s) Oral daily  atorvastatin 20 milliGRAM(s) Oral at bedtime  brimonidine 0.2% Ophthalmic Solution 1 Drop(s) Both EYES at bedtime  carvedilol 3.125 milliGRAM(s) Oral every 12 hours  cefTRIAXone   IVPB 1000 milliGRAM(s) IV Intermittent every 24 hours  clopidogrel Tablet 75 milliGRAM(s) Oral at bedtime  furosemide    Tablet 40 milliGRAM(s) Oral daily  insulin glargine Injectable (LANTUS) 30 Unit(s) SubCutaneous every morning  insulin lispro (ADMELOG) corrective regimen sliding scale   SubCutaneous three times a day before meals  insulin lispro (ADMELOG) corrective regimen sliding scale   SubCutaneous at bedtime  insulin lispro Injectable (ADMELOG) 3 Unit(s) SubCutaneous three times a day before meals  latanoprost 0.005% Ophthalmic Solution 1 Drop(s) Both EYES at bedtime  levothyroxine 100 MICROGram(s) Oral daily  meclizine 25 milliGRAM(s) Oral three times a day  melatonin 3 milliGRAM(s) Oral at bedtime PRN  ondansetron Injectable 4 milliGRAM(s) IV Push every 8 hours PRN  spironolactone 25 milliGRAM(s) Oral daily  warfarin 2.5 milliGRAM(s) Oral once      ROS:   General:  No  fevers, chills, night sweats, fatigue  Eyes:  Good vision, no reported pain  ENT:  No sore throat, pain, runny nose  CV:  No pain, palpitations  Pulm:  No dyspnea, cough  GI:  See HPI, otherwise negative  :  No  dysuria  Muscle:  No pain, weakness  Neuro:  No memory problems  Skin:  No rash        PHYSICAL EXAM:   Vital Signs:  Vital Signs Last 24 Hrs  T(C): 36.8 (20 Mar 2024 11:25), Max: 37.2 (19 Mar 2024 23:59)  T(F): 98.2 (20 Mar 2024 11:25), Max: 98.9 (19 Mar 2024 23:59)  HR: 55 (20 Mar 2024 11:25) (55 - 81)  BP: 101/55 (20 Mar 2024 11:25) (100/67 - 130/81)  BP(mean): --  RR: 19 (20 Mar 2024 11:25) (18 - 19)  SpO2: 94% (20 Mar 2024 11:25) (94% - 96%)    Parameters below as of 20 Mar 2024 11:25  Patient On (Oxygen Delivery Method): room air      Daily     Daily Weight in k (20 Mar 2024 04:38)    GENERAL: no acute distress  NEURO: AAOx3, no asterixis  HEENT: anicteric sclera, no conjunctival pallor appreciated  CHEST: no respiratory distress, no accessory muscle use  CARDIAC: regular rate, rhythm  ABDOMEN: soft, obese, mild epigastric tenderness, no rebound or guarding, BS+  EXTREMITIES: warm, well perfused, no edema  SKIN: no rash    LABS: reviewed                        17.5   5.50  )-----------( 107      ( 20 Mar 2024 06:13 )             50.7     03-20    137  |  107  |  19<H>  ----------------------------<  110<H>  3.7   |  23  |  0.88    Ca    8.8      20 Mar 2024 06:13  Phos  2.3     03-20  Mg     1.7     03-20    TPro  5.8<L>  /  Alb  2.6<L>  /  TBili  1.0  /  DBili  x   /  AST  521<H>  /  ALT  689<H>  /  AlkPhos  80  03-20    LIVER FUNCTIONS - ( 20 Mar 2024 06:13 )  Alb: 2.6 g/dL / Pro: 5.8 g/dL / ALK PHOS: 80 U/L / ALT: 689 U/L DA / AST: 521 U/L / GGT: x             Interval Diagnostic Studies: see sunrise for full report

## 2024-03-20 NOTE — PROGRESS NOTE ADULT - PROBLEM SELECTOR PLAN 5
hx of recurrent UTIs w/ chronic castellano, p/w R sided CVA tenderness  -UA positove  -Currently asymptomatic   -WBC WNL  -continue with ceftriaxone 1g IV for 3 days  -f/u Urine culture  -frequent UTI is also a contraindication for farxiga, unclear why patient is still on it

## 2024-03-20 NOTE — [facility]ED PRACT[facility] NURSE CONSULT - ASSESSMENT
This is a 78yr old male patient admitted for Weakness, presenting with an intact DTI to the Coccyx (1cm x 3cm) and R. Gluteus (1cm x 0.3cm) without drainage

## 2024-03-20 NOTE — CONSULT NOTE ADULT - SUBJECTIVE AND OBJECTIVE BOX
NEUROLOGY CONSULT NOTE    NAME:  FARHAT ROMO      ASSESSMENT:  78 RHM with history of mild cognitive impairment with memory loss presenting after a fall and worsening short-term memory, concerning for worsening of mild cognitive impairment vs. subacute necrotizing encephalopathy following recent COVID-19 infection      RECOMMENDATIONS:    - Check Vitamin B12, Folate, and TSH levels, and treat if abnormal    - Follow up on COVID-19 infection status, and provide supportive care as needed    - MRI Brain deferred due to presence of PPM/AICD    - Outpatient followed for neurocognitive evaluation and potential PET-FDG CT Brain for evaluation for cerebral hypometabolism    - DVT ppx: SCDs, Warfarin          NOTE TO BE COMPLETED - PLEASE REFER TO ABOVE ONLY AND IGNORE INFORMATION BELOW    *******************************      CHIEF COMPLAINT:  Patient is a 78y old  Male who presents with a chief complaint of UTI, fall (20 Mar 2024 12:27)      HPI:  Pt is a 79 yo M from home, ambulates with cane, with CAD x5 stents on warfarin and plavix, cardiac arrest in 2014, CHF (EF 30%), PPM/AICD, paroxysmal AF, CKD3, chronic vertigo, DM2, chronic castellano, recurrent UTI, hypothyroid, hx mitral valve clipping, "bladder spot" requiring further o/p workup, presenting s/p fall backwards. Pt is a limited narrator. He says that he has a long history of vertigo, and that he got out of the shower, was at the sink, and got dizzy, lost his balance, when he fell backwards and hit back of his head against shower door. He denies any loc, convulsions, tongue biting, loss of bowel/bladder control. He called his wife for help. He says that he has had a long history of vertigo, says nothing has helped him, but that Epley maneuver is mildly effective. He says he bumps into things a lot because of the vertigo, and that he has bruises on his arms from this. He says that he has had longstanding rhinorrhea. He does also endorse that he tested pos for Covid on 3/8.  Denies nausea, vomiting, diarrhea, abdominal pain, SOB, chest pain, SAVAGE, palpitations, headache, cough, wheezing, joint pain or swelling, fever, chills.  (18 Mar 2024 23:08)      NEURO HPI:      PAST MEDICAL & SURGICAL HISTORY:  Bezoar  DM Type 2 (Diabetes Mellitus, Type 2)  CAD (Coronary Artery Disease)  Hyperlipidemia  Obstructive Sleep Apnea  Obesity  Soft tissue disorder  Sleep apnea, obstructive on CPAP  Gastroparesis  Stented Coronary Artery, Drug eluting stent ? 2008 or 2012  CABG (Coronary Artery Bypass Graft)  Status Post Implantation of Automatic Cardioverter/Defibrillator (AICD)  History of Cataract Extraction, b/l  S/P trigger finger release, left      MEDICATIONS:  acetaminophen     Tablet .. 650 milliGRAM(s) Oral every 6 hours PRN  aMIOdarone    Tablet 400 milliGRAM(s) Oral daily  atorvastatin 20 milliGRAM(s) Oral at bedtime  brimonidine 0.2% Ophthalmic Solution 1 Drop(s) Both EYES at bedtime  carvedilol 3.125 milliGRAM(s) Oral every 12 hours  cefTRIAXone   IVPB 1000 milliGRAM(s) IV Intermittent every 24 hours  clopidogrel Tablet 75 milliGRAM(s) Oral at bedtime  furosemide    Tablet 40 milliGRAM(s) Oral daily  insulin glargine Injectable (LANTUS) 30 Unit(s) SubCutaneous every morning  insulin lispro (ADMELOG) corrective regimen sliding scale   SubCutaneous three times a day before meals  insulin lispro (ADMELOG) corrective regimen sliding scale   SubCutaneous at bedtime  insulin lispro Injectable (ADMELOG) 3 Unit(s) SubCutaneous three times a day before meals  latanoprost 0.005% Ophthalmic Solution 1 Drop(s) Both EYES at bedtime  levothyroxine 100 MICROGram(s) Oral daily  meclizine 25 milliGRAM(s) Oral three times a day  melatonin 3 milliGRAM(s) Oral at bedtime PRN  ondansetron Injectable 4 milliGRAM(s) IV Push every 8 hours PRN  spironolactone 25 milliGRAM(s) Oral daily  warfarin 2.5 milliGRAM(s) Oral once      ALLERGIES:  No Known Allergies      FAMILY HISTORY:        SOCIAL HISTORY:  Denies alcohol, tobacco, or illicit drug use      REVIEW OF SYSTEMS:  GENERAL: No fever, weight changes, fatigue  EYES: No eye pain or discharge  EAR/NOSE/MOUTH/THROAT: No sinus or throat pain; No difficulty hearing  NECK: No pain or stiffness  RESPIRATORY: No cough, wheezing, chills, or hemoptysis  CARDIOVASCULAR: No chest pain, palpitations, shortness of breath, or dyspnea on exertion  GASTROINTESTINAL: No abdominal pain, nausea, vomiting, hematemesis, diarrhea, or constipation  GENITOURINARY: No dysuria, frequency, hematuria, or incontinence  SKIN: No rashes or lesions  ENDOCRINE: No heat or cold intolerance  HEMATOLOGIC: No easy bruising or bleeding  PSYCHIATRIC: No depression, anxiety, or mood swings  MUSCULOSKELETAL: No joint pain or swelling  NEUROLOGICAL: As per HPI          OBJECTIVE:    Vital Signs Last 24 Hrs  T(C): 36.8 (20 Mar 2024 11:25), Max: 37.2 (19 Mar 2024 23:59)  T(F): 98.2 (20 Mar 2024 11:25), Max: 98.9 (19 Mar 2024 23:59)  HR: 55 (20 Mar 2024 11:25) (55 - 81)  BP: 101/55 (20 Mar 2024 11:25) (100/67 - 130/81)  BP(mean): --  RR: 19 (20 Mar 2024 11:25) (18 - 19)  SpO2: 94% (20 Mar 2024 11:25) (94% - 96%)    Parameters below as of 20 Mar 2024 11:25  Patient On (Oxygen Delivery Method): room air        General Examination:  General: No acute distress  HEENT: Atraumatic, Normocephalic  Respiratory: CTA B/l.  No crackles, rhonchi, or wheezes.  Cardiovascular: RRR.  Normal S1 & S2.  Normal b/l radial and pedal pulses.    Neurological Examination:  General / Mental Status: AAO x 2 (knows year, but not month, date, or day of week).  No aphasia or dysarthria.  Immediate recall: 3/3 ("Apple, Table, Nicki"), 5-minute delayed recall: 0/3 (recalls 1 word with category cue and 1 word with MCQ cue; cannot recall the third word, "Nicki," with either category or MCQ cue).  Cranial Nerves: B/l blink to threat present.  PERRL.  EOMI x 2, No nystagmus or diplopia.  B/l V1-V3 equal and intact to light touch and pinprick.  Symmetric facial movement and palate elevation.  B/l hearing equal to finger rub.  5/5 strength with b/l sternocleidomastoid and trapezius.  Midline tongue protrusion, with no atrophy or fasciculations.  Motor: Normal bulk & tone in all four extremities.  5/5 strength throughout all four extremities.  No downward drift, rigidity, spasticity, or tremors in any of the four extremities.  Sensory: Intact to light touch and pinprick in all four extremities.  Negative Romberg.  Reflex: 2+ and symmetric at b/l biceps, triceps, brachioradialis, patellae, and ankles.  Downgoing toes b/l.  Coordination: No dysmetria with b/l finger-to-nose and heel raise tests.  Symmetric rapid alternating movements b/l.  Gait: Normal, narrow-based gait.  No difficulty with tiptoe, heel, and tandem gaits.        LABORATORY VALUES:                        17.5   5.50  )-----------( 107      ( 20 Mar 2024 06:13 )             50.7       03-20    137  |  107  |  19<H>  ----------------------------<  110<H>  3.7   |  23  |  0.88    Ca    8.8      20 Mar 2024 06:13  Phos  2.3     03-20  Mg     1.7     03-20    TPro  5.8<L>  /  Alb  2.6<L>  /  TBili  1.0  /  DBili  x   /  AST  521<H>  /  ALT  689<H>  /  AlkPhos  80  03-20            NEUROIMAGING:          Please contact the Neurology consult service with any neurological questions.    Jad Milligan MD   of Neurology  [facility] at [facility] NEUROLOGY CONSULT NOTE    NAME:  FARHAT ROMO      ASSESSMENT:  78 RHM with history of mild cognitive impairment with memory loss presenting after a fall and worsening short-term memory, concerning for worsening of mild cognitive impairment vs. subacute necrotizing encephalopathy following recent COVID-19 infection      RECOMMENDATIONS:    - Check Vitamin B12, Folate, and TSH levels, and treat if abnormal    - Follow up on COVID-19 infection status, and provide supportive care as needed    - MRI Brain deferred due to presence of PPM/AICD    - Outpatient followed for neurocognitive evaluation and potential PET-FDG CT Brain for evaluation for cerebral hypometabolism    - DVT ppx: SCDs, Warfarin          *******************************      CHIEF COMPLAINT:  Patient is a 78y old  Male who presents with a chief complaint of UTI, fall (20 Mar 2024 12:27)      HPI:  Pt is a 79 yo M from home, ambulates with cane, with CAD x5 stents on warfarin and Plavix, cardiac arrest in 2014, CHF (EF 30%), PPM/AICD, paroxysmal AF, CKD3, chronic vertigo, DM2, chronic Starr, recurrent UTI, hypothyroid, hx mitral valve clipping, "bladder spot" requiring further o/p workup, presenting s/p fall backwards. Pt is a limited narrator. He says that he has a long history of vertigo, and that he got out of the shower, was at the sink, and got dizzy, lost his balance, when he fell backwards and hit back of his head against shower door. He denies any loc, convulsions, tongue biting, loss of bowel/bladder control. He called his wife for help. He says that he has had a long history of vertigo, says nothing has helped him, but that Epley maneuver is mildly effective. He says he bumps into things a lot because of the vertigo, and that he has bruises on his arms from this. He says that he has had longstanding rhinorrhea. He does also endorse that he tested positive for COVID-19 on 3/8.  Denies nausea, vomiting, diarrhea, abdominal pain, SOB, chest pain, SAVAGE, palpitations, headache, cough, wheezing, joint pain or swelling, fever, chills.  (18 Mar 2024 23:08)      NEURO HPI:  78 RHM who presents after falling backwards and hitting the back of his head against his shower door after falling. He has a known history of mild cognitive impairment with memory loss, and recently had a COVID-19 infection.      PAST MEDICAL & SURGICAL HISTORY:  Bezoar  DM Type 2 (Diabetes Mellitus, Type 2)  CAD (Coronary Artery Disease)  Hyperlipidemia  Obstructive Sleep Apnea  Obesity  Soft tissue disorder  Sleep apnea, obstructive on CPAP  Gastroparesis  Stented Coronary Artery, Drug eluting stent ? 2008 or 2012  CABG (Coronary Artery Bypass Graft)  Status Post Implantation of Automatic Cardioverter/Defibrillator (AICD)  History of Cataract Extraction, b/l  S/P trigger finger release, left      MEDICATIONS:  acetaminophen     Tablet .. 650 milliGRAM(s) Oral every 6 hours PRN  aMIOdarone    Tablet 400 milliGRAM(s) Oral daily  atorvastatin 20 milliGRAM(s) Oral at bedtime  brimonidine 0.2% Ophthalmic Solution 1 Drop(s) Both EYES at bedtime  carvedilol 3.125 milliGRAM(s) Oral every 12 hours  cefTRIAXone   IVPB 1000 milliGRAM(s) IV Intermittent every 24 hours  clopidogrel Tablet 75 milliGRAM(s) Oral at bedtime  furosemide    Tablet 40 milliGRAM(s) Oral daily  insulin glargine Injectable (LANTUS) 30 Unit(s) SubCutaneous every morning  insulin lispro (ADMELOG) corrective regimen sliding scale   SubCutaneous three times a day before meals  insulin lispro (ADMELOG) corrective regimen sliding scale   SubCutaneous at bedtime  insulin lispro Injectable (ADMELOG) 3 Unit(s) SubCutaneous three times a day before meals  latanoprost 0.005% Ophthalmic Solution 1 Drop(s) Both EYES at bedtime  levothyroxine 100 MICROGram(s) Oral daily  meclizine 25 milliGRAM(s) Oral three times a day  melatonin 3 milliGRAM(s) Oral at bedtime PRN  ondansetron Injectable 4 milliGRAM(s) IV Push every 8 hours PRN  spironolactone 25 milliGRAM(s) Oral daily  warfarin 2.5 milliGRAM(s) Oral once      ALLERGIES:  No Known Allergies      FAMILY HISTORY:  No reported family history of dementia      SOCIAL HISTORY:  No reported alcohol, tobacco, or illicit drug use      REVIEW OF SYSTEMS:  GENERAL: No fever, weight changes, fatigue  EYES: No eye pain or discharge  EAR/NOSE/MOUTH/THROAT: No sinus or throat pain; No difficulty hearing  NECK: No pain or stiffness  RESPIRATORY: No cough, wheezing, chills, or hemoptysis  CARDIOVASCULAR: No chest pain, palpitations, shortness of breath, or dyspnea on exertion  GASTROINTESTINAL: No abdominal pain, nausea, vomiting, hematemesis, diarrhea, or constipation  GENITOURINARY: No dysuria, frequency, hematuria, or incontinence  SKIN: No rashes or lesions  ENDOCRINE: No heat or cold intolerance  HEMATOLOGIC: No easy bruising or bleeding  PSYCHIATRIC: No depression, anxiety, or mood swings  MUSCULOSKELETAL: No joint pain or swelling  NEUROLOGICAL: As per HPI          OBJECTIVE:    Vital Signs Last 24 Hrs  T(C): 36.8 (20 Mar 2024 11:25), Max: 37.2 (19 Mar 2024 23:59)  T(F): 98.2 (20 Mar 2024 11:25), Max: 98.9 (19 Mar 2024 23:59)  HR: 55 (20 Mar 2024 11:25) (55 - 81)  BP: 101/55 (20 Mar 2024 11:25) (100/67 - 130/81)  RR: 19 (20 Mar 2024 11:25) (18 - 19)  SpO2: 94% (20 Mar 2024 11:25) (94% - 96%)  Parameters below as of 20 Mar 2024 11:25  Patient On (Oxygen Delivery Method): room air        General Examination:  General: No acute distress  HEENT: Atraumatic, Normocephalic  Respiratory: CTA B/l.  No crackles, rhonchi, or wheezes.  Cardiovascular: Low heart rate, Regular rhythm.  Normal b/l radial and pedal pulses.    Neurological Examination:  General / Mental Status: AAO x 2 (knows year, but not month, date, or day of week).  No aphasia or dysarthria.  Immediate recall: 3/3 ("Apple, Table, Nicki"), 5-minute delayed recall: 0/3 (recalls 1 word with category cue and 1 word with MCQ cue; cannot recall the third word, "Nicki," with either category or MCQ cue).  Cranial Nerves: B/l blink to threat present.  PERRL.  EOMI x 2, No nystagmus or diplopia.  B/l V1-V3 equal and intact to light touch and pinprick.  Symmetric facial movement and palate elevation.  B/l hearing equal to finger rub.  At least 4/5 strength with b/l sternocleidomastoid and trapezius.  Midline tongue protrusion, with no atrophy or fasciculations.  Motor: Normal bulk & tone in all four extremities.  At least 4/5 strength throughout all four extremities.  No downward drift, rigidity, spasticity, or tremors in any of the four extremities.  Sensory: Intact to light touch and pinprick in all four extremities.  Reflex: 2+ and symmetric at b/l biceps and patellae.  1+ and symmetric at b/l triceps, brachioradialis, and ankles.  Downgoing toes b/l.  Coordination: No dysmetria with b/l finger-to-nose and heel raise tests.  Gait and Romberg sign testing deferred per patient preference.          LABORATORY VALUES:                        17.5   5.50  )-----------( 107      ( 20 Mar 2024 06:13 )             50.7       03-20    137  |  107  |  19<H>  ----------------------------<  110<H>  3.7   |  23  |  0.88    Ca    8.8      20 Mar 2024 06:13  Phos  2.3     03-20  Mg     1.7     03-20    TPro  5.8<L>  /  Alb  2.6<L>  /  TBili  1.0  /  DBili  x   /  AST  521<H>  /  ALT  689<H>  /  AlkPhos  80  03-20          NEUROIMAGING:      CT Head & CT Cervical Spine (3/18/24):  - No acute intracranial abnormality  - Diffuse cerebral atrophy  - Chronic multilevel cervical spine degenerative changes without fracture-           Please contact the Neurology consult service with any neurological questions.    Jad Milligan MD   of Neurology  [facility] at [facility] NEUROLOGY CONSULT NOTE    NAME:  FARHAT ROMO      ASSESSMENT:  78 RHM with history of mild cognitive impairment with memory loss presenting after a fall and worsening short-term memory, concerning for worsening of mild cognitive impairment vs. subacute necrotizing encephalopathy following recent COVID-19 infection      RECOMMENDATIONS:    - Check Vitamin B12, Folate, and TSH levels, and treat if abnormal    - Follow up on COVID-19 infection status, and provide supportive care as needed    - MRI Brain deferred due to presence of PPM/AICD    - Outpatient followed for neurocognitive evaluation and potential PET-FDG CT Brain for evaluation for cerebral hypometabolism    - DVT ppx: SCDs, Warfarin          *******************************      CHIEF COMPLAINT:  Patient is a 78y old  Male who presents with a chief complaint of UTI, fall (20 Mar 2024 12:27)      HPI:  Pt is a 77 yo M from home, ambulates with cane, with CAD x5 stents on warfarin and Plavix, cardiac arrest in 2014, CHF (EF 30%), PPM/AICD, paroxysmal AF, CKD3, chronic vertigo, DM2, chronic Starr, recurrent UTI, hypothyroid, hx mitral valve clipping, "bladder spot" requiring further o/p workup, presenting s/p fall backwards. Pt is a limited narrator. He says that he has a long history of vertigo, and that he got out of the shower, was at the sink, and got dizzy, lost his balance, when he fell backwards and hit back of his head against shower door. He denies any loc, convulsions, tongue biting, loss of bowel/bladder control. He called his wife for help. He says that he has had a long history of vertigo, says nothing has helped him, but that Epley maneuver is mildly effective. He says he bumps into things a lot because of the vertigo, and that he has bruises on his arms from this. He says that he has had longstanding rhinorrhea. He does also endorse that he tested positive for COVID-19 on 3/8.  Denies nausea, vomiting, diarrhea, abdominal pain, SOB, chest pain, SAVAGE, palpitations, headache, cough, wheezing, joint pain or swelling, fever, chills.  (18 Mar 2024 23:08)      NEURO HPI:  78 RHM who presents after falling backwards and hitting the back of his head against his shower door after falling. He has a known history of mild cognitive impairment with memory loss, and recently had a COVID-19 infection.      PAST MEDICAL & SURGICAL HISTORY:  Bezoar  DM Type 2 (Diabetes Mellitus, Type 2)  CAD (Coronary Artery Disease)  Hyperlipidemia  Obstructive Sleep Apnea  Obesity  Soft tissue disorder  Sleep apnea, obstructive on CPAP  Gastroparesis  Stented Coronary Artery, Drug eluting stent ? 2008 or 2012  CABG (Coronary Artery Bypass Graft)  Status Post Implantation of Automatic Cardioverter/Defibrillator (AICD)  History of Cataract Extraction, b/l  S/P trigger finger release, left      MEDICATIONS:  acetaminophen     Tablet .. 650 milliGRAM(s) Oral every 6 hours PRN  aMIOdarone    Tablet 400 milliGRAM(s) Oral daily  atorvastatin 20 milliGRAM(s) Oral at bedtime  brimonidine 0.2% Ophthalmic Solution 1 Drop(s) Both EYES at bedtime  carvedilol 3.125 milliGRAM(s) Oral every 12 hours  cefTRIAXone   IVPB 1000 milliGRAM(s) IV Intermittent every 24 hours  clopidogrel Tablet 75 milliGRAM(s) Oral at bedtime  furosemide    Tablet 40 milliGRAM(s) Oral daily  insulin glargine Injectable (LANTUS) 30 Unit(s) SubCutaneous every morning  insulin lispro (ADMELOG) corrective regimen sliding scale   SubCutaneous three times a day before meals  insulin lispro (ADMELOG) corrective regimen sliding scale   SubCutaneous at bedtime  insulin lispro Injectable (ADMELOG) 3 Unit(s) SubCutaneous three times a day before meals  latanoprost 0.005% Ophthalmic Solution 1 Drop(s) Both EYES at bedtime  levothyroxine 100 MICROGram(s) Oral daily  meclizine 25 milliGRAM(s) Oral three times a day  melatonin 3 milliGRAM(s) Oral at bedtime PRN  ondansetron Injectable 4 milliGRAM(s) IV Push every 8 hours PRN  spironolactone 25 milliGRAM(s) Oral daily  warfarin 2.5 milliGRAM(s) Oral once      ALLERGIES:  No Known Allergies      FAMILY HISTORY:  No reported family history of dementia      SOCIAL HISTORY:  No reported alcohol, tobacco, or illicit drug use      REVIEW OF SYSTEMS:  GENERAL: No fever, weight changes, fatigue  EYES: No eye pain or discharge  EAR/NOSE/MOUTH/THROAT: No sinus or throat pain; No difficulty hearing  NECK: No pain or stiffness  RESPIRATORY: No cough, wheezing, chills, or hemoptysis  CARDIOVASCULAR: No chest pain, palpitations, shortness of breath, or dyspnea on exertion  GASTROINTESTINAL: No abdominal pain, nausea, vomiting, hematemesis, diarrhea, or constipation  GENITOURINARY: No dysuria, frequency, hematuria, or incontinence  SKIN: No rashes or lesions  ENDOCRINE: No heat or cold intolerance  HEMATOLOGIC: No easy bruising or bleeding  PSYCHIATRIC: No depression, anxiety, or mood swings  MUSCULOSKELETAL: No joint pain or swelling  NEUROLOGICAL: As per HPI          OBJECTIVE:    Vital Signs Last 24 Hrs  T(C): 36.8 (20 Mar 2024 11:25), Max: 37.2 (19 Mar 2024 23:59)  T(F): 98.2 (20 Mar 2024 11:25), Max: 98.9 (19 Mar 2024 23:59)  HR: 55 (20 Mar 2024 11:25) (55 - 81)  BP: 101/55 (20 Mar 2024 11:25) (100/67 - 130/81)  RR: 19 (20 Mar 2024 11:25) (18 - 19)  SpO2: 94% (20 Mar 2024 11:25) (94% - 96%)  Parameters below as of 20 Mar 2024 11:25  Patient On (Oxygen Delivery Method): room air        General Examination:  General: No acute distress  HEENT: Atraumatic, Normocephalic  Respiratory: CTA B/l.  No crackles, rhonchi, or wheezes.  Cardiovascular: Low heart rate, Regular rhythm.  Normal b/l radial and pedal pulses.    Neurological Examination:  General / Mental Status: AAO x 2 (knows year, but not month, date, or day of week).  No aphasia or dysarthria.  Immediate recall: 3/3 ("Apple, Table, Nicki"), 5-minute delayed recall: 0/3 (recalls 1 word with category cue and 1 word with MCQ cue; cannot recall the third word, "Nicki," with either category or MCQ cue).  Cranial Nerves: B/l blink to threat present.  PERRL.  EOMI x 2, No nystagmus or diplopia.  B/l V1-V3 equal and intact to light touch and pinprick.  Symmetric facial movement and palate elevation.  B/l hearing equal to finger rub.  At least 4/5 strength with b/l sternocleidomastoid and trapezius.  Midline tongue protrusion, with no atrophy or fasciculations.  Motor: Normal bulk & tone in all four extremities.  At least 4/5 strength throughout all four extremities.  No downward drift, rigidity, spasticity, or tremors in any of the four extremities.  Sensory: Intact to light touch and pinprick in all four extremities.  Reflex: 2+ and symmetric at b/l biceps and patellae.  1+ and symmetric at b/l triceps, brachioradialis, and ankles.  Downgoing toes b/l.  Coordination: No dysmetria with b/l finger-to-nose and heel raise tests.  Gait and Romberg sign testing deferred per patient preference.          LABORATORY VALUES:                        17.5   5.50  )-----------( 107      ( 20 Mar 2024 06:13 )             50.7       03-20    137  |  107  |  19<H>  ----------------------------<  110<H>  3.7   |  23  |  0.88    Ca    8.8      20 Mar 2024 06:13  Phos  2.3     03-20  Mg     1.7     03-20    TPro  5.8<L>  /  Alb  2.6<L>  /  TBili  1.0  /  DBili  x   /  AST  521<H>  /  ALT  689<H>  /  AlkPhos  80  03-20          NEUROIMAGING:      CT Head & CT Cervical Spine (3/18/24):  - No acute intracranial abnormality  - Diffuse cerebral atrophy  - Chronic multilevel cervical spine degenerative changes without fracture          Please contact the Neurology consult service with any neurological questions.    Jad Milligan MD   of Neurology  [facility] at [facility]

## 2024-03-20 NOTE — PROGRESS NOTE ADULT - PROBLEM SELECTOR PLAN 1
01-Nov-2018 As per o/p notes in [facility] from Dr. Vieira, pt has rapid deterioration in overall neuro function with increased weakness, and apparently he has had decreasing motor skills and cannot dress himself anymore.  -presenting s/p fall while shaving with headstrike on 3/17, likely 2/2 vertigo  -CTH neg for acute pathology  -f/u TTE  -Neurology consulted, Dr. Milligan  -PT consult

## 2024-03-20 NOTE — ADVANCED PRACTICE NURSE CONSULT - RECOMMEDATIONS
-Clean the Coccyx and R. Gluteal wounds with normal saline and apply skin prep to the surrounding skin  -Please order Iodosorb ointment from the Pharmacy  -Apply Iodosorb to the wound bed and cover with a Foam dressing Q 48hrs PRN  -Elevate/float the patients heels using heel protectors and reposition the patient Q 2hrs using wedges or pillows

## 2024-03-20 NOTE — PROGRESS NOTE ADULT - PROBLEM SELECTOR PLAN 6
hx of HFrEF with last EF 30% as per o/p documentation  -currently euvolemic appearing on exam not in exacerbation  -cont home lasix, spironolactone, coreg

## 2024-03-20 NOTE — PROGRESS NOTE ADULT - PROBLEM SELECTOR PLAN 4
p/w hbg 17.3, Hct 50.8, normal MCV  -repeat hbg downtrend to 15.9 then back up to 17.5  -oddly iron deficient on labs with total iron 24, % saturation 11    -f/u serum EPO and ferritin  -possibly 2ndry PCV in s/o undiagnosed LUCIA

## 2024-03-20 NOTE — PROGRESS NOTE ADULT - PROBLEM SELECTOR PLAN 3
p/w ,  significantly elevated from baseline  -CT abdomen shows hepatic cirrhosis morphology  -ASA/acetaminophen levels normal  -Hepatology Dr. Lazcano consulted  -f/u Hep A IgM, HBsAg, HBcAb, HBsAb, HBcAb IgM, HCV RNA, Hep E IgM/PCR, CHASTITY, SMA, Ig panel, ferritin, iron/TIBC, salicylate level, Utox, Peth, EBV, CMV, HSV PCRs  -LFTs downtrending AST//689 today

## 2024-03-21 ENCOUNTER — RESULT REVIEW (OUTPATIENT)
Age: 79
End: 2024-03-21

## 2024-03-21 DIAGNOSIS — E78.5 HYPERLIPIDEMIA, UNSPECIFIED: ICD-10-CM

## 2024-03-21 LAB
ALBUMIN SERPL ELPH-MCNC: 2.7 G/DL — LOW (ref 3.5–5)
ALP SERPL-CCNC: 87 U/L — SIGNIFICANT CHANGE UP (ref 40–120)
ALT FLD-CCNC: 635 U/L DA — HIGH (ref 10–60)
ANION GAP SERPL CALC-SCNC: 3 MMOL/L — LOW (ref 5–17)
APTT BLD: 40.7 SEC — HIGH (ref 24.5–35.6)
AST SERPL-CCNC: 439 U/L — HIGH (ref 10–40)
BASOPHILS # BLD AUTO: 0.02 K/UL — SIGNIFICANT CHANGE UP (ref 0–0.2)
BASOPHILS NFR BLD AUTO: 0.3 % — SIGNIFICANT CHANGE UP (ref 0–2)
BILIRUB DIRECT SERPL-MCNC: 0.6 MG/DL — HIGH (ref 0–0.3)
BILIRUB INDIRECT FLD-MCNC: 0.4 MG/DL — SIGNIFICANT CHANGE UP (ref 0.2–1)
BILIRUB SERPL-MCNC: 1 MG/DL — SIGNIFICANT CHANGE UP (ref 0.2–1.2)
BUN SERPL-MCNC: 23 MG/DL — HIGH (ref 7–18)
CALCIUM SERPL-MCNC: 8.6 MG/DL — SIGNIFICANT CHANGE UP (ref 8.4–10.5)
CHLORIDE SERPL-SCNC: 107 MMOL/L — SIGNIFICANT CHANGE UP (ref 96–108)
CMV DNA CSF QL NAA+PROBE: SIGNIFICANT CHANGE UP IU/ML
CMV DNA SPEC NAA+PROBE-LOG#: SIGNIFICANT CHANGE UP LOG10IU/ML
CO2 SERPL-SCNC: 29 MMOL/L — SIGNIFICANT CHANGE UP (ref 22–31)
CREAT SERPL-MCNC: 0.96 MG/DL — SIGNIFICANT CHANGE UP (ref 0.5–1.3)
EBV DNA SERPL NAA+PROBE-ACNC: ABNORMAL IU/ML
EBVPCR LOG: ABNORMAL LOG10IU/ML
EGFR: 81 ML/MIN/1.73M2 — SIGNIFICANT CHANGE UP
EOSINOPHIL # BLD AUTO: 0.03 K/UL — SIGNIFICANT CHANGE UP (ref 0–0.5)
EOSINOPHIL NFR BLD AUTO: 0.5 % — SIGNIFICANT CHANGE UP (ref 0–6)
FERRITIN SERPL-MCNC: 1594 NG/ML — HIGH (ref 30–400)
FOLATE SERPL-MCNC: 15.5 NG/ML — SIGNIFICANT CHANGE UP
GLUCOSE BLDC GLUCOMTR-MCNC: 121 MG/DL — HIGH (ref 70–99)
GLUCOSE BLDC GLUCOMTR-MCNC: 122 MG/DL — HIGH (ref 70–99)
GLUCOSE BLDC GLUCOMTR-MCNC: 150 MG/DL — HIGH (ref 70–99)
GLUCOSE BLDC GLUCOMTR-MCNC: 86 MG/DL — SIGNIFICANT CHANGE UP (ref 70–99)
GLUCOSE SERPL-MCNC: 89 MG/DL — SIGNIFICANT CHANGE UP (ref 70–99)
HCT VFR BLD CALC: 52.3 % — HIGH (ref 39–50)
HCV RNA FLD QL NAA+PROBE: SIGNIFICANT CHANGE UP
HCV RNA SPEC QL PROBE+SIG AMP: SIGNIFICANT CHANGE UP
HGB BLD-MCNC: 17.7 G/DL — HIGH (ref 13–17)
IMM GRANULOCYTES NFR BLD AUTO: 0.8 % — SIGNIFICANT CHANGE UP (ref 0–0.9)
INR BLD: 2.31 RATIO — HIGH (ref 0.85–1.18)
LYMPHOCYTES # BLD AUTO: 1.37 K/UL — SIGNIFICANT CHANGE UP (ref 1–3.3)
LYMPHOCYTES # BLD AUTO: 23.2 % — SIGNIFICANT CHANGE UP (ref 13–44)
MAGNESIUM SERPL-MCNC: 1.7 MG/DL — SIGNIFICANT CHANGE UP (ref 1.6–2.6)
MCHC RBC-ENTMCNC: 32.4 PG — SIGNIFICANT CHANGE UP (ref 27–34)
MCHC RBC-ENTMCNC: 33.8 GM/DL — SIGNIFICANT CHANGE UP (ref 32–36)
MCV RBC AUTO: 95.6 FL — SIGNIFICANT CHANGE UP (ref 80–100)
MONOCYTES # BLD AUTO: 0.67 K/UL — SIGNIFICANT CHANGE UP (ref 0–0.9)
MONOCYTES NFR BLD AUTO: 11.4 % — SIGNIFICANT CHANGE UP (ref 2–14)
NEUTROPHILS # BLD AUTO: 3.76 K/UL — SIGNIFICANT CHANGE UP (ref 1.8–7.4)
NEUTROPHILS NFR BLD AUTO: 63.8 % — SIGNIFICANT CHANGE UP (ref 43–77)
NRBC # BLD: 0 /100 WBCS — SIGNIFICANT CHANGE UP (ref 0–0)
PHOSPHATE SERPL-MCNC: 2.7 MG/DL — SIGNIFICANT CHANGE UP (ref 2.5–4.5)
PLATELET # BLD AUTO: 114 K/UL — LOW (ref 150–400)
POTASSIUM SERPL-MCNC: 3.5 MMOL/L — SIGNIFICANT CHANGE UP (ref 3.5–5.3)
POTASSIUM SERPL-SCNC: 3.5 MMOL/L — SIGNIFICANT CHANGE UP (ref 3.5–5.3)
PROT SERPL-MCNC: 6.2 G/DL — SIGNIFICANT CHANGE UP (ref 6–8.3)
PROTHROM AB SERPL-ACNC: 25.7 SEC — HIGH (ref 9.5–13)
RBC # BLD: 5.47 M/UL — SIGNIFICANT CHANGE UP (ref 4.2–5.8)
RBC # FLD: 14.8 % — HIGH (ref 10.3–14.5)
SODIUM SERPL-SCNC: 139 MMOL/L — SIGNIFICANT CHANGE UP (ref 135–145)
TSH SERPL-MCNC: 0.74 UU/ML — SIGNIFICANT CHANGE UP (ref 0.34–4.82)
VIT B12 SERPL-MCNC: 1814 PG/ML — HIGH (ref 232–1245)
WBC # BLD: 5.9 K/UL — SIGNIFICANT CHANGE UP (ref 3.8–10.5)
WBC # FLD AUTO: 5.9 K/UL — SIGNIFICANT CHANGE UP (ref 3.8–10.5)

## 2024-03-21 PROCEDURE — 99223 1ST HOSP IP/OBS HIGH 75: CPT | Mod: FS

## 2024-03-21 PROCEDURE — 93289 INTERROG DEVICE EVAL HEART: CPT | Mod: 26

## 2024-03-21 PROCEDURE — 99233 SBSQ HOSP IP/OBS HIGH 50: CPT | Mod: GC

## 2024-03-21 RX ORDER — WARFARIN SODIUM 2.5 MG/1
2.5 TABLET ORAL ONCE
Refills: 0 | Status: DISCONTINUED | OUTPATIENT
Start: 2024-03-21 | End: 2024-03-21

## 2024-03-21 RX ORDER — WARFARIN SODIUM 2.5 MG/1
2.5 TABLET ORAL ONCE
Refills: 0 | Status: COMPLETED | OUTPATIENT
Start: 2024-03-21 | End: 2024-03-21

## 2024-03-21 RX ORDER — INSULIN GLARGINE 100 [IU]/ML
12 INJECTION, SOLUTION SUBCUTANEOUS EVERY MORNING
Refills: 0 | Status: DISCONTINUED | OUTPATIENT
Start: 2024-03-21 | End: 2024-03-26

## 2024-03-21 RX ADMIN — WARFARIN SODIUM 2.5 MILLIGRAM(S): 2.5 TABLET ORAL at 21:37

## 2024-03-21 RX ADMIN — Medication 25 MILLIGRAM(S): at 21:37

## 2024-03-21 RX ADMIN — Medication 3 UNIT(S): at 11:57

## 2024-03-21 RX ADMIN — AMIODARONE HYDROCHLORIDE 400 MILLIGRAM(S): 400 TABLET ORAL at 06:33

## 2024-03-21 RX ADMIN — Medication 25 MILLIGRAM(S): at 06:32

## 2024-03-21 RX ADMIN — Medication 100 MICROGRAM(S): at 06:32

## 2024-03-21 RX ADMIN — CEFTRIAXONE 100 MILLIGRAM(S): 500 INJECTION, POWDER, FOR SOLUTION INTRAMUSCULAR; INTRAVENOUS at 06:33

## 2024-03-21 RX ADMIN — LATANOPROST 1 DROP(S): 0.05 SOLUTION/ DROPS OPHTHALMIC; TOPICAL at 21:37

## 2024-03-21 RX ADMIN — Medication 40 MILLIGRAM(S): at 06:32

## 2024-03-21 RX ADMIN — SPIRONOLACTONE 25 MILLIGRAM(S): 25 TABLET, FILM COATED ORAL at 06:32

## 2024-03-21 RX ADMIN — Medication 100 MILLIGRAM(S): at 21:37

## 2024-03-21 RX ADMIN — CLOPIDOGREL BISULFATE 75 MILLIGRAM(S): 75 TABLET, FILM COATED ORAL at 21:37

## 2024-03-21 RX ADMIN — BRIMONIDINE TARTRATE 1 DROP(S): 2 SOLUTION/ DROPS OPHTHALMIC at 21:38

## 2024-03-21 RX ADMIN — Medication 25 MILLIGRAM(S): at 14:37

## 2024-03-21 RX ADMIN — Medication 100 MILLIGRAM(S): at 11:56

## 2024-03-21 NOTE — CONSULT NOTE ADULT - SUBJECTIVE AND OBJECTIVE BOX
CHIEF COMPLAINT: s/p fall    HPI: Pt is a 77 yo M from home, ambulates with cane, with CAD x5 stents, CABG, on warfarin and plavix, cardiac arrest in 2014, CHF (EF 30%), PPM/AICD, paroxysmal AF, CKD3, chronic vertigo, DM2, chronic castellaon, recurrent UTI, hypothyroid, hx mitral valve clipping, "bladder spot" requiring further o/p workup, presenting s/p fall backwards. Pt is a limited narrator. He says that he has a long history of vertigo, and that he got out of the shower, was at the sink, and got dizzy, lost his balance, when he fell backwards and hit back of his head against shower door. He denies any loc, convulsions, tongue biting, loss of bowel/bladder control. He called his wife for help. He says that he has had a long history of vertigo, says nothing has helped him, but that Epley maneuver is mildly effective. He says he bumps into things a lot because of the vertigo, and that he has bruises on his arms from this. Patient also endorses that he tested pos for Covid on 3/8. Patient denies chest pain, plapitations, syncope, shortness of breath, LE edema, PND/orthopnea.     PAST MEDICAL & SURGICAL HISTORY:  Bezoar  DM Type 2 (Diabetes Mellitus, Type 2)  CAD (Coronary Artery Disease)  Hyperlipidemia  Obstructive Sleep Apnea  Obesity  Soft tissue disorder  Sleep apnea, obstructive  on CPAP  Gastroparesis  Stented Coronary Artery  Drug eluting stent ? 2008 or 2012  CABG (Coronary Artery Bypass Graft)  Status Post Implantation of Automatic Cardioverter/Defibrillator (AICD)  History of Cataract Extraction  b/l  S/P trigger finger release  left    Allergies    No Known Allergies    Intolerances        MEDICATIONS  (STANDING):  aMIOdarone    Tablet 400 milliGRAM(s) Oral daily  brimonidine 0.2% Ophthalmic Solution 1 Drop(s) Both EYES at bedtime  carvedilol 3.125 milliGRAM(s) Oral every 12 hours  clopidogrel Tablet 75 milliGRAM(s) Oral at bedtime  furosemide    Tablet 40 milliGRAM(s) Oral daily  insulin glargine Injectable (LANTUS) 30 Unit(s) SubCutaneous every morning  insulin lispro (ADMELOG) corrective regimen sliding scale   SubCutaneous three times a day before meals  insulin lispro (ADMELOG) corrective regimen sliding scale   SubCutaneous at bedtime  insulin lispro Injectable (ADMELOG) 3 Unit(s) SubCutaneous three times a day before meals  latanoprost 0.005% Ophthalmic Solution 1 Drop(s) Both EYES at bedtime  levothyroxine 100 MICROGram(s) Oral daily  meclizine 25 milliGRAM(s) Oral three times a day  spironolactone 25 milliGRAM(s) Oral daily    MEDICATIONS  (PRN):  acetaminophen     Tablet .. 650 milliGRAM(s) Oral every 6 hours PRN Temp greater or equal to 38C (100.4F), Mild Pain (1 - 3)  benzonatate 100 milliGRAM(s) Oral three times a day PRN Cough  guaiFENesin Oral Liquid (Sugar-Free) 100 milliGRAM(s) Oral every 6 hours PRN Cough  melatonin 3 milliGRAM(s) Oral at bedtime PRN Insomnia  ondansetron Injectable 4 milliGRAM(s) IV Push every 8 hours PRN Nausea and/or Vomiting      FAMILY HISTORY:      ***No family history of premature coronary artery disease or sudden cardiac death    SOCIAL HISTORY:  Smoking- former smoker, 2ppd for 10 years, quit 1970s  Alcohol-denies  Illicit Drug use-denies    REVIEW OF SYSTEMS:  Constitutional: [ ] fever, [ ]weight loss,  [ ]fatigue  Eyes: [ ] visual changes  Respiratory: [ ]shortness of breath;  [ ] cough, [ ]wheezing, [ ]chills, [ ]hemoptysis  Cardiovascular: [ ] chest pain, [ ]palpitations, [ x]dizziness,  [ ]leg swelling [ ]syncope  Gastrointestinal: [ ] abdominal pain, [ ]nausea, [ ]vomiting,  [ ]diarrhea   Genitourinary: [ ] dysuria, [ ] hematuria  Neurologic: [ ] headaches [ ] tremors  [x ] weakness [ ] lightheadedness  Skin: [ ] itching, [ ]burning, [ ] rashes  Endocrine: [ ] heat or cold intolerance  Musculoskeletal: [ ] joint pain or swelling; [ ] muscle, back, or extremity pain  Psychiatric: [ ] depression, [ ]anxiety, [ ]mood swings, or [ ]difficulty sleeping  Hematologic: [ ] easy bruising, [ ] bleeding gums     [ x] All others negative	  [ ] Unable to obtain    Vital Signs Last 24 Hrs  T(C): 36.3 (21 Mar 2024 11:20), Max: 37 (20 Mar 2024 23:48)  T(F): 97.4 (21 Mar 2024 11:20), Max: 98.6 (20 Mar 2024 23:48)  HR: 52 (21 Mar 2024 11:20) (52 - 60)  BP: 107/60 (21 Mar 2024 11:20) (96/64 - 147/72)  BP(mean): 74 (21 Mar 2024 08:37) (74 - 74)  RR: 18 (21 Mar 2024 12:02) (17 - 18)  SpO2: 93% (21 Mar 2024 12:02) (91% - 96%)    Parameters below as of 21 Mar 2024 12:02  Patient On (Oxygen Delivery Method): nasal cannula  O2 Flow (L/min): 2    I&O's Summary    20 Mar 2024 07:01  -  21 Mar 2024 07:00  --------------------------------------------------------  IN: 0 mL / OUT: 1000 mL / NET: -1000 mL        PHYSICAL EXAM:  General: No acute distress  HEENT: EOMI  Neck:  No JVD  Lungs: Clear to auscultation bilaterally; No rales or wheezing  Heart: Regular rate and rhythm; No murmurs, rubs, or gallops  Abdomen: soft, non tender, non distended   Extremities: warm, no edema   Nervous system:  Alert & Oriented X3  Psychiatric: Normal affect  Skin: No rashes or lesions    LABS:  03-21    139  |  107  |  23<H>  ----------------------------<  89  3.5   |  29  |  0.96    Ca    8.6      21 Mar 2024 05:04  Phos  2.7     03-21  Mg     1.7     03-21    TPro  6.2  /  Alb  2.7<L>  /  TBili  1.0  /  DBili  0.6<H>  /  AST  439<H>  /  ALT  635<H>  /  AlkPhos  87  03-21    Creatinine Trend: 0.96<--, 0.88<--, 1.11<--, 1.73<--                        17.7   5.90  )-----------( 114      ( 21 Mar 2024 05:04 )             52.3     PT/INR - ( 21 Mar 2024 05:04 )   PT: 25.7 sec;   INR: 2.31 ratio         PTT - ( 21 Mar 2024 05:04 )  PTT:40.7 sec    Lipid Panel:   Cardiac Enzymes:           RADIOLOGY: < from: CT Abdomen and Pelvis w/ IV Cont (03.18.24 @ 20:33) >  IMPRESSION:  1. Multifocal groundglass opacities which are nonspecific but may be   related to known COVID infection.  2.  Hepatic cirrhosis.  3.  Markedly abnormal thick walled bladder with Castellano catheter in place.   Cystoscopy recommended.          --- End of Report ---    < end of copied text >  < from: CT Head No Cont (03.18.24 @ 20:33) >  BRAIN  IMPRESSION:    1)  involutional change and volume loss, commensurate with age. No acute   abnormality suggested.  2)  no intracerebral hemorrhage, contusion, or extracerebral hemorrhagic   collections identified.    CERVICAL IMPRESSION:    Chronic degenerative changes C3-C7 with central and foraminal narrowing   at these levels. No acute fracture identified.    --- End of Report ---            LISBETH NORTH MD; Attending Radiologist  This document has been electronically signed. Mar 18 2024  8:43PM    < end of copied text >  < from: CT Chest w/ IV Cont (03.18.24 @ 20:32) >    IMPRESSION:  1. Multifocal groundglass opacities which are nonspecific but may be   related to known COVID infection.  2.  Hepatic cirrhosis.  3.  Markedly abnormal thick walled bladder with Castellano catheter in place.   Cystoscopy recommended.          --- End of Report ---        < end of copied text >  < from: CT Cervical Spine No Cont (03.18.24 @ 20:32) >    BRAIN  IMPRESSION:    1)  involutional change and volume loss, commensurate with age. No acute   abnormality suggested.  2)  no intracerebral hemorrhage, contusion, or extracerebral hemorrhagic   collections identified.    CERVICAL IMPRESSION:    Chronic degenerative changes C3-C7 with central and foraminal narrowing   at these levels. No acute fracture identified.    --- End of Report ---    < end of copied text >      ECG:< from: 12 Lead ECG (03.18.24 @ 18:30) >  Diagnosis Line Normal sinus rhythm  Left axis deviation  Non-specific intra-ventricular conduction block  Inferior infarct , age undetermined  Abnormal ECG    Confirmed by KRISTIN GONZALEZ [facility] ([id number]) on 3/20/2024 1:02:24 PM    < end of copied text >      TELEMETRY: NSR HR range 49-65      STRESS TEST: From outpatient documents from [facility]: 3/30/2023  Overall impression: Abnormal.  Indeterminate ECG evidence ischemia after IV regadenoson due to the presence of paced beats.  SPECT results are abnormal.  There is a large size defect, of moderate to severe severity, in the basal inferior, basal inferoseptal,  mid inferior, mid inferoseptal, apical inferior and apical wall(s), that is fixed, suggestive of infarction.  There is a small to moderate size defect, of mild to moderate severity, in the mid anteroseptal, apical  septal and apical wall(s), that is reversible, suggestive of ischemia.  Wall motion was abnormal. there was diffuse global hypokinesis.  Post stress analysis showed moderately reduced left ventricular function with moderately enlarged  left ventricle size.  Post stress resting myocardial perfusion gated SPECT imaging was performed, showing a left  ventricular ejection fraction of 29 %,  There is a prior study available for comparison that was performed on 3/28/2022.  Compared to the prior study, the previously seen inferior defect is unchanged. The  anteroseptal/apical ischemia is new.

## 2024-03-21 NOTE — CONSULT NOTE ADULT - PROBLEM SELECTOR RECOMMENDATION 5
-currently in NSR  -continue Coumadin  -will hold Amiodarone due to hepatotoxic effects  -patient to follow up with outpt. Cardiologist for when to resume amiodarone

## 2024-03-21 NOTE — PHYSICAL THERAPY INITIAL EVALUATION ADULT - ADDITIONAL COMMENTS
-- Pt poor historian. History obtained through interview & H&P document (18-Mar-2024)  -- Uses SAC primarily in his Rt hand (but claims to be ambidextrous)  -- Denies shower chair or commode.   -- Pt denies having HHA

## 2024-03-21 NOTE — PHYSICAL THERAPY INITIAL EVALUATION ADULT - DIAGNOSIS, PT EVAL
(ICF Model) Pt. present w/deficits in Body Structures/Function (Impairments), incl: Strength, Balance, Cognition, & Coordination, leading to deficits in performing the below noted Activities (Limitations).

## 2024-03-21 NOTE — PHYSICAL THERAPY INITIAL EVALUATION ADULT - LIVES WITH, PROFILE
wife in a private home. No stairs to access (via garage door, several steps to enter through front door), staricase inside to reach 2nd floor, however pt has electric chair lift./spouse

## 2024-03-21 NOTE — CONSULT NOTE ADULT - PROBLEM SELECTOR RECOMMENDATION 9
-less likely due to bradycardia  -ICD interrogation did not reveal any arrythmia. Patient is not ppm dependant (pacing<1%). Base rate set up to 40 bpm.  - -less likely due to bradycardia  -ICD interrogation did not reveal any arrythmia. Patient is not ppm dependant (pacing<1%). Base rate set up to 40 bpm.

## 2024-03-21 NOTE — PROGRESS NOTE ADULT - ASSESSMENT
78y Male w/ Hx of Type 2 DM, HTN, HLD, hypothyroidism, obesity, CAD s/p stent x5 and CABG, c/p cardiac arrest (2014), CHF (LVEF 30%), s/p PPM/AICD, pAfib, mitral valve "clipping", CKD stage 3, recurrent UTIs, indwelling Starr, LUCIA, gastroparesis, abnormal bladder imaging (pending biopsy) and vertigo presented to [facility] ER on 3/18/24 s/p fall, dizziness, w/o LOC, but hitting his head.  On admission he was found to have severe hepatocellular liver enzyme elevation for what hepatology was consulted. Noted to be COVID-19 pos which is reportedly since 3/8 (?).     # Severe hepatocellular liver enzyme elevation w/ normal cholestatic parameters  - Differential includes ischemic vs. infectious vs. drug induced vs. autoimmune vs. other.  - Cannot rule out that he had undocumented hypotension during his fall at  home  - COVID-19 known to be a/w abnormal liver enzymes as well, although usually lower levels.  - AIH triggered by COVID-19 has been reported in few cases  - Denies pain medication use, but given his memory problems, would need to confirm w/ family if he took any OTC medications recently  - Liver w/u so far: BAL neg, Acetaminophen level neg. HCV ab neg. HSV PCR neg.     Rec:  - Continue holding atorvastatin and avoid re-challenge w/ same statin in the future  - Cardiology consult appreciated, holding amiodarone.   - Liver workup so far: Hep A IgM neg, HBsAg neg, HBsAB pos, HCV ab neg/ RNA neg. EBV PCR detectable but level below detection level, likely non specific. CMV PCR neg. HSV 1/2 PCR neg. CHASTITY neg.  - Please, obtain / follow up CPK, HBcAb, Hep E IgM/PCR, SMA, LKM, AMA, Ig panel, Peth, VZV PCRs.   - C/w supportive measures for COVID-19 per primary team  - Will need repeat of ferritin and iron studies later      # Cirrhosis?  - MELD 3.0 - not accurate b/o coumadin  - CPT - not accurate b/o coumadin, but even with that would be max. CPT B  - Cirrhotic appearing liver on imaging and reports Hx of fatty liver. He has metabolic risk factors thus at risk for MASH cirrhosis, but also for cardiac cirrhosis, and being on amiodarone can also lead to cirrhosis over time.   - Unable to accurately assess liver synthetic function, because his INR is affected by being on coumadin, and hypoalbuminemia might be partially nutritional etc.  - No imaging signs of portal hypertension, and his PLT count has been normal even in 12/2023 and thrombocytopenia noted only during this admission, thus uncertain whether it signals CSPH or related to his COVID.   - Elastography not available inpatient and would not be accurate either in setting of severe liver injury / inflammation  - Notably, the acute rise of his liver enzymes is not explained by his cirrhosis alone.  - No overt HE.  - No Ascites.  HANK resolved.   - HCC status: no focal hepatic lesion on CT a/p w/ iv  - EV status: unknown.   - TTE noted: Left ventricular endocardium is not well visualized; however, the left ventricular systolic function appears severely reduced. The right ventricle is not well visualized. A device lead is visualized. Percutaneous zeff-um-elkt mitral repair device with a Mitraclip.    Rec:  - Follow up CDL workup.   --- So far Hep C neg, Hep B neg.   --- Still get HBcAb total, A1AT, AI workup, ceruloplasmin.   ---Repeat iron studies in outpatient setting. Reportedly has FHx of hemochromatosis, thus agree w/ sending HFE mutation, although his elevated ferritin can be also explained by his recent COVID.  - He can have elastography outpatient once acute issues resolved and liver enzymes improved.  - C/w monitoring LFTs, MELD labs  - Cardiac optimization per cardiology; appreciate input on amiodarone, being held from today  - Avoid hepatotoxic medications, change Tylenol order, limit < 2g (preferred to be avoided for now)      Thank you for consult  Will continue to monitor.   D/w primary team, cardiology

## 2024-03-21 NOTE — CHART NOTE - NSCHARTNOTEFT_GEN_A_CORE
ELECTROPHYSIOLOGY    Device : Fortify Assura VR 1375-40C ICD  SN#  [id number]  Implant date: 2024    Estimated battery longevity:  3.4-4.7 years    Mode: VVI    Base rate 40bpm    Ventricular Tachy:  VT1     150bpm          Monitor only  VT2      171bpm           ATP x3, 36J, 40J, 40J x2  VF       214bpm           ATPx1, 36J, 40J, 40J x4    Leads:    RV    Sensin.7 mV    Impedance:  440 ohms    Threshold: 1.0 V @ 0.4ms    Pulse Amplitude/ Pulse width: 5.0 V  @0.5 ms      Shock Impedance:  50 Ohms    Counters (Since 2024)   <1%    Episodes: None    Changes made: None    Conclusion: Normal device function.  The device is not MRI Conditional.

## 2024-03-21 NOTE — PROGRESS NOTE ADULT - PROBLEM SELECTOR PLAN 9
hx of DM insulin dependent, also on ozempic and faxiga  -appears to have been on 30 U lantus bid + sliding scale regular insulin???  -A1c 6.9  -last dose of ozempic 3/10 (missed 3/17 dose)   -sugars have been well controlled w/o lantus   - 36U total in last 24 hrs (3/20-3/21)--> decrease insulin to 18U lanus qAM and 3U lispro TID ac w/ SSI

## 2024-03-21 NOTE — PHYSICAL THERAPY INITIAL EVALUATION ADULT - PHYSICAL ASSIST/NONPHYSICAL ASSIST: SIT/SUPINE, REHAB EVAL
Problem: Adult Inpatient Plan of Care  Goal: Plan of Care Review  Outcome: Ongoing, Progressing  Flowsheets (Taken 8/29/2023 1245)  Progress: improving  Plan of Care Reviewed With: patient  Outcome Evaluation: Patient alert and oriented throughout shift. Patient is on 2L NC, patient refuses O2 tank for ride home. No complaints of pain or discomfort at this time. Patient is to be discharged later on today and will be using [employer] service.   Goal Outcome Evaluation:  Plan of Care Reviewed With: patient        Progress: improving  Outcome Evaluation: Patient alert and oriented throughout shift. Patient is on 2L NC, patient refuses O2 tank for ride home. No complaints of pain or discomfort at this time. Patient is to be discharged later on today and will be using [employer] service.          verbal cues/nonverbal cues (demo/gestures)

## 2024-03-21 NOTE — PROGRESS NOTE ADULT - SUBJECTIVE AND OBJECTIVE BOX
PGY-1 Progress Note discussed with attending    PAGER #: [phone]] TILL 5:00 PM  PLEASE CONTACT ON CALL TEAM:  - On Call Team (Please refer to Primo) FROM 5:00 PM - 8:30PM  - Nightfloat Team FROM 8:30 -7:30 AM    CC: Patient is a 78y old  Male who presents with a chief complaint of UTI, fall (20 Mar 2024 15:06)      OVERNIGHT EVENTS: No acute events. On home CPAP settings overnight    SUBJECTIVE / INTERVAL HPI: Patient seen and examined at bedside. Complaining of mild shortness of breath. spO2 noted to be in low 90s and placed on 2L nasal canula with improvement.     ROS:  CONSTITUTIONAL: No weakness, fevers or chills  EYES/ENT: No visual changes;  +vertigo, no tinnitus, no ear fullness, no throat pain   NECK: No pain or stiffness  RESPIRATORY: +cough, no wheezing, hemoptysis; No shortness of breath  CARDIOVASCULAR: No chest pain or palpitations  GASTROINTESTINAL: No abdominal or epigastric pain. No nausea, vomiting, or hematemesis; No diarrhea or constipation. No melena or hematochezia.  GENITOURINARY: No dysuria, frequency or hematuria  NEUROLOGICAL: No numbness or weakness  SKIN: No itching, burning, rashes, or lesions     VITAL SIGNS:  Vital Signs Last 24 Hrs  T(C): 36.3 (21 Mar 2024 11:20), Max: 37 (20 Mar 2024 23:48)  T(F): 97.4 (21 Mar 2024 11:20), Max: 98.6 (20 Mar 2024 23:48)  HR: 52 (21 Mar 2024 11:20) (52 - 60)  BP: 107/60 (21 Mar 2024 11:20) (96/64 - 147/72)  BP(mean): 74 (21 Mar 2024 08:37) (74 - 74)  RR: 18 (21 Mar 2024 12:02) (17 - 18)  SpO2: 93% (21 Mar 2024 12:02) (91% - 96%)    Parameters below as of 21 Mar 2024 12:02  Patient On (Oxygen Delivery Method): nasal cannula  O2 Flow (L/min): 2      PHYSICAL EXAM:    General: WDWN, no acute distress  HEENT: NC/AT; PERRL, anicteric sclera; MMM  Neck: supple  Cardiovascular: +S1/S2; RRR  Respiratory: mild bibasilar rales; no Wheezing  Gastrointestinal: soft, NT/mildly distended; +BSx4, no RUQ tenderness  Extremities: WWP; no edema, clubbing or cyanosis  Vascular: 2+ radial, DP/PT pulses B/L  Skin: Warm, dry, good turgor, no rashes, extensive ecchymoses over forearms and hands  Neurological: AAOx3; CNII-XII grossly intact, right horizontal gaze evoked nystagmus, preserved and equal strength in all extremities    MEDICATIONS:  MEDICATIONS  (STANDING):  aMIOdarone    Tablet 400 milliGRAM(s) Oral daily  brimonidine 0.2% Ophthalmic Solution 1 Drop(s) Both EYES at bedtime  carvedilol 3.125 milliGRAM(s) Oral every 12 hours  clopidogrel Tablet 75 milliGRAM(s) Oral at bedtime  furosemide    Tablet 40 milliGRAM(s) Oral daily  insulin glargine Injectable (LANTUS) 30 Unit(s) SubCutaneous every morning  insulin lispro (ADMELOG) corrective regimen sliding scale   SubCutaneous three times a day before meals  insulin lispro (ADMELOG) corrective regimen sliding scale   SubCutaneous at bedtime  insulin lispro Injectable (ADMELOG) 3 Unit(s) SubCutaneous three times a day before meals  latanoprost 0.005% Ophthalmic Solution 1 Drop(s) Both EYES at bedtime  levothyroxine 100 MICROGram(s) Oral daily  meclizine 25 milliGRAM(s) Oral three times a day  spironolactone 25 milliGRAM(s) Oral daily    MEDICATIONS  (PRN):  acetaminophen     Tablet .. 650 milliGRAM(s) Oral every 6 hours PRN Temp greater or equal to 38C (100.4F), Mild Pain (1 - 3)  benzonatate 100 milliGRAM(s) Oral three times a day PRN Cough  guaiFENesin Oral Liquid (Sugar-Free) 100 milliGRAM(s) Oral every 6 hours PRN Cough  melatonin 3 milliGRAM(s) Oral at bedtime PRN Insomnia  ondansetron Injectable 4 milliGRAM(s) IV Push every 8 hours PRN Nausea and/or Vomiting      ALLERGIES:  Allergies    No Known Allergies    Intolerances        LABS:                        17.7   5.90  )-----------( 114      ( 21 Mar 2024 05:04 )             52.3     03-21    139  |  107  |  23<H>  ----------------------------<  89  3.5   |  29  |  0.96    Ca    8.6      21 Mar 2024 05:04  Phos  2.7     03-21  Mg     1.7     03-21    TPro  6.2  /  Alb  2.7<L>  /  TBili  1.0  /  DBili  0.6<H>  /  AST  439<H>  /  ALT  635<H>  /  AlkPhos  87  03-21    PT/INR - ( 21 Mar 2024 05:04 )   PT: 25.7 sec;   INR: 2.31 ratio         PTT - ( 21 Mar 2024 05:04 )  PTT:40.7 sec  Urinalysis Basic - ( 21 Mar 2024 05:04 )    Color: x / Appearance: x / SG: x / pH: x  Gluc: 89 mg/dL / Ketone: x  / Bili: x / Urobili: x   Blood: x / Protein: x / Nitrite: x   Leuk Esterase: x / RBC: x / WBC x   Sq Epi: x / Non Sq Epi: x / Bacteria: x      CAPILLARY BLOOD GLUCOSE      POCT Blood Glucose.: 121 mg/dL (21 Mar 2024 11:14)      RADIOLOGY & ADDITIONAL TESTS: Reviewed.

## 2024-03-21 NOTE — PHYSICAL THERAPY INITIAL EVALUATION ADULT - GAIT DISTANCE, PT EVAL
pt performed multidirectional ambulation at bedside, including ~ 3 steps forward, ~ 3 steps backward, & ~ 2 steps bilaterally/10 feet pt performed multidirectional ambulation at bedside, including ~ 3 steps forward, ~ 3 steps backward, & ~ 2 steps bilaterally. Duration of gait trial restricted by pt presentation of dizziness/loss of coordination and requiring to sit down./10 feet pt performed multidirectional ambulation at bedside, including ~ 3 steps forward, ~ 3 steps backward, & ~ 2 steps bilaterally. Duration of gait trial restricted by pt presentation of dizziness/mild decline of coordination and requiring to sit down./10 feet

## 2024-03-21 NOTE — PROGRESS NOTE ADULT - PROBLEM SELECTOR PLAN 3
p/w ,  significantly elevated from baseline  -CT abdomen shows hepatic cirrhosis morphology  -ASA/acetaminophen levels normal  -Hepatology Dr. Lazcano consulted  -f/u Hep A IgM, HBsAg, HBcAb, HBsAb, HBcAb IgM, HCV RNA, Hep E IgM/PCR, CHASTITY, SMA, Ig panel, ferritin, iron/TIBC, salicylate level, Utox, Peth, EBV, CMV, HSV PCRs  -LFTs downtrending AST//649 Dbili 0.6 today  -Pending RUQ US  -Outpatient elastography  -hold statin  -likely will hold amiodarone also in s/o bradycardia, pending collateral from outpt. cardio Dr. Gilbert Shipman

## 2024-03-21 NOTE — PROGRESS NOTE ADULT - PROBLEM SELECTOR PLAN 4
hx of HFrEF with last EF 30% as per o/p documentation  -CT chest w/ multifocal GGOs, pulmonary edema vs covid  -currently euvolemic appearing on exam aside from mild rales, not in exacerbation  -cont home lasix, spironolactone, coreg  -restart Entresto as BP allows  -Cardiology Dr. Rivera consulted

## 2024-03-21 NOTE — PROGRESS NOTE ADULT - PROBLEM SELECTOR PLAN 1
As per o/p notes in [facility] from Dr. Vieira, pt has rapid deterioration in overall neuro function with increased weakness, and apparently he has had decreasing motor skills and cannot dress himself anymore.  -presenting s/p fall while shaving with headstrike on 3/17, likely 2/2 vertigo  -CTH neg for acute pathology  -f/u TTE  -Neurology consulted, Dr. Milligan  -PT consult

## 2024-03-21 NOTE — CONSULT NOTE ADULT - NS ATTEND AMEND GEN_ALL_CORE FT
78 year old M with CAD s/p stents and CABG, ischemic cardiomyopathy (EF 30%), history of cardiac arrest 2014 and VT s/p MDT single chamber ICD, severe MR s/p mitral clip in 2023 at [facility], pAF on Coumadin who presents with dizziness and fall.    1) Dizziness and fall  - ICD interrogation did not show significant events. Pt is V-paced less than 1% of the time  - EKG showed likely sinus bradycardia with non-specific IVCD and inferior q wave  - F/U neurology recs as presentation less likely cardiac-related    2) CAD s/p stents and CABG, last LHC in [facility] 12/2023 with patent grafts  3) ischemic cardiomyopathy (EF 30%)  4) history of cardiac arrest 2014 and VT s/p MDT single chamber ICD  5) severe MR s/p mitral clip in 2023  6) pAF on Coumadin  - His proBNP was normal. He is compensated and euvolemic on exam. On Lasix 40mg daily  - GDMT: aldactone 25, coreg 3.125 q12h. Home Farxiga on hold. Would resume Entresto 24-26 BID when SBP > 100 (last BP in clinic was 100/60).  - continue Plavix for CAD  - Continue Coumadin for stroke prevention    7) Transaminitis,   - f/u hepatology recs  - Given significant transaminitis, would hold amiodarone and statin at this time

## 2024-03-21 NOTE — CONSULT NOTE ADULT - ASSESSMENT
Pt is a 79 yo M from home, ambulates with cane, with CAD x5 stents on warfarin and plavix, cardiac arrest in 2014, CHF (EF 30%), PPM/AICD, paroxysmal AF, CKD3, chronic vertigo, DM2, chronic castellano, recurrent UTI, hypothyroid, hx mitral valve clipping, "bladder spot" requiring further o/p workup admitted with UTI, chronic Castellano, mechanical fall, and covid.  Cardiology was consulted Pt is a 77 yo M from home, ambulates with cane, with CAD x5 stents on warfarin and plavix, cardiac arrest in 2014, CHF (EF 30%), PPM/AICD, paroxysmal AF, CKD3, chronic vertigo, DM2, chronic castellano, recurrent UTI, hypothyroid, hx mitral valve clipping, "bladder spot" requiring further o/p workup admitted with UTI, chronic Castellano, mechanical fall, and covid.  Cardiology was consulted to comment on bradycardia and amiodarone use.

## 2024-03-21 NOTE — CONSULT NOTE ADULT - PROBLEM SELECTOR RECOMMENDATION 3
"Shravan Ma" Derek was seen and treated in our emergency department on 9/6/2022.  He may return to work on 09/07/2022.       If you have any questions or concerns, please don't hesitate to call.      AARON FAUST    " -no c/o chest pain, no ischemia evidence on ECG, normal trops on admission  -continue ASA, Plavix

## 2024-03-21 NOTE — PHYSICAL THERAPY INITIAL EVALUATION ADULT - LEVEL OF INDEPENDENCE: STAIR NEGOTIATION, REHAB EVAL
Pt attempted stairs, however was unable to lift his leg to the height of the step./unable to perform

## 2024-03-21 NOTE — PHYSICAL THERAPY INITIAL EVALUATION ADULT - COORDINATION ASSESSED, REHAB EVAL
Pt presents w/ mild decline in UE coordination. Pt successfully performed 3/5 trials w/ inconsistent over/undershooting of PT finger./finger to nose

## 2024-03-21 NOTE — PROGRESS NOTE ADULT - PROBLEM SELECTOR PLAN 5
p/w hbg 17.3, Hct 50.8, normal MCV  -repeat hbg downtrend to 15.9 then back up to 17.5-->17.7  -total iron 24, % saturation 11, Ferritin 1623-->1594    -f/u serum EPO   -possibly 2ndry PCV, patient has LUCIA but excellent compliance with nocturnal CPAP per wife

## 2024-03-21 NOTE — PHYSICAL THERAPY INITIAL EVALUATION ADULT - GENERAL OBSERVATIONS, REHAB EVAL
Consult received, chart reviewed. Patient received supine in bed, NAD, + alarm, + castellano. Patient agreed to EVALUATION from Physical Therapist.

## 2024-03-21 NOTE — PROGRESS NOTE ADULT - SUBJECTIVE AND OBJECTIVE BOX
Chief Complaint:  Patient is a 78y old  Male who presents with a chief complaint of UTI, fall (20 Mar 2024 15:06)      Reason for consult:    Interval Events:     Hospital Medications:  acetaminophen     Tablet .. 650 milliGRAM(s) Oral every 6 hours PRN  aMIOdarone    Tablet 400 milliGRAM(s) Oral daily  benzonatate 100 milliGRAM(s) Oral three times a day PRN  brimonidine 0.2% Ophthalmic Solution 1 Drop(s) Both EYES at bedtime  carvedilol 3.125 milliGRAM(s) Oral every 12 hours  clopidogrel Tablet 75 milliGRAM(s) Oral at bedtime  furosemide    Tablet 40 milliGRAM(s) Oral daily  guaiFENesin Oral Liquid (Sugar-Free) 100 milliGRAM(s) Oral every 6 hours PRN  insulin glargine Injectable (LANTUS) 30 Unit(s) SubCutaneous every morning  insulin lispro (ADMELOG) corrective regimen sliding scale   SubCutaneous three times a day before meals  insulin lispro (ADMELOG) corrective regimen sliding scale   SubCutaneous at bedtime  insulin lispro Injectable (ADMELOG) 3 Unit(s) SubCutaneous three times a day before meals  latanoprost 0.005% Ophthalmic Solution 1 Drop(s) Both EYES at bedtime  levothyroxine 100 MICROGram(s) Oral daily  meclizine 25 milliGRAM(s) Oral three times a day  melatonin 3 milliGRAM(s) Oral at bedtime PRN  ondansetron Injectable 4 milliGRAM(s) IV Push every 8 hours PRN  spironolactone 25 milliGRAM(s) Oral daily      ROS:   General:  No  fevers, chills, night sweats, fatigue  Eyes:  Good vision, no reported pain  ENT:  No sore throat, pain, runny nose  CV:  No pain, palpitations  Pulm:  No dyspnea, cough  GI:  See HPI, otherwise negative  :  No  incontinence, nocturia  Muscle:  No pain, weakness  Neuro:  No memory problems  Psych:  No insomnia, mood problems, depression  Endocrine:  No polyuria, polydipsia, cold/heat intolerance  Heme:  No petechiae, ecchymosis, easy bruisability  Skin:  No rash    PHYSICAL EXAM:   Vital Signs:  Vital Signs Last 24 Hrs  T(C): 36.3 (21 Mar 2024 11:20), Max: 37 (20 Mar 2024 23:48)  T(F): 97.4 (21 Mar 2024 11:20), Max: 98.6 (20 Mar 2024 23:48)  HR: 52 (21 Mar 2024 11:20) (52 - 60)  BP: 107/60 (21 Mar 2024 11:20) (96/64 - 147/72)  BP(mean): 74 (21 Mar 2024 08:37) (74 - 74)  RR: 18 (21 Mar 2024 12:02) (17 - 18)  SpO2: 93% (21 Mar 2024 12:02) (91% - 96%)    Parameters below as of 21 Mar 2024 12:02  Patient On (Oxygen Delivery Method): nasal cannula  O2 Flow (L/min): 2    Daily     Daily Weight in k.4 (21 Mar 2024 05:01)    GENERAL: no acute distress  NEURO: alert, no asterixis  HEENT: anicteric sclera, no conjunctival pallor appreciated  CHEST: no respiratory distress, no accessory muscle use  CARDIAC: regular rate, rhythm  ABDOMEN: soft, non-tender, non-distended, no rebound or guarding  EXTREMITIES: warm, well perfused, no edema  SKIN: no lesions noted    LABS: reviewed                        17.7   5.90  )-----------( 114      ( 21 Mar 2024 05:04 )             52.3     03-21    139  |  107  |  23<H>  ----------------------------<  89  3.5   |  29  |  0.96    Ca    8.6      21 Mar 2024 05:04  Phos  2.7     03-21  Mg     1.7     03-21    TPro  6.2  /  Alb  2.7<L>  /  TBili  1.0  /  DBili  0.6<H>  /  AST  439<H>  /  ALT  635<H>  /  AlkPhos  87  03-21    LIVER FUNCTIONS - ( 21 Mar 2024 05:04 )  Alb: 2.7 g/dL / Pro: 6.2 g/dL / ALK PHOS: 87 U/L / ALT: 635 U/L DA / AST: 439 U/L / GGT: x             Interval Diagnostic Studies: see sunrise for full report   Chief Complaint:  Patient is a 78y old  Male who presents with a chief complaint of UTI, fall (20 Mar 2024 15:06)      Reason for consult: Elevated liver enzymes    Interval Events: Case was d/w cardiology and will hold amiodarone.     Hospital Medications:  acetaminophen     Tablet .. 650 milliGRAM(s) Oral every 6 hours PRN  aMIOdarone    Tablet 400 milliGRAM(s) Oral daily  benzonatate 100 milliGRAM(s) Oral three times a day PRN  brimonidine 0.2% Ophthalmic Solution 1 Drop(s) Both EYES at bedtime  carvedilol 3.125 milliGRAM(s) Oral every 12 hours  clopidogrel Tablet 75 milliGRAM(s) Oral at bedtime  furosemide    Tablet 40 milliGRAM(s) Oral daily  guaiFENesin Oral Liquid (Sugar-Free) 100 milliGRAM(s) Oral every 6 hours PRN  insulin glargine Injectable (LANTUS) 30 Unit(s) SubCutaneous every morning  insulin lispro (ADMELOG) corrective regimen sliding scale   SubCutaneous three times a day before meals  insulin lispro (ADMELOG) corrective regimen sliding scale   SubCutaneous at bedtime  insulin lispro Injectable (ADMELOG) 3 Unit(s) SubCutaneous three times a day before meals  latanoprost 0.005% Ophthalmic Solution 1 Drop(s) Both EYES at bedtime  levothyroxine 100 MICROGram(s) Oral daily  meclizine 25 milliGRAM(s) Oral three times a day  melatonin 3 milliGRAM(s) Oral at bedtime PRN  ondansetron Injectable 4 milliGRAM(s) IV Push every 8 hours PRN  spironolactone 25 milliGRAM(s) Oral daily        PHYSICAL EXAM:   Vital Signs:  Vital Signs Last 24 Hrs  T(C): 36.3 (21 Mar 2024 11:20), Max: 37 (20 Mar 2024 23:48)  T(F): 97.4 (21 Mar 2024 11:20), Max: 98.6 (20 Mar 2024 23:48)  HR: 52 (21 Mar 2024 11:20) (52 - 60)  BP: 107/60 (21 Mar 2024 11:20) (96/64 - 147/72)  BP(mean): 74 (21 Mar 2024 08:37) (74 - 74)  RR: 18 (21 Mar 2024 12:02) (17 - 18)  SpO2: 93% (21 Mar 2024 12:02) (91% - 96%)    Parameters below as of 21 Mar 2024 12:02  Patient On (Oxygen Delivery Method): nasal cannula  O2 Flow (L/min): 2    Daily     Daily Weight in k.4 (21 Mar 2024 05:01)      LABS: reviewed                        17.7   5.90  )-----------( 114      ( 21 Mar 2024 05:04 )             52.3     03-    139  |  107  |  23<H>  ----------------------------<  89  3.5   |  29  |  0.96    Ca    8.6      21 Mar 2024 05:04  Phos  2.7     03-  Mg     1.7     03-    TPro  6.2  /  Alb  2.7<L>  /  TBili  1.0  /  DBili  0.6<H>  /  AST  439<H>  /  ALT  635<H>  /  AlkPhos  87  03-21    LIVER FUNCTIONS - ( 21 Mar 2024 05:04 )  Alb: 2.7 g/dL / Pro: 6.2 g/dL / ALK PHOS: 87 U/L / ALT: 635 U/L DA / AST: 439 U/L / GGT: x             Interval Diagnostic Studies: see sunrise for full report

## 2024-03-21 NOTE — PHYSICAL THERAPY INITIAL EVALUATION ADULT - GAIT DEVIATIONS NOTED, PT EVAL
decreased declan/increased time in double stance/decreased velocity of limb motion/decreased step length/decreased stride length/decreased swing-to-stance ratio/decreased weight-shifting ability

## 2024-03-21 NOTE — PHYSICAL THERAPY INITIAL EVALUATION ADULT - ORIENTATION, REHAB EVAL
pt aware he's in a [facility] hospital, but not which one/person/time/situation

## 2024-03-21 NOTE — PHYSICAL THERAPY INITIAL EVALUATION ADULT - IMPAIRMENTS FOUND, PT EVAL
Cognition/cognitive impairment/fine motor/gait, locomotion, and balance/gross motor/muscle strength/poor safety awareness/posture/ROM

## 2024-03-21 NOTE — CONSULT NOTE ADULT - PROBLEM SELECTOR RECOMMENDATION 4
- Stable  -DASH/TLC  - BP goal of < 140/90   - c/w home meds w/ holding parameters  - monitor BP & titrate BP meds PRN

## 2024-03-21 NOTE — PHYSICAL THERAPY INITIAL EVALUATION ADULT - OCCUPATION
6/10/2021      Kiley Casas  [address] [place] [address]        Dear Ms. Casas,    Please carefully read through the enclosed prep instructions at least 7 days prior to your procedure with Dr. Elmer Pelletier.  If you have questions regarding the instructions, please call [phone].    Procedure and arrival times may occasionally change.  Dependent on the facility where your procedure is scheduled, you may receive a phone call 3-7 days prior to confirm those times.    Date:  September 22, 2021  Procedure Time:  9:00 am  Arrival Time: 8:00 am  Location:    [address] (Please check in on the 2nd floor)  [place]  [address]  [phone]      If you need to cancel or reschedule your procedure, please contact the  at the number listed below.    Thank you,    Kourtney [phone]  Surgery Scheduler  Pre-Admit Department              Egd & Colonoscopy with Dulcolax, Miralax and Gatorade  Pre-Procedure Instructions      If you are taking the diet drug, Phentermine, you must stop taking this drug 14 days prior to the procedure.     Purchase a 119 gram and a 238 gram bottle of Miralax from any pharmacy. This is an over the counter medication, no prescription is needed and is found in the laxative aisle.  You can purchase the generic store brand.    Purchase Dulcolax laxative tablets (not stool softener or suppository) from any pharmacy. These are tablets that you will swallow. You will need to purchase total of 6 tablets. The pharmacist can help you determine that you have the correct medication. This is an over the counter medication, no prescription is needed.    TRANSPORTATION:  Make arrangements for someone to drive you home after the procedure because you will be very drowsy. Please make these arrangements in advance. A taxi is not acceptable transportation. If you do not have transportation arranged, we will not be able to  do the colonoscopy.  Make arrangements for someone to stay with you or check in on you frequently the rest of the day/evening until the drowsiness has completely worn off.      CANCELLATION OR RESCHEDULING:  Due to everyone's busy schedules, it is important that you keep your appointment. If you must reschedule or cancel your appointment, please notify your physician's office at least 48 hours in advance.      AFTER YOUR COLONOSCOPY:  You will receive after-procedure information and instructions. In addition:  Do not plan on going to work or doing anything for the rest of the day.  You may resume eating and drinking with no limitations following the procedure.        ADDITIONAL INSTRUCTIONS:      BOWEL PREPARATION:  To complete a successful colonoscopy, the bowel must be clean so that the physician can clearly view the colon. It is very important that you read all the instructions well in advance of the procedure. Without proper preparation, the colonoscopy will not be successful and the test may have to be repeated.      SEVEN (7) DAYS BEFORE THE PROCEDURE:  Do not take any Plavix, Brillinta, Effient, Aggrenox, Pepto-Bismol or iron supplements. If you have any questions or concerns about holding any of these medications, please contact your cardiologist or primary care physician.    Do not eat popcorn, seeds, nuts or whole kernel corn.       FIVE (5) DAYS BEFORE THE PROCEDURE:  Do not eat products with [place] (a fat substitute found in [employer] and [place] Fat-Free chips) for 5 days before the procedure.    THREE (3) DAYS BEFORE THE PROCEDURE:  Do not take any Coumadin (warfarin), Xeralto  or Pradaxa. Please contact your cardiologist or primary care physician for any questions or concerns regarding holding this medication.       SPECIAL CONDITIONS:  Contact your primary care physician if you have diabetes and take insulin. You may need to have your insulin dose adjusted the day before and the day of  the procedure.    Have some clear liquids (without red or purple coloring) available to drink or eat. Clear liquids (that you can see through) include water, coffee or tea without creamer, Gatorade/clear sports drinks, Popsicles, carbonated or non-carbonated soft drinks, Henrik-Aid or other flavored drinks, plain Jell-O without fruit or toppings, hard candy and broth.You may also have fruit juices that are clear such as apple or white grape. Do not have any juices that the fruit has pulp such as orange, pineapple and prune.    If you experience rectal irritation due to frequent stooling, you may apply Desitin, Balmex, A and D ointment, or a similar product.      TWO (2) DAYS BEFORE THE PROCEDURE:   From the 119 gram bottle of Miralax, mix 4 doses (4 capfuls) in 16 oz of water or any clear liquid and drink this anywhere between 3-5 pm    You will need to take 2 dulcolax tablets at 7 pm.    You can continue your normal activities and diet until tomorrow morning at which time you will need to follow the instructions below.         DAY BEFORE YOUR COLONOSCOPY:  You may have only clear liquids (without red or purple coloring) available or drink or eat. Clear liquids (that you can see through) include water, coffee or tea without creamer, Gatorade/clear sports drinks, Popsicles, carbonated or non-carbonated soft drinks, Henrik-Aid or other flavored drinks, plain Jell-O without fruit or topping, hard candy and broth. You may also have fruit juices that are clear such as apple or white grape. Do not have any juices that the fruit has pulp such as orange, pineapple and prune.  Do not drink any alcoholic beverages for at least 24 hours before the procedure.  Do not  take Lomotil, Imodium, Effer-syllium, Metamucil, Citrucel, Konsyl, Hydrocil, or FiberCon.    In the morning, mix the entire 238 gram bottle of Miralax with 2 quarts of Gatorade (not red or purple) in a large pitcher. Mix well and put in the refrigerator to chill. You  will drink it later in the day.   Anywhere between 2-4 pm take all four Dulcolax tablets. It will probably take about 2-3 hours before the action begins.  Two hours after taking the Dulcolax, start drinking the Miralax mixture. You should pace yourself to drink about eight, 8 ounce glasses (64 oz.) over a 2-4 hour period, roughly one glass every 15-30 minutes. Continue to drink the prep until it is gone and regardless of stool frequency. If you dislike the taste, use a straw to help drink it. Continue to drink clear liquids (your physician recommends 64 ounces of Gatorade) throughout the evening. If you are a diabetic, please do not drink Gatorade since it is made with sugar. An alternative is PowerAde Zero which can be bought at the same locations as Gatorade. Do not eat or drink anything after midnight.      DAY OF YOUR COLONOSCOPY  Do not eat or drink anything until after the procedure. Take heart and /or blood pressure medications with a small sip of water. Do not take any other medications until after the procedure.  In the morning, your stools should have a clear to see-through cloudy yellow appearance with no formed substance. If your stools are darker or have formed substance, please call the location where your procedure is scheduled to be done and ask for the pre-op nurse, who will get further instructions from your physician.                                       Retired

## 2024-03-21 NOTE — CONSULT NOTE ADULT - TIME BILLING
I counseled the patient and primary team about the further testing indicated to help determine the etiology of the patient's encephalopathy.
review of EMR , coordination of care, discussion with family and patient, and communication with primary team and primary cardiologist Dr. Shipman.

## 2024-03-21 NOTE — CONSULT NOTE ADULT - PROBLEM SELECTOR RECOMMENDATION 2
- Pt appears clinically normovolemic, no symptoms of SOB, LE edema  - BNP _320 on admission  -continue home medications Lasix, Coreg, Spironolactone  -may resume Entresto when able to   - Low salt, low cholesterol diet - Pt appears clinically normovolemic, no symptoms of SOB, LE edema  - BNP _320 on admission  -continue home medications Lasix, Coreg, Spironolactone  -may resume Entresto if SBP > 100  - Low salt, low cholesterol diet

## 2024-03-22 LAB
ALBUMIN SERPL ELPH-MCNC: 2.4 G/DL — LOW (ref 3.5–5)
ALP SERPL-CCNC: 80 U/L — SIGNIFICANT CHANGE UP (ref 40–120)
ALT FLD-CCNC: 498 U/L DA — HIGH (ref 10–60)
ANA TITR SER: NEGATIVE — SIGNIFICANT CHANGE UP
ANION GAP SERPL CALC-SCNC: 6 MMOL/L — SIGNIFICANT CHANGE UP (ref 5–17)
AST SERPL-CCNC: 299 U/L — HIGH (ref 10–40)
BASOPHILS # BLD AUTO: 0.02 K/UL — SIGNIFICANT CHANGE UP (ref 0–0.2)
BASOPHILS NFR BLD AUTO: 0.3 % — SIGNIFICANT CHANGE UP (ref 0–2)
BILIRUB DIRECT SERPL-MCNC: 0.5 MG/DL — HIGH (ref 0–0.3)
BILIRUB INDIRECT FLD-MCNC: 0.5 MG/DL — SIGNIFICANT CHANGE UP (ref 0.2–1)
BILIRUB SERPL-MCNC: 1 MG/DL — SIGNIFICANT CHANGE UP (ref 0.2–1.2)
BUN SERPL-MCNC: 27 MG/DL — HIGH (ref 7–18)
CALCIUM SERPL-MCNC: 8.6 MG/DL — SIGNIFICANT CHANGE UP (ref 8.4–10.5)
CHLORIDE SERPL-SCNC: 105 MMOL/L — SIGNIFICANT CHANGE UP (ref 96–108)
CO2 SERPL-SCNC: 29 MMOL/L — SIGNIFICANT CHANGE UP (ref 22–31)
CREAT SERPL-MCNC: 1.05 MG/DL — SIGNIFICANT CHANGE UP (ref 0.5–1.3)
EGFR: 73 ML/MIN/1.73M2 — SIGNIFICANT CHANGE UP
EOSINOPHIL # BLD AUTO: 0.09 K/UL — SIGNIFICANT CHANGE UP (ref 0–0.5)
EOSINOPHIL NFR BLD AUTO: 1.4 % — SIGNIFICANT CHANGE UP (ref 0–6)
EPO SERPL-MCNC: 9 MIU/ML — SIGNIFICANT CHANGE UP (ref 2.6–18.5)
GLUCOSE BLDC GLUCOMTR-MCNC: 113 MG/DL — HIGH (ref 70–99)
GLUCOSE BLDC GLUCOMTR-MCNC: 117 MG/DL — HIGH (ref 70–99)
GLUCOSE BLDC GLUCOMTR-MCNC: 176 MG/DL — HIGH (ref 70–99)
GLUCOSE BLDC GLUCOMTR-MCNC: 190 MG/DL — HIGH (ref 70–99)
GLUCOSE SERPL-MCNC: 112 MG/DL — HIGH (ref 70–99)
HCT VFR BLD CALC: 50.8 % — HIGH (ref 39–50)
HGB BLD-MCNC: 17 G/DL — SIGNIFICANT CHANGE UP (ref 13–17)
IMM GRANULOCYTES NFR BLD AUTO: 0.6 % — SIGNIFICANT CHANGE UP (ref 0–0.9)
INR BLD: 2.73 RATIO — HIGH (ref 0.85–1.18)
LYMPHOCYTES # BLD AUTO: 1.56 K/UL — SIGNIFICANT CHANGE UP (ref 1–3.3)
LYMPHOCYTES # BLD AUTO: 23.8 % — SIGNIFICANT CHANGE UP (ref 13–44)
MAGNESIUM SERPL-MCNC: 1.7 MG/DL — SIGNIFICANT CHANGE UP (ref 1.6–2.6)
MCHC RBC-ENTMCNC: 32.3 PG — SIGNIFICANT CHANGE UP (ref 27–34)
MCHC RBC-ENTMCNC: 33.5 GM/DL — SIGNIFICANT CHANGE UP (ref 32–36)
MCV RBC AUTO: 96.4 FL — SIGNIFICANT CHANGE UP (ref 80–100)
MONOCYTES # BLD AUTO: 0.73 K/UL — SIGNIFICANT CHANGE UP (ref 0–0.9)
MONOCYTES NFR BLD AUTO: 11.1 % — SIGNIFICANT CHANGE UP (ref 2–14)
NEUTROPHILS # BLD AUTO: 4.11 K/UL — SIGNIFICANT CHANGE UP (ref 1.8–7.4)
NEUTROPHILS NFR BLD AUTO: 62.8 % — SIGNIFICANT CHANGE UP (ref 43–77)
NRBC # BLD: 0 /100 WBCS — SIGNIFICANT CHANGE UP (ref 0–0)
PHOSPHATE SERPL-MCNC: 2.7 MG/DL — SIGNIFICANT CHANGE UP (ref 2.5–4.5)
PLATELET # BLD AUTO: 119 K/UL — LOW (ref 150–400)
POTASSIUM SERPL-MCNC: 3.6 MMOL/L — SIGNIFICANT CHANGE UP (ref 3.5–5.3)
POTASSIUM SERPL-SCNC: 3.6 MMOL/L — SIGNIFICANT CHANGE UP (ref 3.5–5.3)
PROT SERPL-MCNC: 5.8 G/DL — LOW (ref 6–8.3)
PROTHROM AB SERPL-ACNC: 30.2 SEC — HIGH (ref 9.5–13)
RBC # BLD: 5.27 M/UL — SIGNIFICANT CHANGE UP (ref 4.2–5.8)
RBC # FLD: 15.1 % — HIGH (ref 10.3–14.5)
SODIUM SERPL-SCNC: 140 MMOL/L — SIGNIFICANT CHANGE UP (ref 135–145)
WBC # BLD: 6.55 K/UL — SIGNIFICANT CHANGE UP (ref 3.8–10.5)
WBC # FLD AUTO: 6.55 K/UL — SIGNIFICANT CHANGE UP (ref 3.8–10.5)

## 2024-03-22 PROCEDURE — 99232 SBSQ HOSP IP/OBS MODERATE 35: CPT

## 2024-03-22 PROCEDURE — 99233 SBSQ HOSP IP/OBS HIGH 50: CPT | Mod: FS

## 2024-03-22 PROCEDURE — 99232 SBSQ HOSP IP/OBS MODERATE 35: CPT | Mod: GC

## 2024-03-22 PROCEDURE — 99233 SBSQ HOSP IP/OBS HIGH 50: CPT

## 2024-03-22 RX ORDER — WARFARIN SODIUM 2.5 MG/1
2 TABLET ORAL ONCE
Refills: 0 | Status: COMPLETED | OUTPATIENT
Start: 2024-03-22 | End: 2024-03-22

## 2024-03-22 RX ORDER — SACUBITRIL AND VALSARTAN 24; 26 MG/1; MG/1
1 TABLET, FILM COATED ORAL
Refills: 0 | Status: DISCONTINUED | OUTPATIENT
Start: 2024-03-22 | End: 2024-03-26

## 2024-03-22 RX ORDER — METOPROLOL TARTRATE 50 MG
12.5 TABLET ORAL DAILY
Refills: 0 | Status: DISCONTINUED | OUTPATIENT
Start: 2024-03-22 | End: 2024-03-26

## 2024-03-22 RX ADMIN — INSULIN GLARGINE 12 UNIT(S): 100 INJECTION, SOLUTION SUBCUTANEOUS at 08:07

## 2024-03-22 RX ADMIN — Medication 25 MILLIGRAM(S): at 14:47

## 2024-03-22 RX ADMIN — Medication 40 MILLIGRAM(S): at 06:44

## 2024-03-22 RX ADMIN — CLOPIDOGREL BISULFATE 75 MILLIGRAM(S): 75 TABLET, FILM COATED ORAL at 21:47

## 2024-03-22 RX ADMIN — Medication 25 MILLIGRAM(S): at 06:44

## 2024-03-22 RX ADMIN — Medication 3 MILLIGRAM(S): at 21:48

## 2024-03-22 RX ADMIN — Medication 1: at 12:20

## 2024-03-22 RX ADMIN — Medication 1: at 16:49

## 2024-03-22 RX ADMIN — Medication 100 MICROGRAM(S): at 05:55

## 2024-03-22 RX ADMIN — Medication 100 MILLIGRAM(S): at 21:48

## 2024-03-22 RX ADMIN — SPIRONOLACTONE 25 MILLIGRAM(S): 25 TABLET, FILM COATED ORAL at 06:43

## 2024-03-22 RX ADMIN — WARFARIN SODIUM 2 MILLIGRAM(S): 2.5 TABLET ORAL at 21:48

## 2024-03-22 RX ADMIN — BRIMONIDINE TARTRATE 1 DROP(S): 2 SOLUTION/ DROPS OPHTHALMIC at 23:28

## 2024-03-22 RX ADMIN — Medication 25 MILLIGRAM(S): at 21:48

## 2024-03-22 RX ADMIN — LATANOPROST 1 DROP(S): 0.05 SOLUTION/ DROPS OPHTHALMIC; TOPICAL at 21:50

## 2024-03-22 RX ADMIN — SACUBITRIL AND VALSARTAN 1 TABLET(S): 24; 26 TABLET, FILM COATED ORAL at 18:30

## 2024-03-22 NOTE — PROGRESS NOTE ADULT - PROBLEM SELECTOR PLAN 8
cont amiodarone, warfarin  -daily INR d/c amiodarone  -c/w warfarin  -switch coreg to metoprolol   -daily INR

## 2024-03-22 NOTE — DIETITIAN INITIAL EVALUATION ADULT - FACTORS AFF FOOD INTAKE
acute on chronic comorbidities/persistent lack of appetite/Judaism/ethnic/cultural/personal food preferences

## 2024-03-22 NOTE — PROGRESS NOTE ADULT - SUBJECTIVE AND OBJECTIVE BOX
NEUROLOGY FOLLOW-UP NOTE    NAME:  FARHAT ROMO      ASSESSMENT:  78 RHM with likely aggravation of mild cognitive impairment vs. subacute necrotizing encephalopathy in the setting of recent COVID-19 infection      RECOMMENDATIONS:    - Vitamin B12, Folate, and TSH levels are unremarkable - No supplementation needed    - Follow up on COVID-19 infection status, and provide supportive care as needed    - MRI Brain deferred due to presence of PPM/AICD    - Outpatient followed for neurocognitive evaluation and potential PET-FDG CT Brain for evaluation for cerebral hypometabolism    - DVT ppx: SCDs, Warfarin          NOTE TO BE COMPLETED - PLEASE REFER TO ABOVE ONLY AND IGNORE INFORMATION BELOW    ******************************    HPI:  Pt is a 79 yo M from home, ambulates with cane, with CAD x5 stents on warfarin and plavix, cardiac arrest in 2014, CHF (EF 30%), PPM/AICD, paroxysmal AF, CKD3, chronic vertigo, DM2, chronic castellano, recurrent UTI, hypothyroid, hx mitral valve clipping, "bladder spot" requiring further o/p workup, presenting s/p fall backwards. Pt is a limited narrator. He says that he has a long history of vertigo, and that he got out of the shower, was at the sink, and got dizzy, lost his balance, when he fell backwards and hit back of his head against shower door. He denies any loc, convulsions, tongue biting, loss of bowel/bladder control. He called his wife for help. He says that he has had a long history of vertigo, says nothing has helped him, but that Epley maneuver is mildly effective. He says he bumps into things a lot because of the vertigo, and that he has bruises on his arms from this. He says that he has had longstanding rhinorrhea. He does also endorse that he tested pos for Covid on 3/8.  Denies nausea, vomiting, diarrhea, abdominal pain, SOB, chest pain, SAVAGE, palpitations, headache, cough, wheezing, joint pain or swelling, fever, chills.  (18 Mar 2024 23:08)      NEURO HPI:      INTERVAL HISTORY:      MEDICATIONS:  acetaminophen     Tablet .. 650 milliGRAM(s) Oral every 6 hours PRN  benzonatate 100 milliGRAM(s) Oral three times a day PRN  brimonidine 0.2% Ophthalmic Solution 1 Drop(s) Both EYES at bedtime  clopidogrel Tablet 75 milliGRAM(s) Oral at bedtime  furosemide    Tablet 40 milliGRAM(s) Oral daily  guaiFENesin Oral Liquid (Sugar-Free) 100 milliGRAM(s) Oral every 6 hours PRN  insulin glargine Injectable (LANTUS) 12 Unit(s) SubCutaneous every morning  insulin lispro (ADMELOG) corrective regimen sliding scale   SubCutaneous three times a day before meals  insulin lispro (ADMELOG) corrective regimen sliding scale   SubCutaneous at bedtime  latanoprost 0.005% Ophthalmic Solution 1 Drop(s) Both EYES at bedtime  levothyroxine 100 MICROGram(s) Oral daily  meclizine 25 milliGRAM(s) Oral three times a day  melatonin 3 milliGRAM(s) Oral at bedtime PRN  metoprolol succinate ER 12.5 milliGRAM(s) Oral daily  ondansetron Injectable 4 milliGRAM(s) IV Push every 8 hours PRN  spironolactone 25 milliGRAM(s) Oral daily  warfarin 2 milliGRAM(s) Oral once      ALLERGIES:  No Known Allergies      REVIEW OF SYSTEMS:  Fourteen systems reviewed and negative except as in HPI / Interval History.          OBJECTIVE:  Vital Signs Last 24 Hrs  T(C): 36.5 (22 Mar 2024 11:12), Max: 36.8 (22 Mar 2024 07:46)  T(F): 97.7 (22 Mar 2024 11:12), Max: 98.2 (22 Mar 2024 07:46)  HR: 56 (22 Mar 2024 11:12) (52 - 78)  BP: 102/55 (22 Mar 2024 11:12) (102/55 - 118/65)  RR: 19 (22 Mar 2024 11:12) (17 - 19)  SpO2: 96% (22 Mar 2024 11:12) (94% - 96%)  Parameters below as of 22 Mar 2024 11:12  Patient On (Oxygen Delivery Method): nasal cannula  O2 Flow (L/min): 2      General Examination:  General: No acute distress  HEENT: Atraumatic, Normocephalic  Respiratory: CTA B/l.  No crackles, rhonchi, or wheezes.  Cardiovascular: RRR.  Normal S1 & S2.  Normal b/l radial and pedal pulses.    Neurological Examination:  General / Mental Status: AAO x 3.  No aphasia or dysarthria.  Naming and repetition intact.  Cranial Nerves: VFF x 4.  PERRL.  EOMI x 2, No nystagmus or diplopia.  B/l V1-V3 equal and intact to light touch and pinprick.  Symmetric facial movement and palate elevation.  B/l hearing equal to finger rub.  5/5 strength with b/l sternocleidomastoid and trapezius.  Midline tongue protrusion, with no atrophy or fasciculations.  Motor: Normal bulk & tone in all four extremities.  5/5 strength throughout all four extremities.  No downward drift, rigidity, spasticity, or tremors in any of the four extremities.  Sensory: Intact to light touch and pinprick in all four extremities.  Negative Romberg.  Reflex: 2+ and symmetric at b/l biceps, triceps, brachioradialis, patellae, and ankles.  Downgoing toes b/l.  Coordination: No dysmetria with b/l finger-to-nose and heel raise tests.  Symmetric rapid alternating movements b/l.  Gait: Normal, narrow-based gait.  No difficulty with tiptoe, heel, and tandem gaits.          LABORATORY VALUES:                          17.0   6.55  )-----------( 119      ( 22 Mar 2024 06:23 )             50.8       03-22    140  |  105  |  27<H>  ----------------------------<  112<H>  3.6   |  29  |  1.05    Ca    8.6      22 Mar 2024 06:23  Phos  2.7     03-22  Mg     1.7     03-22    TPro  5.8<L>  /  Alb  2.4<L>  /  TBili  1.0  /  DBili  0.5<H>  /  AST  299<H>  /  ALT  498<H>  /  AlkPhos  80  03-22    Glucose Trend  03-22-24 @ 11:25   -  -- -- 190<H>  03-22-24 @ 07:33   -  -- -- 113<H>  03-22-24 @ 06:23   -  112<H> -- --  03-21-24 @ 21:18   -  -- -- 122<H>  03-21-24 @ 16:40   -  -- -- 150<H>  03-21-24 @ 11:14   -  -- -- 121<H>  03-21-24 @ 08:37   -  -- -- 86  03-21-24 @ 05:04   -  89 -- --  03-20-24 @ 21:11   -  -- -- 80  03-20-24 @ 16:31   -  -- -- 90    Vitamin B12, Serum: 1814 pg/mL (03-21-24 @ 05:04)  Folate, Serum: 15.5 ng/mL (03-21-24 @ 05:04)          NEUROIMAGING:          Please contact the Neurology consult service with any neurological questions.      Jad Milligan MD   of Neurology  [facility] at [facility]         NEUROLOGY FOLLOW-UP NOTE    NAME:  FARHAT ROMO      ASSESSMENT:  78 RHM with likely aggravation of mild cognitive impairment vs. subacute necrotizing encephalopathy in the setting of recent COVID-19 infection      RECOMMENDATIONS:    - Vitamin B12, Folate, and TSH levels are unremarkable - No supplementation needed    - Follow up on COVID-19 infection status, and provide supportive care as needed    - MRI Brain deferred due to presence of PPM/AICD    - Outpatient followed for neurocognitive evaluation and potential PET-FDG CT Brain for evaluation for cerebral hypometabolism    - DVT ppx: SCDs, Warfarin          ******************************    HPI:  Pt is a 79 yo M from home, ambulates with cane, with CAD x5 stents on warfarin and Plavix, cardiac arrest in 2014, CHF (EF 30%), PPM/AICD, paroxysmal AF, CKD3, chronic vertigo, DM2, chronic Starr, recurrent UTI, hypothyroid, hx mitral valve clipping, "bladder spot" requiring further o/p workup, presenting s/p fall backwards. Pt is a limited narrator. He says that he has a long history of vertigo, and that he got out of the shower, was at the sink, and got dizzy, lost his balance, when he fell backwards and hit back of his head against shower door. He denies any loc, convulsions, tongue biting, loss of bowel/bladder control. He called his wife for help. He says that he has had a long history of vertigo, says nothing has helped him, but that Epley maneuver is mildly effective. He says he bumps into things a lot because of the vertigo, and that he has bruises on his arms from this. He says that he has had longstanding rhinorrhea. He does also endorse that he tested positive for COVID-19 on 3/8.  Denies nausea, vomiting, diarrhea, abdominal pain, SOB, chest pain, SAVAGE, palpitations, headache, cough, wheezing, joint pain or swelling, fever, chills.  (18 Mar 2024 23:08)      NEURO HPI:  78 RHM who presents after falling backwards and hitting the back of his head against his shower door after falling. He has a known history of mild cognitive impairment with memory loss, and recently had a COVID-19 infection.      INTERVAL HISTORY:  The patient has not had any new neurological deficits overnight.      MEDICATIONS:  acetaminophen     Tablet .. 650 milliGRAM(s) Oral every 6 hours PRN  benzonatate 100 milliGRAM(s) Oral three times a day PRN  brimonidine 0.2% Ophthalmic Solution 1 Drop(s) Both EYES at bedtime  clopidogrel Tablet 75 milliGRAM(s) Oral at bedtime  furosemide    Tablet 40 milliGRAM(s) Oral daily  guaiFENesin Oral Liquid (Sugar-Free) 100 milliGRAM(s) Oral every 6 hours PRN  insulin glargine Injectable (LANTUS) 12 Unit(s) SubCutaneous every morning  insulin lispro (ADMELOG) corrective regimen sliding scale   SubCutaneous three times a day before meals  insulin lispro (ADMELOG) corrective regimen sliding scale   SubCutaneous at bedtime  latanoprost 0.005% Ophthalmic Solution 1 Drop(s) Both EYES at bedtime  levothyroxine 100 MICROGram(s) Oral daily  meclizine 25 milliGRAM(s) Oral three times a day  melatonin 3 milliGRAM(s) Oral at bedtime PRN  metoprolol succinate ER 12.5 milliGRAM(s) Oral daily  ondansetron Injectable 4 milliGRAM(s) IV Push every 8 hours PRN  spironolactone 25 milliGRAM(s) Oral daily  warfarin 2 milliGRAM(s) Oral once      ALLERGIES:  No Known Allergies      REVIEW OF SYSTEMS:  Fourteen systems reviewed and negative except as in HPI / Interval History.          OBJECTIVE:  Vital Signs Last 24 Hrs  T(C): 36.5 (22 Mar 2024 11:12), Max: 36.8 (22 Mar 2024 07:46)  T(F): 97.7 (22 Mar 2024 11:12), Max: 98.2 (22 Mar 2024 07:46)  HR: 56 (22 Mar 2024 11:12) (52 - 78)  BP: 102/55 (22 Mar 2024 11:12) (102/55 - 118/65)  RR: 19 (22 Mar 2024 11:12) (17 - 19)  SpO2: 96% (22 Mar 2024 11:12) (94% - 96%)  Parameters below as of 22 Mar 2024 11:12  Patient On (Oxygen Delivery Method): nasal cannula  O2 Flow (L/min): 2      General Examination:  General: No acute distress  HEENT: Atraumatic, Normocephalic  Respiratory: CTA B/l.  No crackles, rhonchi, or wheezes.  Cardiovascular: Low heart rate, Regular rhythm.  Normal b/l radial and pedal pulses.    Neurological Examination:  General / Mental Status: AAO x 2 (knows year, but not month, date, or day of week).  No aphasia or dysarthria.  Immediate recall: 3/3 ("Apple, Table, Nicki"), 5-minute delayed recall: 0/3 (recalls 1 word with category cue and 1 word with MCQ cue; cannot recall the third word, "Nicki," with either category or MCQ cue).  Cranial Nerves: B/l blink to threat present.  PERRL.  EOMI x 2, No nystagmus or diplopia.  B/l V1-V3 equal and intact to light touch and pinprick.  Symmetric facial movement and palate elevation.  B/l hearing equal to finger rub.  At least 4/5 strength with b/l sternocleidomastoid and trapezius.  Midline tongue protrusion, with no atrophy or fasciculations.  Motor: Normal bulk & tone in all four extremities.  At least 4/5 strength throughout all four extremities.  No downward drift, rigidity, spasticity, or tremors in any of the four extremities.  Sensory: Intact to light touch and pinprick in all four extremities.  Reflex: 2+ and symmetric at b/l biceps and patellae.  1+ and symmetric at b/l triceps, brachioradialis, and ankles.  Downgoing toes b/l.  Coordination: No dysmetria with b/l finger-to-nose and heel raise tests.  Gait and Romberg sign testing deferred per patient preference.          LABORATORY VALUES:                          17.0   6.55  )-----------( 119      ( 22 Mar 2024 06:23 )             50.8       03-22    140  |  105  |  27<H>  ----------------------------<  112<H>  3.6   |  29  |  1.05    Ca    8.6      22 Mar 2024 06:23  Phos  2.7     03-22  Mg     1.7     03-22    TPro  5.8<L>  /  Alb  2.4<L>  /  TBili  1.0  /  DBili  0.5<H>  /  AST  299<H>  /  ALT  498<H>  /  AlkPhos  80  03-22    Glucose Trend  03-22-24 @ 11:25   -  -- -- 190<H>  03-22-24 @ 07:33   -  -- -- 113<H>  03-22-24 @ 06:23   -  112<H> -- --  03-21-24 @ 21:18   -  -- -- 122<H>  03-21-24 @ 16:40   -  -- -- 150<H>  03-21-24 @ 11:14   -  -- -- 121<H>  03-21-24 @ 08:37   -  -- -- 86  03-21-24 @ 05:04   -  89 -- --  03-20-24 @ 21:11   -  -- -- 80  03-20-24 @ 16:31   -  -- -- 90    Vitamin B12, Serum: 1814 pg/mL (03-21-24 @ 05:04)  Folate, Serum: 15.5 ng/mL (03-21-24 @ 05:04)    Thyroid Stimulating Hormone, Serum: 0.74 uU/mL (03.21.24 @ 05:04)           NEUROIMAGING:      CT Head & CT Cervical Spine (3/18/24):  - No acute intracranial abnormality  - Diffuse cerebral atrophy  - Chronic multilevel cervical spine degenerative changes without fracture      Echo (3/21/24):  - Mild mitral regurgitation  - No cardiac thrombus or intracardiac shunt          Please contact the Neurology consult service with any neurological questions.      Jad Milligan MD   of Neurology  [facility] at [facility]

## 2024-03-22 NOTE — DIETITIAN INITIAL EVALUATION ADULT - PROBLEM SELECTOR PLAN 1
U/a pos  CVA tenderness on R  Pt asymptomatic   WBC WNL  As per surescripts he has had multiple abx rx within the last few months -- suspect for UTI    -CTX  -f/u cx  -frequent UTI is also a contraindication for farxiga, unclear why patient is still on it

## 2024-03-22 NOTE — PROGRESS NOTE ADULT - SUBJECTIVE AND OBJECTIVE BOX
PGY-1 Progress Note discussed with attending    PAGER #: [[phone]] TILL 5:00 PM  PLEASE CONTACT ON CALL TEAM:  - On Call Team (Please refer to Primo) FROM 5:00 PM - 8:30PM  - Nightfloat Team FROM 8:30 -7:30 AM    CC: Patient is a 78y old  Male who presents with a chief complaint of UTI, fall (22 Mar 2024 11:17)      OVERNIGHT EVENTS:    SUBJECTIVE / INTERVAL HPI: Patient seen and examined at bedside.     ROS:  CONSTITUTIONAL: No weakness, fevers or chills  EYES/ENT: No visual changes;  +vertigo, no tinnitus, no ear fullness, no throat pain   NECK: No pain or stiffness  RESPIRATORY: +cough, no wheezing, hemoptysis; No shortness of breath  CARDIOVASCULAR: No chest pain or palpitations  GASTROINTESTINAL: No abdominal or epigastric pain. No nausea, vomiting, or hematemesis; No diarrhea or constipation. No melena or hematochezia.  GENITOURINARY: No dysuria, frequency or hematuria  NEUROLOGICAL: No numbness or weakness  SKIN: No itching, burning, rashes, or lesions     VITAL SIGNS:  Vital Signs Last 24 Hrs  T(C): 36.5 (22 Mar 2024 11:12), Max: 36.8 (22 Mar 2024 07:46)  T(F): 97.7 (22 Mar 2024 11:12), Max: 98.2 (22 Mar 2024 07:46)  HR: 56 (22 Mar 2024 11:12) (52 - 78)  BP: 102/55 (22 Mar 2024 11:12) (102/55 - 118/65)  BP(mean): --  RR: 19 (22 Mar 2024 11:12) (17 - 19)  SpO2: 96% (22 Mar 2024 11:12) (93% - 96%)    Parameters below as of 22 Mar 2024 11:12  Patient On (Oxygen Delivery Method): nasal cannula  O2 Flow (L/min): 2      PHYSICAL EXAM:    General: WDWN, no acute distress  HEENT: NC/AT; PERRL, anicteric sclera; MMM  Neck: supple  Cardiovascular: +S1/S2; RRR  Respiratory: mild bibasilar rales; no Wheezing  Gastrointestinal: soft, NT/mildly distended; +BSx4, no RUQ tenderness  Extremities: WWP; no edema, clubbing or cyanosis  Vascular: 2+ radial, DP/PT pulses B/L  Skin: Warm, dry, good turgor, no rashes, extensive ecchymoses over forearms and hands  Neurological: AAOx3; CNII-XII grossly intact, right horizontal gaze evoked nystagmus, preserved and equal strength in all extremities    MEDICATIONS:  MEDICATIONS  (STANDING):  brimonidine 0.2% Ophthalmic Solution 1 Drop(s) Both EYES at bedtime  carvedilol 3.125 milliGRAM(s) Oral every 12 hours  clopidogrel Tablet 75 milliGRAM(s) Oral at bedtime  furosemide    Tablet 40 milliGRAM(s) Oral daily  insulin glargine Injectable (LANTUS) 12 Unit(s) SubCutaneous every morning  insulin lispro (ADMELOG) corrective regimen sliding scale   SubCutaneous three times a day before meals  insulin lispro (ADMELOG) corrective regimen sliding scale   SubCutaneous at bedtime  latanoprost 0.005% Ophthalmic Solution 1 Drop(s) Both EYES at bedtime  levothyroxine 100 MICROGram(s) Oral daily  meclizine 25 milliGRAM(s) Oral three times a day  spironolactone 25 milliGRAM(s) Oral daily    MEDICATIONS  (PRN):  acetaminophen     Tablet .. 650 milliGRAM(s) Oral every 6 hours PRN Temp greater or equal to 38C (100.4F), Mild Pain (1 - 3)  benzonatate 100 milliGRAM(s) Oral three times a day PRN Cough  guaiFENesin Oral Liquid (Sugar-Free) 100 milliGRAM(s) Oral every 6 hours PRN Cough  melatonin 3 milliGRAM(s) Oral at bedtime PRN Insomnia  ondansetron Injectable 4 milliGRAM(s) IV Push every 8 hours PRN Nausea and/or Vomiting      ALLERGIES:  Allergies    No Known Allergies    Intolerances        LABS:                        17.0   6.55  )-----------( 119      ( 22 Mar 2024 06:23 )             50.8     03-22    140  |  105  |  27<H>  ----------------------------<  112<H>  3.6   |  29  |  1.05    Ca    8.6      22 Mar 2024 06:23  Phos  2.7     03-22  Mg     1.7     03-22    TPro  5.8<L>  /  Alb  2.4<L>  /  TBili  1.0  /  DBili  0.5<H>  /  AST  299<H>  /  ALT  498<H>  /  AlkPhos  80  03-22    PT/INR - ( 22 Mar 2024 06:23 )   PT: 30.2 sec;   INR: 2.73 ratio         PTT - ( 21 Mar 2024 05:04 )  PTT:40.7 sec  Urinalysis Basic - ( 22 Mar 2024 06:23 )    Color: x / Appearance: x / SG: x / pH: x  Gluc: 112 mg/dL / Ketone: x  / Bili: x / Urobili: x   Blood: x / Protein: x / Nitrite: x   Leuk Esterase: x / RBC: x / WBC x   Sq Epi: x / Non Sq Epi: x / Bacteria: x      CAPILLARY BLOOD GLUCOSE      POCT Blood Glucose.: 190 mg/dL (22 Mar 2024 11:25)      RADIOLOGY & ADDITIONAL TESTS: Reviewed. PGY-1 Progress Note discussed with attending    PAGER #: [[phone]] TILL 5:00 PM  PLEASE CONTACT ON CALL TEAM:  - On Call Team (Please refer to Primo) FROM 5:00 PM - 8:30PM  - Nightfloat Team FROM 8:30 -7:30 AM    CC: Patient is a 78y old  Male who presents with a chief complaint of UTI, fall (22 Mar 2024 11:17)      OVERNIGHT EVENTS: no acute events    SUBJECTIVE / INTERVAL HPI: Patient seen and examined at bedside. Mild worsening of cough today. Concerned about missing amiodarone as he was previously told never to miss a dose.     ROS:  CONSTITUTIONAL: No weakness, fevers or chills  EYES/ENT: No visual changes;  +vertigo, no tinnitus, no ear fullness, no throat pain   NECK: No pain or stiffness  RESPIRATORY: +cough, no wheezing, hemoptysis; No shortness of breath  CARDIOVASCULAR: No chest pain or palpitations  GASTROINTESTINAL: No abdominal or epigastric pain. No nausea, vomiting, or hematemesis; No diarrhea or constipation. No melena or hematochezia.  GENITOURINARY: No dysuria, frequency or hematuria  NEUROLOGICAL: No numbness or weakness  SKIN: No itching, burning, rashes, or lesions     VITAL SIGNS:  Vital Signs Last 24 Hrs  T(C): 36.5 (22 Mar 2024 11:12), Max: 36.8 (22 Mar 2024 07:46)  T(F): 97.7 (22 Mar 2024 11:12), Max: 98.2 (22 Mar 2024 07:46)  HR: 56 (22 Mar 2024 11:12) (52 - 78)  BP: 102/55 (22 Mar 2024 11:12) (102/55 - 118/65)  BP(mean): --  RR: 19 (22 Mar 2024 11:12) (17 - 19)  SpO2: 96% (22 Mar 2024 11:12) (93% - 96%)    Parameters below as of 22 Mar 2024 11:12  Patient On (Oxygen Delivery Method): nasal cannula  O2 Flow (L/min): 2      PHYSICAL EXAM:    General: WDWN, no acute distress  HEENT: NC/AT; PERRL, anicteric sclera; MMM  Neck: supple  Cardiovascular: +S1/S2; RRR  Respiratory: mild bibasilar rales; no Wheezing  Gastrointestinal: soft, NT/mildly distended; +BSx4, no RUQ tenderness  Extremities: WWP; no edema, clubbing or cyanosis  Vascular: 2+ radial, DP/PT pulses B/L  Skin: Warm, dry, good turgor, no rashes, extensive ecchymoses over forearms and hands  Neurological: AAOx3; CNII-XII grossly intact, right horizontal gaze evoked nystagmus, preserved and equal strength in all extremities    MEDICATIONS:  MEDICATIONS  (STANDING):  brimonidine 0.2% Ophthalmic Solution 1 Drop(s) Both EYES at bedtime  carvedilol 3.125 milliGRAM(s) Oral every 12 hours  clopidogrel Tablet 75 milliGRAM(s) Oral at bedtime  furosemide    Tablet 40 milliGRAM(s) Oral daily  insulin glargine Injectable (LANTUS) 12 Unit(s) SubCutaneous every morning  insulin lispro (ADMELOG) corrective regimen sliding scale   SubCutaneous three times a day before meals  insulin lispro (ADMELOG) corrective regimen sliding scale   SubCutaneous at bedtime  latanoprost 0.005% Ophthalmic Solution 1 Drop(s) Both EYES at bedtime  levothyroxine 100 MICROGram(s) Oral daily  meclizine 25 milliGRAM(s) Oral three times a day  spironolactone 25 milliGRAM(s) Oral daily    MEDICATIONS  (PRN):  acetaminophen     Tablet .. 650 milliGRAM(s) Oral every 6 hours PRN Temp greater or equal to 38C (100.4F), Mild Pain (1 - 3)  benzonatate 100 milliGRAM(s) Oral three times a day PRN Cough  guaiFENesin Oral Liquid (Sugar-Free) 100 milliGRAM(s) Oral every 6 hours PRN Cough  melatonin 3 milliGRAM(s) Oral at bedtime PRN Insomnia  ondansetron Injectable 4 milliGRAM(s) IV Push every 8 hours PRN Nausea and/or Vomiting      ALLERGIES:  Allergies    No Known Allergies    Intolerances        LABS:                        17.0   6.55  )-----------( 119      ( 22 Mar 2024 06:23 )             50.8     03-22    140  |  105  |  27<H>  ----------------------------<  112<H>  3.6   |  29  |  1.05    Ca    8.6      22 Mar 2024 06:23  Phos  2.7     03-22  Mg     1.7     03-22    TPro  5.8<L>  /  Alb  2.4<L>  /  TBili  1.0  /  DBili  0.5<H>  /  AST  299<H>  /  ALT  498<H>  /  AlkPhos  80  03-22    PT/INR - ( 22 Mar 2024 06:23 )   PT: 30.2 sec;   INR: 2.73 ratio         PTT - ( 21 Mar 2024 05:04 )  PTT:40.7 sec  Urinalysis Basic - ( 22 Mar 2024 06:23 )    Color: x / Appearance: x / SG: x / pH: x  Gluc: 112 mg/dL / Ketone: x  / Bili: x / Urobili: x   Blood: x / Protein: x / Nitrite: x   Leuk Esterase: x / RBC: x / WBC x   Sq Epi: x / Non Sq Epi: x / Bacteria: x      CAPILLARY BLOOD GLUCOSE      POCT Blood Glucose.: 190 mg/dL (22 Mar 2024 11:25)      RADIOLOGY & ADDITIONAL TESTS:   < from: TTE W or WO Ultrasound Enhancing Agent (03.21.24 @ 14:40) >  CONCLUSIONS:      1. Percutaneous [id number] mitral repair device with a Mitraclip. Mean transmitral valve gradient 0.7 mmHg at a HR of 57 BPM.   2. Mild mitral regurgitation.   3. Left ventricular endocardium is not well visualized; however, the left ventricular systolic function appears severely reduced.   4. The right ventricle is not well visualized.   5. Device lead is visualized.   6. 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    < end of copied text >

## 2024-03-22 NOTE — DIETITIAN INITIAL EVALUATION ADULT - [id number]_GEN_A_CORE
Pressure Injury 1: Right:, buttocks, Suspected deep tissue injury  Pressure Injury 2: coccyx, Suspected deep tissue injury  Pressure Injury 3: none, none  Pressure Injury 4: none, none  Pressure Injury 5: none, none  Pressure Injury 6: none, none  Pressure Injury 7: none, none  Pressure Injury 8: none, none  Pressure Injury 9: none, none  Pressure Injury 10: none, none  Pressure Injury 11: none, none Pressure Injury 1: Right:, buttocks, Suspected deep tissue injury  Pressure Injury 2: coccyx, Suspected deep tissue injury

## 2024-03-22 NOTE — DIETITIAN INITIAL EVALUATION ADULT - PERTINENT MEDS FT
MEDICATIONS  (STANDING):  brimonidine 0.2% Ophthalmic Solution 1 Drop(s) Both EYES at bedtime  carvedilol 3.125 milliGRAM(s) Oral every 12 hours  clopidogrel Tablet 75 milliGRAM(s) Oral at bedtime  furosemide    Tablet 40 milliGRAM(s) Oral daily  insulin glargine Injectable (LANTUS) 12 Unit(s) SubCutaneous every morning  insulin lispro (ADMELOG) corrective regimen sliding scale   SubCutaneous three times a day before meals  insulin lispro (ADMELOG) corrective regimen sliding scale   SubCutaneous at bedtime  latanoprost 0.005% Ophthalmic Solution 1 Drop(s) Both EYES at bedtime  levothyroxine 100 MICROGram(s) Oral daily  meclizine 25 milliGRAM(s) Oral three times a day  spironolactone 25 milliGRAM(s) Oral daily    MEDICATIONS  (PRN):  acetaminophen     Tablet .. 650 milliGRAM(s) Oral every 6 hours PRN Temp greater or equal to 38C (100.4F), Mild Pain (1 - 3)  benzonatate 100 milliGRAM(s) Oral three times a day PRN Cough  guaiFENesin Oral Liquid (Sugar-Free) 100 milliGRAM(s) Oral every 6 hours PRN Cough  melatonin 3 milliGRAM(s) Oral at bedtime PRN Insomnia  ondansetron Injectable 4 milliGRAM(s) IV Push every 8 hours PRN Nausea and/or Vomiting

## 2024-03-22 NOTE — PROGRESS NOTE ADULT - PROBLEM SELECTOR PLAN 9
hx of DM insulin dependent, also on ozempic and faxiga  -appears to have been on 30 U lantus bid + sliding scale regular insulin???  -A1c 6.9  -last dose of ozempic 3/10 (missed 3/17 dose)   -sugars have been well controlled w/o lantus   - 36U total in last 24 hrs (3/20-3/21)--> decrease insulin to 18U lanus qAM and 3U lispro TID ac w/ SSI hx of DM insulin dependent, also on ozempic and faxiga  -appears to have been on 30 U lantus bid + sliding scale regular insulin???  -A1c 6.9  -last dose of ozempic 3/10 (missed 3/17 dose)   -sugars have been well controlled w/o lantus   - 36U total in last 24 hrs (3/20-3/21)--> d/c premeal and decrease lantus   - c/w12U lantus qAM  and SSI

## 2024-03-22 NOTE — DIETITIAN INITIAL EVALUATION ADULT - NAME AND PHONE
CT Calcium Risk Screening Questions:    1. Patient's age is over 28years old? 53year old (If not, the patient must consult their primary care or cardiology physician for a referral)      2. Have you been previously diagnosed with heart disease, a history of angioplasty, stenting of the heart, or coronary artery bypass surgery? NO (If yes, refer the patient back to their primary care or cardiology provider and do not proceed with scheduling. This test may not be appropriate for patients already diagnosed with heart disease because the test is a screening for the presence or absence of the disease.)    3. Do you have at least [facility] of the following risk factors: (check all that apply)  [] Men over age 39  [] Women over age 54  [] Obesity  [] Physically inactive (less than 30-60 minutes of exercise each week)  [] Diabetes  [x] Family history of coronary artery disease or stroke  [x] High cholesterol  [] Depression  [] High blood pressure  [] Smoker    *If any of the below applies, âdo notâ schedule the test and ask the patient to consult their primary care physician or cardiologist for a referral.  Â· Patient is under 28  â¢ Is currently pregnant, or think they might be pregnant, or is currently breastfeeding. Â· Has been previously diagnosed with heart disease  Â· Doesnât meet at least two of the risk factors  Â· Has a pacemaker    Terms and conditions: (Read through with patient)  Â· I understand this is NOT a diagnostic examination or therapy session  Â· I understand that heart scans use a type of X-ray and therefore exposure to radiation   Â· I understand the price of the test is $49. This is self-pay only and will not be billed to my insurance company  â¢ If your Primary Care Physician:  o Is not an [facility] physician, it is your responsibility to inform him/her of your test results. Is an [address] [place] [address] physician, and you would like us to send results, we will do so.  Otherwise, you may discuss the findings with the Primary Care physician at your discretion. Confirm PCP: Hemalatha Deutsch    Patient met Criteria?  YES RD business card/Food&Nutrition # given for contact as needed; Pt to be followed by dietitian at skilled nursing facility when medically ready to be discharged

## 2024-03-22 NOTE — PROGRESS NOTE ADULT - PROBLEM SELECTOR PLAN 5
p/w hbg 17.3, Hct 50.8, normal MCV  -repeat hbg downtrend to 15.9 then back up to 17.5-->17.7  -total iron 24, % saturation 11, Ferritin 1623-->1594    -f/u serum EPO   -possibly 2ndry PCV, patient has LUCIA but excellent compliance with nocturnal CPAP per wife p/w hbg 17.3, Hct 50.8, normal MCV  -repeat hbg downtrend to 15.9 then back up to 17.5-->17.7  -total iron 24, % saturation 11, Ferritin 1623-->1594    -possibly 2ndry PCV, patient has LUCIA but excellent compliance with nocturnal CPAP per wife  -serum EPO  -family mentioned possible familial presence of hemochromatosis (more likely to be related to cirrhosis than acute rise in LFTs)  -f/u hemochromatosis gene sequencing     -f/u transferrin

## 2024-03-22 NOTE — DIETITIAN INITIAL EVALUATION ADULT - SIGNS/SYMPTOMS
decreased intake x ?duration; Pressure Injury: DTI x 2; willing to try oral nutritional supplement  decreased intake x ?duration; Pressure Injury: DTI x 2; 26-50% intake at time per Flowsheet

## 2024-03-22 NOTE — PROGRESS NOTE ADULT - SUBJECTIVE AND OBJECTIVE BOX
Overnight Events: NAEON, pt feels well without CP or SOB.    Telemetry: sinus mat    Review Of Systems: No chest pain, shortness of breath, or palpitations  CONSTITUTIONAL: no fevers or chills  EYES/ENT: No visual changes;  No vertigo or throat pain   NECK: No pain or stiffness  RESPIRATORY: No cough, wheezing. No shortness of breath  CARDIOVASCULAR: No chest pain or palpitations  GASTROINTESTINAL: No abdominal or epigastric pain. No nausea, vomiting. No diarrhea. No melena.  GENITOURINARY: No dysuria, frequency or hematuria  NEUROLOGICAL: No numbness or weakness  SKIN: No itching, burning, rashes, or lesions   All other review of systems is negative unless indicated above.     Current Meds:  acetaminophen     Tablet .. 650 milliGRAM(s) Oral every 6 hours PRN  benzonatate 100 milliGRAM(s) Oral three times a day PRN  brimonidine 0.2% Ophthalmic Solution 1 Drop(s) Both EYES at bedtime  carvedilol 3.125 milliGRAM(s) Oral every 12 hours  clopidogrel Tablet 75 milliGRAM(s) Oral at bedtime  furosemide    Tablet 40 milliGRAM(s) Oral daily  guaiFENesin Oral Liquid (Sugar-Free) 100 milliGRAM(s) Oral every 6 hours PRN  insulin glargine Injectable (LANTUS) 12 Unit(s) SubCutaneous every morning  insulin lispro (ADMELOG) corrective regimen sliding scale   SubCutaneous three times a day before meals  insulin lispro (ADMELOG) corrective regimen sliding scale   SubCutaneous at bedtime  latanoprost 0.005% Ophthalmic Solution 1 Drop(s) Both EYES at bedtime  levothyroxine 100 MICROGram(s) Oral daily  meclizine 25 milliGRAM(s) Oral three times a day  melatonin 3 milliGRAM(s) Oral at bedtime PRN  ondansetron Injectable 4 milliGRAM(s) IV Push every 8 hours PRN  spironolactone 25 milliGRAM(s) Oral daily    Vitals:  T(F): 97.7 (03-22), Max: 98.2 (03-22)  HR: 56 (03-22) (52 - 78)  BP: 102/55 (03-22) (102/55 - 118/65)  RR: 19 (03-22)  SpO2: 96% (03-22)  I&O's Summary    21 Mar 2024 07:01  -  22 Mar 2024 07:00  --------------------------------------------------------  IN: 200 mL / OUT: 975 mL / NET: -775 mL    Physical Exam:  General: No acute distress  HEENT: EOMI  Neck:  No JVD  Lungs: Clear to auscultation bilaterally; No rales or wheezing  Heart: Regular rate and rhythm; No murmurs, rubs, or gallops  Abdomen: soft, non tender, non distended   Extremities: warm, no edema   Nervous system:  Alert & Oriented X3  Psychiatric: Normal affect  Skin: No rashes or lesions                          17.0   6.55  )-----------( 119      ( 22 Mar 2024 06:23 )             50.8     03-22    140  |  105  |  27<H>  ----------------------------<  112<H>  3.6   |  29  |  1.05    Ca    8.6      22 Mar 2024 06:23  Phos  2.7     03-22  Mg     1.7     03-22    TPro  5.8<L>  /  Alb  2.4<L>  /  TBili  1.0  /  DBili  0.5<H>  /  AST  299<H>  /  ALT  498<H>  /  AlkPhos  80  03-22    PT/INR - ( 22 Mar 2024 06:23 )   PT: 30.2 sec;   INR: 2.73 ratio         PTT - ( 21 Mar 2024 05:04 )  PTT:40.7 sec

## 2024-03-22 NOTE — PROGRESS NOTE ADULT - ASSESSMENT
Assessment and Recommendations:  78 year old M with CAD s/p stents and CABG, ischemic cardiomyopathy (EF 30%), history of cardiac arrest 2014 and VT s/p MDT single chamber ICD, severe MR s/p mitral clip in 2023 at [facility], pAF on Coumadin who presents with dizziness and fall.    1) Dizziness and fall  - ICD interrogation did not show significant events. Pt is V-paced less than 1% of the time  - EKG showed likely sinus bradycardia with non-specific IVCD and inferior q wave  - F/U neurology recs as presentation less likely cardiac-related    2) CAD s/p stents and CABG, last LHC in [facility] 12/2023 with patent grafts  3) ischemic cardiomyopathy (EF 30%)  4) history of cardiac arrest 2014 and VT s/p MDT single chamber ICD  5) severe MR s/p mitral clip in 2023  6) pAF on Coumadin  - His proBNP was normal. He is compensated and euvolemic on exam. On Lasix 40mg daily  - GDMT: aldactone 25, coreg 3.125 q12h. Home Farxiga on hold. Would resume Entresto 24-26 BID when SBP > 100 (last BP in clinic was 100/60).  - continue Plavix for CAD  - Continue Coumadin for stroke prevention    7) Transaminitis, improving  - f/u hepatology recs  - Given significant transaminitis, would hold amiodarone and statin at this time.     Case discussed with pt's outpatient cardiologist Dr. Sam Rivera MD ([employer] TEAMS message PREFERRED)  Cardiology Attending  [facility] - Cardiology  [address] [place] [address], [address] Floor, [address], [place] [address]  Office: [phone]    All Cardiology service information can be found 24/7 on [email or web address], password: [email or web address]

## 2024-03-22 NOTE — PROGRESS NOTE ADULT - ASSESSMENT
78y Male w/ Hx of Type 2 DM, HTN, HLD, hypothyroidism, obesity, CAD s/p stent x5 and CABG, c/p cardiac arrest (2014), CHF (LVEF 30%), s/p PPM/AICD, pAfib, mitral valve "clipping", CKD stage 3, recurrent UTIs, indwelling Starr, LUCIA, gastroparesis, abnormal bladder imaging (pending biopsy) and vertigo presented to [facility] ER on 3/18/24 s/p fall, dizziness, w/o LOC, but hitting his head.  On admission he was found to have severe hepatocellular liver enzyme elevation for what hepatology was consulted. Noted to be COVID-19 pos which is reportedly since 3/8 (?).     # Severe hepatocellular liver enzyme elevation w/ normal cholestatic parameters  - Differential includes ischemic vs. infectious vs. drug induced vs. autoimmune vs. other.  - Cannot rule out that he had undocumented hypotension during his fall at  home  - COVID-19 known to be a/w abnormal liver enzymes as well, although usually lower levels.  - AIH triggered by COVID-19 has been reported in few cases  - Denies pain medication use, but given his memory problems, would need to confirm w/ family if he took any OTC medications recently  - Liver w/u so far: BAL neg, Acetaminophen level neg. HCV ab neg. HSV PCR neg.     Rec:  - Continue holding atorvastatin and avoid re-challenge w/ same statin in the future  - Cardiology consult appreciated, holding amiodarone.   - Liver workup so far: Hep A IgM neg, HBsAg neg, HBsAB pos, HCV ab neg/ RNA neg. EBV PCR detectable but level below detection level, likely non specific. CMV PCR neg. HSV 1/2 PCR neg. CHASTITY neg.  - Please, obtain / follow up CPK, HBcAb, Hep E IgM/PCR, SMA, LKM, AMA, Ig panel, Peth, VZV PCRs.   - C/w supportive measures for COVID-19 per primary team  - Will need repeat of ferritin and iron studies later      # Cirrhosis?  - MELD 3.0 - not accurate b/o coumadin  - CPT - not accurate b/o coumadin, but even with that would be max. CPT B  - Cirrhotic appearing liver on imaging and reports Hx of fatty liver. He has metabolic risk factors thus at risk for MASH cirrhosis, but also for cardiac cirrhosis, and being on amiodarone can also lead to cirrhosis over time.   - Unable to accurately assess liver synthetic function, because his INR is affected by being on coumadin, and hypoalbuminemia might be partially nutritional etc.  - No imaging signs of portal hypertension, and his PLT count has been normal even in 12/2023 and thrombocytopenia noted only during this admission, thus uncertain whether it signals CSPH or related to his COVID.   - Elastography not available inpatient and would not be accurate either in setting of severe liver injury / inflammation  - Notably, the acute rise of his liver enzymes is not explained by his cirrhosis alone.  - No overt HE.  - No Ascites.  HANK resolved.   - HCC status: no focal hepatic lesion on CT a/p w/ iv  - EV status: unknown.   - TTE noted: Left ventricular endocardium is not well visualized; however, the left ventricular systolic function appears severely reduced. The right ventricle is not well visualized. A device lead is visualized. Percutaneous [id number] mitral repair device with a Mitraclip.    Rec:  - Follow up CDL workup.   --- So far Hep C neg, Hep B neg.   --- Still get HBcAb total, A1AT, AI workup, ceruloplasmin.   ---Repeat iron studies in outpatient setting. Reportedly has FHx of hemochromatosis, thus agree w/ sending HFE mutation, although his elevated ferritin can be also explained by his recent COVID.  - He can have elastography outpatient once acute issues resolved and liver enzymes improved.  - C/w monitoring LFTs, MELD labs  - Cardiac optimization per cardiology; appreciate input on amiodarone, being held since 3/21  - Avoid hepatotoxic medications, change Tylenol order, limit < 2g (preferred to be avoided for now)      Thank you for consult  Will continue to monitor. Hepatology will return Monday. Please, consult GI on call if change in status, questions, concerns.  D/w primary team

## 2024-03-22 NOTE — DIETITIAN INITIAL EVALUATION ADULT - OTHER INFO
Pt lives home with family PTA, alert, oriented, well-communicated; Reported decreased intake x ? duration, unsure recent wt changes, denied GI distress, chewing or swallowing problem; Unknown food allergy, no specific food choices; 26-50% intake at time per Flowsheet, willing to try oral nutritional supplement; h/o DM, IuzL0I=0.9, finger stick range=22 to 150; Pending discharge planning to skilled nursing facility for rehab when medically ready per team

## 2024-03-22 NOTE — PROGRESS NOTE ADULT - ASSESSMENT
77 yo M from home, ambulates with cane, with CAD x5 stents on warfarin and plavix, CHF (unk EF), PPM/AICD, chronic vertigo, DM2, chronic castellano, recurrent UTI, "bladder spot" requiring further o/p workup, presenting s/p fall backwards, admitted with UTI iso chronic castellano, mechanical fall, and covid.

## 2024-03-22 NOTE — PROGRESS NOTE ADULT - SUBJECTIVE AND OBJECTIVE BOX
Chief Complaint:  Patient is a 78y old  Male who presents with a chief complaint of UTI, fall (22 Mar 2024 11:55)      Reason for consult:    Interval Events:     Hospital Medications:  acetaminophen     Tablet .. 650 milliGRAM(s) Oral every 6 hours PRN  benzonatate 100 milliGRAM(s) Oral three times a day PRN  brimonidine 0.2% Ophthalmic Solution 1 Drop(s) Both EYES at bedtime  clopidogrel Tablet 75 milliGRAM(s) Oral at bedtime  furosemide    Tablet 40 milliGRAM(s) Oral daily  guaiFENesin Oral Liquid (Sugar-Free) 100 milliGRAM(s) Oral every 6 hours PRN  insulin glargine Injectable (LANTUS) 12 Unit(s) SubCutaneous every morning  insulin lispro (ADMELOG) corrective regimen sliding scale   SubCutaneous three times a day before meals  insulin lispro (ADMELOG) corrective regimen sliding scale   SubCutaneous at bedtime  latanoprost 0.005% Ophthalmic Solution 1 Drop(s) Both EYES at bedtime  levothyroxine 100 MICROGram(s) Oral daily  meclizine 25 milliGRAM(s) Oral three times a day  melatonin 3 milliGRAM(s) Oral at bedtime PRN  metoprolol succinate ER 12.5 milliGRAM(s) Oral daily  ondansetron Injectable 4 milliGRAM(s) IV Push every 8 hours PRN  spironolactone 25 milliGRAM(s) Oral daily  warfarin 2 milliGRAM(s) Oral once      ROS:   General:  No  fevers, chills, night sweats, fatigue  Eyes:  Good vision, no reported pain  ENT:  No sore throat, pain, runny nose  CV:  No pain, palpitations  Pulm:  No dyspnea, cough  GI:  See HPI, otherwise negative  :  No  incontinence, nocturia  Muscle:  No pain, weakness  Neuro:  No memory problems  Psych:  No insomnia, mood problems, depression  Endocrine:  No polyuria, polydipsia, cold/heat intolerance  Heme:  No petechiae, ecchymosis, easy bruisability  Skin:  No rash    PHYSICAL EXAM:   Vital Signs:  Vital Signs Last 24 Hrs  T(C): 36.5 (22 Mar 2024 11:12), Max: 36.8 (22 Mar 2024 07:46)  T(F): 97.7 (22 Mar 2024 11:12), Max: 98.2 (22 Mar 2024 07:46)  HR: 56 (22 Mar 2024 11:12) (52 - 78)  BP: 102/55 (22 Mar 2024 11:12) (102/55 - 118/65)  BP(mean): --  RR: 19 (22 Mar 2024 11:12) (17 - 19)  SpO2: 96% (22 Mar 2024 11:12) (94% - 96%)    Parameters below as of 22 Mar 2024 11:12  Patient On (Oxygen Delivery Method): nasal cannula  O2 Flow (L/min): 2    Daily     Daily     GENERAL: no acute distress  NEURO: alert, no asterixis  HEENT: anicteric sclera, no conjunctival pallor appreciated  CHEST: no respiratory distress, no accessory muscle use  CARDIAC: regular rate, rhythm  ABDOMEN: soft, non-tender, non-distended, no rebound or guarding  EXTREMITIES: warm, well perfused, no edema  SKIN: no lesions noted    LABS: reviewed                        17.0   6.55  )-----------( 119      ( 22 Mar 2024 06:23 )             50.8     03-22    140  |  105  |  27<H>  ----------------------------<  112<H>  3.6   |  29  |  1.05    Ca    8.6      22 Mar 2024 06:23  Phos  2.7     03-22  Mg     1.7     03-22    TPro  5.8<L>  /  Alb  2.4<L>  /  TBili  1.0  /  DBili  0.5<H>  /  AST  299<H>  /  ALT  498<H>  /  AlkPhos  80  03-22    LIVER FUNCTIONS - ( 22 Mar 2024 06:23 )  Alb: 2.4 g/dL / Pro: 5.8 g/dL / ALK PHOS: 80 U/L / ALT: 498 U/L DA / AST: 299 U/L / GGT: x             Interval Diagnostic Studies: see sunrise for full report   Chief Complaint:  Patient is a 78y old  Male who presents with a chief complaint of UTI, fall (22 Mar 2024 11:55)      Reason for consult: Elevated liver enzymes    Interval Events: Patient was seen and examined at the bedside. No new complaints. Liver enzymes improving.    Hospital Medications:  acetaminophen     Tablet .. 650 milliGRAM(s) Oral every 6 hours PRN  benzonatate 100 milliGRAM(s) Oral three times a day PRN  brimonidine 0.2% Ophthalmic Solution 1 Drop(s) Both EYES at bedtime  clopidogrel Tablet 75 milliGRAM(s) Oral at bedtime  furosemide    Tablet 40 milliGRAM(s) Oral daily  guaiFENesin Oral Liquid (Sugar-Free) 100 milliGRAM(s) Oral every 6 hours PRN  insulin glargine Injectable (LANTUS) 12 Unit(s) SubCutaneous every morning  insulin lispro (ADMELOG) corrective regimen sliding scale   SubCutaneous three times a day before meals  insulin lispro (ADMELOG) corrective regimen sliding scale   SubCutaneous at bedtime  latanoprost 0.005% Ophthalmic Solution 1 Drop(s) Both EYES at bedtime  levothyroxine 100 MICROGram(s) Oral daily  meclizine 25 milliGRAM(s) Oral three times a day  melatonin 3 milliGRAM(s) Oral at bedtime PRN  metoprolol succinate ER 12.5 milliGRAM(s) Oral daily  ondansetron Injectable 4 milliGRAM(s) IV Push every 8 hours PRN  spironolactone 25 milliGRAM(s) Oral daily  warfarin 2 milliGRAM(s) Oral once      ROS:   General:  No  fevers, chills, night sweats, fatigue  Eyes:  Good vision, no reported pain  ENT:  No sore throat, pain, runny nose  CV:  No pain, palpitations  Pulm:  No dyspnea, cough  GI:  See HPI, otherwise negative  :  No  dysuria  Muscle:  No pain, weakness  Neuro:  No memory problems  Skin:  No rash      PHYSICAL EXAM:   Vital Signs:  Vital Signs Last 24 Hrs  T(C): 36.5 (22 Mar 2024 11:12), Max: 36.8 (22 Mar 2024 07:46)  T(F): 97.7 (22 Mar 2024 11:12), Max: 98.2 (22 Mar 2024 07:46)  HR: 56 (22 Mar 2024 11:12) (52 - 78)  BP: 102/55 (22 Mar 2024 11:12) (102/55 - 118/65)  BP(mean): --  RR: 19 (22 Mar 2024 11:12) (17 - 19)  SpO2: 96% (22 Mar 2024 11:12) (94% - 96%)    Parameters below as of 22 Mar 2024 11:12  Patient On (Oxygen Delivery Method): nasal cannula  O2 Flow (L/min): 2    Daily     Daily     GENERAL: no acute distress  NEURO: AAOx3, no asterixis  HEENT: anicteric sclera, no conjunctival pallor appreciated  CHEST: no respiratory distress, no accessory muscle use  CARDIAC: regular rate, rhythm  ABDOMEN: soft, obese, mild epigastric tenderness, no rebound or guarding, BS+  EXTREMITIES: warm, well perfused, no edema  SKIN: no rash    LABS: reviewed                        17.0   6.55  )-----------( 119      ( 22 Mar 2024 06:23 )             50.8     03-22    140  |  105  |  27<H>  ----------------------------<  112<H>  3.6   |  29  |  1.05    Ca    8.6      22 Mar 2024 06:23  Phos  2.7     03-22  Mg     1.7     03-22    TPro  5.8<L>  /  Alb  2.4<L>  /  TBili  1.0  /  DBili  0.5<H>  /  AST  299<H>  /  ALT  498<H>  /  AlkPhos  80  03-22    LIVER FUNCTIONS - ( 22 Mar 2024 06:23 )  Alb: 2.4 g/dL / Pro: 5.8 g/dL / ALK PHOS: 80 U/L / ALT: 498 U/L DA / AST: 299 U/L / GGT: x             Interval Diagnostic Studies: see sunrise for full report

## 2024-03-22 NOTE — PROGRESS NOTE ADULT - PROBLEM SELECTOR PLAN 10
cont coreg, diuretics, cont metoprolol, entresto, spironolactone, and lasix  -monitor for hypotension

## 2024-03-22 NOTE — DIETITIAN INITIAL EVALUATION ADULT - PERTINENT LABORATORY DATA
03-22    140  |  105  |  27<H>  ----------------------------<  112<H>  3.6   |  29  |  1.05    Ca    8.6      22 Mar 2024 06:23  Phos  2.7     03-22  Mg     1.7     03-22    TPro  5.8<L>  /  Alb  2.4<L>  /  TBili  1.0  /  DBili  0.5<H>  /  AST  299<H>  /  ALT  498<H>  /  AlkPhos  80  03-22  POCT Blood Glucose.: 190 mg/dL (03-22-24 @ 11:25)  A1C with Estimated Average Glucose Result: 6.9 % (03-19-24 @ 04:55)

## 2024-03-22 NOTE — PROGRESS NOTE ADULT - PROBLEM SELECTOR PLAN 3
p/w ,  significantly elevated from baseline  -CT abdomen shows hepatic cirrhosis morphology  -ASA/acetaminophen levels normal  -Hepatology Dr. Lazcano consulted  -f/u Hep A IgM, HBsAg, HBcAb, HBsAb, HBcAb IgM, HCV RNA, Hep E IgM/PCR, CHASTITY, SMA, Ig panel, ferritin, iron/TIBC, salicylate level, Utox, Peth, EBV, CMV, HSV PCRs  -LFTs downtrending AST//649 Dbili 0.6 today  -Pending RUQ US  -Outpatient elastography  -hold statin  -likely will hold amiodarone also in s/o bradycardia, pending collateral from outpt. cardio Dr. Gilbert Shipman p/w ,  significantly elevated from baseline  -CT abdomen shows hepatic cirrhosis morphology  -ASA/acetaminophen levels normal  -Hepatology Dr. Lazcano consulted  -f/u Hep A IgM, HBsAg, HBcAb, HBsAb, HBcAb IgM, HCV RNA, Hep E IgM/PCR, CHASTITY, SMA, Ig panel, ferritin, iron/TIBC, salicylate level, Utox, Peth, EBV, CMV, HSV PCRs  -LFTs downtrending AST//498 Dbili 0.5 today  -Pending RUQ US  -Outpatient elastography  -hold statin  -hold amiodarone also in s/o bradycardia (has PPM), pending collateral from outpt. cardio Dr. Gilbert Shipman

## 2024-03-22 NOTE — DIETITIAN INITIAL EVALUATION ADULT - NSFNSGIIOFT_GEN_A_CORE
DSY=198 lb   Wt data in EMR: 186 lb 3/18/24; 176.3 lb 3/20/24; 181.6 lb 3/21/24  (a bit fluctuated, may due to scale/fluid variance, diuretic Rx)

## 2024-03-22 NOTE — PROGRESS NOTE ADULT - PROBLEM SELECTOR PLAN 6
hx of recurrent UTIs w/ chronic castellano, p/w R sided CVA tenderness  -UA positove  -Currently asymptomatic   -WBC WNL  -continue with ceftriaxone 1g IV for 3 days  -f/u Urine culture  -frequent UTI is also a contraindication for farxiga, unclear why patient is still on it hx of recurrent UTIs w/ chronic castellano, p/w R sided CVA tenderness  -UA positove  -Currently asymptomatic   -WBC WNL  -continue with ceftriaxone 1g IV for 3 days  -frequent UTI is also a contraindication for farxiga, unclear why patient is still on it  -patient supposed to have castellano exchanged on 3/25 as outpatient  -exchange castellano while inpatient

## 2024-03-22 NOTE — DIETITIAN INITIAL EVALUATION ADULT - NS FNS DIET ORDER
Diet, Regular:   Consistent Carbohydrate {No Snacks}  DASH/TLC {Sodium & Cholesterol Restricted}  1500mL Fluid Restriction ([id number])  Supplement Feeding Modality:  Oral  Glucerna Shake Cans or Servings Per Day:  1       Frequency:  Two Times a day (03-22-24 @ 11:54)

## 2024-03-22 NOTE — PROGRESS NOTE ADULT - PROBLEM SELECTOR PLAN 1
As per o/p notes in HIE from Dr. Vieira, pt has rapid deterioration in overall neuro function with increased weakness, and apparently he has had decreasing motor skills and cannot dress himself anymore.  -presenting s/p fall while shaving with headstrike on 3/17, likely 2/2 vertigo  -CTH neg for acute pathology  -f/u TTE  -Neurology consulted, Dr. Milligan  -PT consult As per o/p notes in HIE from Dr. Vieira, pt has rapid deterioration in overall neuro function with increased weakness, and apparently he has had decreasing motor skills and cannot dress himself anymore.  -presenting s/p fall while shaving with headstrike on 3/17, likely 2/2 vertigo  -CTH neg for acute pathology  -TTE shows severely reduced ejection fraction and aortic root at the sinuses of Valsalva is dilated,   -Neurology consulted, Dr. Milligan  -PT consulted recommending [facility]

## 2024-03-23 LAB
ALBUMIN SERPL ELPH-MCNC: 2.4 G/DL — LOW (ref 3.5–5)
ALP SERPL-CCNC: 84 U/L — SIGNIFICANT CHANGE UP (ref 40–120)
ALT FLD-CCNC: 387 U/L DA — HIGH (ref 10–60)
ANION GAP SERPL CALC-SCNC: 7 MMOL/L — SIGNIFICANT CHANGE UP (ref 5–17)
AST SERPL-CCNC: 179 U/L — HIGH (ref 10–40)
BASOPHILS # BLD AUTO: 0.03 K/UL — SIGNIFICANT CHANGE UP (ref 0–0.2)
BASOPHILS NFR BLD AUTO: 0.4 % — SIGNIFICANT CHANGE UP (ref 0–2)
BILIRUB DIRECT SERPL-MCNC: 0.6 MG/DL — HIGH (ref 0–0.3)
BILIRUB INDIRECT FLD-MCNC: 0.4 MG/DL — SIGNIFICANT CHANGE UP (ref 0.2–1)
BILIRUB SERPL-MCNC: 1 MG/DL — SIGNIFICANT CHANGE UP (ref 0.2–1.2)
BUN SERPL-MCNC: 29 MG/DL — HIGH (ref 7–18)
CALCIUM SERPL-MCNC: 8.6 MG/DL — SIGNIFICANT CHANGE UP (ref 8.4–10.5)
CHLORIDE SERPL-SCNC: 105 MMOL/L — SIGNIFICANT CHANGE UP (ref 96–108)
CO2 SERPL-SCNC: 25 MMOL/L — SIGNIFICANT CHANGE UP (ref 22–31)
CREAT SERPL-MCNC: 0.83 MG/DL — SIGNIFICANT CHANGE UP (ref 0.5–1.3)
CULTURE RESULTS: SIGNIFICANT CHANGE UP
EGFR: 90 ML/MIN/1.73M2 — SIGNIFICANT CHANGE UP
EOSINOPHIL # BLD AUTO: 0.14 K/UL — SIGNIFICANT CHANGE UP (ref 0–0.5)
EOSINOPHIL NFR BLD AUTO: 1.9 % — SIGNIFICANT CHANGE UP (ref 0–6)
GLUCOSE BLDC GLUCOMTR-MCNC: 106 MG/DL — HIGH (ref 70–99)
GLUCOSE BLDC GLUCOMTR-MCNC: 143 MG/DL — HIGH (ref 70–99)
GLUCOSE BLDC GLUCOMTR-MCNC: 150 MG/DL — HIGH (ref 70–99)
GLUCOSE BLDC GLUCOMTR-MCNC: 179 MG/DL — HIGH (ref 70–99)
GLUCOSE SERPL-MCNC: 126 MG/DL — HIGH (ref 70–99)
HCT VFR BLD CALC: 50.5 % — HIGH (ref 39–50)
HGB BLD-MCNC: 17.1 G/DL — HIGH (ref 13–17)
IMM GRANULOCYTES NFR BLD AUTO: 0.9 % — SIGNIFICANT CHANGE UP (ref 0–0.9)
INR BLD: 2.94 RATIO — HIGH (ref 0.85–1.18)
LYMPHOCYTES # BLD AUTO: 1.65 K/UL — SIGNIFICANT CHANGE UP (ref 1–3.3)
LYMPHOCYTES # BLD AUTO: 22 % — SIGNIFICANT CHANGE UP (ref 13–44)
MAGNESIUM SERPL-MCNC: 1.8 MG/DL — SIGNIFICANT CHANGE UP (ref 1.6–2.6)
MCHC RBC-ENTMCNC: 32.8 PG — SIGNIFICANT CHANGE UP (ref 27–34)
MCHC RBC-ENTMCNC: 33.9 GM/DL — SIGNIFICANT CHANGE UP (ref 32–36)
MCV RBC AUTO: 96.7 FL — SIGNIFICANT CHANGE UP (ref 80–100)
MONOCYTES # BLD AUTO: 0.82 K/UL — SIGNIFICANT CHANGE UP (ref 0–0.9)
MONOCYTES NFR BLD AUTO: 10.9 % — SIGNIFICANT CHANGE UP (ref 2–14)
NEUTROPHILS # BLD AUTO: 4.78 K/UL — SIGNIFICANT CHANGE UP (ref 1.8–7.4)
NEUTROPHILS NFR BLD AUTO: 63.9 % — SIGNIFICANT CHANGE UP (ref 43–77)
NRBC # BLD: 0 /100 WBCS — SIGNIFICANT CHANGE UP (ref 0–0)
PETH 16:0/18:1: NEGATIVE NG/ML — SIGNIFICANT CHANGE UP
PETH 16:0/18:2: NEGATIVE NG/ML — SIGNIFICANT CHANGE UP
PETH COMMENTS: SIGNIFICANT CHANGE UP
PHOSPHATE SERPL-MCNC: 2.5 MG/DL — SIGNIFICANT CHANGE UP (ref 2.5–4.5)
PLATELET # BLD AUTO: 128 K/UL — LOW (ref 150–400)
POTASSIUM SERPL-MCNC: 3.6 MMOL/L — SIGNIFICANT CHANGE UP (ref 3.5–5.3)
POTASSIUM SERPL-SCNC: 3.6 MMOL/L — SIGNIFICANT CHANGE UP (ref 3.5–5.3)
PROT SERPL-MCNC: 5.9 G/DL — LOW (ref 6–8.3)
PROTHROM AB SERPL-ACNC: 32.5 SEC — HIGH (ref 9.5–13)
RBC # BLD: 5.22 M/UL — SIGNIFICANT CHANGE UP (ref 4.2–5.8)
RBC # FLD: 14.8 % — HIGH (ref 10.3–14.5)
SMOOTH MUSCLE AB SER-ACNC: SIGNIFICANT CHANGE UP
SODIUM SERPL-SCNC: 137 MMOL/L — SIGNIFICANT CHANGE UP (ref 135–145)
SPECIMEN SOURCE: SIGNIFICANT CHANGE UP
TRANSFERRIN SERPL-MCNC: 124 MG/DL — LOW (ref 200–360)
WBC # BLD: 7.49 K/UL — SIGNIFICANT CHANGE UP (ref 3.8–10.5)
WBC # FLD AUTO: 7.49 K/UL — SIGNIFICANT CHANGE UP (ref 3.8–10.5)

## 2024-03-23 PROCEDURE — 99233 SBSQ HOSP IP/OBS HIGH 50: CPT

## 2024-03-23 PROCEDURE — 99232 SBSQ HOSP IP/OBS MODERATE 35: CPT | Mod: GC

## 2024-03-23 PROCEDURE — G0452: CPT | Mod: 26

## 2024-03-23 RX ORDER — POLYETHYLENE GLYCOL 3350 17 G/17G
17 POWDER, FOR SOLUTION ORAL DAILY
Refills: 0 | Status: DISCONTINUED | OUTPATIENT
Start: 2024-03-23 | End: 2024-03-26

## 2024-03-23 RX ORDER — DONEPEZIL HYDROCHLORIDE 10 MG/1
10 TABLET, FILM COATED ORAL AT BEDTIME
Refills: 0 | Status: DISCONTINUED | OUTPATIENT
Start: 2024-03-23 | End: 2024-03-26

## 2024-03-23 RX ORDER — SENNA PLUS 8.6 MG/1
2 TABLET ORAL AT BEDTIME
Refills: 0 | Status: DISCONTINUED | OUTPATIENT
Start: 2024-03-23 | End: 2024-03-26

## 2024-03-23 RX ORDER — LACTULOSE 10 G/15ML
20 SOLUTION ORAL ONCE
Refills: 0 | Status: COMPLETED | OUTPATIENT
Start: 2024-03-23 | End: 2024-03-23

## 2024-03-23 RX ADMIN — SPIRONOLACTONE 25 MILLIGRAM(S): 25 TABLET, FILM COATED ORAL at 05:47

## 2024-03-23 RX ADMIN — Medication 1: at 11:54

## 2024-03-23 RX ADMIN — Medication 100 MILLIGRAM(S): at 21:25

## 2024-03-23 RX ADMIN — Medication 25 MILLIGRAM(S): at 13:08

## 2024-03-23 RX ADMIN — Medication 40 MILLIGRAM(S): at 05:50

## 2024-03-23 RX ADMIN — POLYETHYLENE GLYCOL 3350 17 GRAM(S): 17 POWDER, FOR SOLUTION ORAL at 14:06

## 2024-03-23 RX ADMIN — BRIMONIDINE TARTRATE 1 DROP(S): 2 SOLUTION/ DROPS OPHTHALMIC at 21:23

## 2024-03-23 RX ADMIN — Medication 100 MILLIGRAM(S): at 12:02

## 2024-03-23 RX ADMIN — Medication 100 MICROGRAM(S): at 05:47

## 2024-03-23 RX ADMIN — CLOPIDOGREL BISULFATE 75 MILLIGRAM(S): 75 TABLET, FILM COATED ORAL at 21:21

## 2024-03-23 RX ADMIN — Medication 25 MILLIGRAM(S): at 21:21

## 2024-03-23 RX ADMIN — LATANOPROST 1 DROP(S): 0.05 SOLUTION/ DROPS OPHTHALMIC; TOPICAL at 21:22

## 2024-03-23 RX ADMIN — Medication 12.5 MILLIGRAM(S): at 05:48

## 2024-03-23 RX ADMIN — Medication 25 MILLIGRAM(S): at 05:48

## 2024-03-23 RX ADMIN — DONEPEZIL HYDROCHLORIDE 10 MILLIGRAM(S): 10 TABLET, FILM COATED ORAL at 21:22

## 2024-03-23 RX ADMIN — LACTULOSE 20 GRAM(S): 10 SOLUTION ORAL at 17:39

## 2024-03-23 RX ADMIN — Medication 100 MILLIGRAM(S): at 05:50

## 2024-03-23 RX ADMIN — SENNA PLUS 2 TABLET(S): 8.6 TABLET ORAL at 21:22

## 2024-03-23 RX ADMIN — INSULIN GLARGINE 12 UNIT(S): 100 INJECTION, SOLUTION SUBCUTANEOUS at 08:25

## 2024-03-23 NOTE — PROGRESS NOTE ADULT - PROBLEM SELECTOR PLAN 9
hx of DM insulin dependent, also on ozempic and faxiga  -appears to have been on 30 U lantus bid + sliding scale regular insulin???  -A1c 6.9  -last dose of ozempic 3/10 (missed 3/17 dose)   -sugars have been well controlled w/o lantus   - 36U total in last 24 hrs (3/20-3/21)--> d/c premeal and decrease lantus   - c/w12U lantus qAM  and SSI

## 2024-03-23 NOTE — PROGRESS NOTE ADULT - ATTENDING COMMENTS
77 yo M from home, ambulates with cane, with CAD x5 stents on warfarin and plavix, cardiac arrest in 2014, CHF (EF 30%), PPM/AICD, paroxysmal AF, CKD3, chronic vertigo, DM2, chronic Starr, recurrent UTI, hypothyroid, hx mitral valve clipping, "bladder spot" requiring further o/p workup, presenting s/p fall backwards. Patient reports that he has had almost 10 falls in last one year- all being mechanical in nature. He denies any LOC.    Patient was evaluated, denies any new complaints. He is slow in his responses and not a good historian. Spoke w/ patient's niece Ms. Jones who informed that patient has some memory impairment but has dramatically gotten worse in last one month. He was not able to give himself insulin, couldn't recall how many units he need and has impaired coordination which is not his baseline. He was seen by his PCP who recommended a CT head at that time to r/o a brain bleed. She also informed that patient has some liver abnormality but was lost to f/u     #Recurrent falls- mechanical  #Memory impairment/ cognitive dysfunction  #Transaminitis- unclear etiology   #Covid- asymptomatic    #Chronic vertigo   #CAD s/p stents   #Chronic AF  #Chronic systolic HF s/p AICD  #Chronic Starr   #DM    -Patient p/w recurrent falls- no LOC, appear to be mechanical in nature w/ chronic vertigo further increasing his risk.    - PT rec [facility] and patient and family agreeable  -Pt is on AC and in light of recurrent falls, need to address risks vs benefits. Will reach out to his primary cardiologist Dr. Gilbert Shipman.    - Will consult cardiology in house as patient on longstanding amiodarone, here with bradycarida and occasional pauses. Also possibility of amiodarone toxicity for liver    - Will hold amiodarone for now and TTE ordered  -Pt's cognitive impairment could be secondary to progressive dementia, however, per family changes are quite drastic. Neurology eval requested- Dr. Milligan  -LFTs noted to be quite elevated on admission, per family he had some abnormality before but did not f/u. CT w/ evidence of cirrhosis. Hepatitis panel testing. US abd pending.  Appreciate hepatology recs.   -Pt w/ chronic Afib, c/w warfarin, carvedilol. Daily PT/INR   -C/w Lasix, spironolactone, carvedilol, resume Entresto w/ holding parameters once BP stable. Clinically euvolemic.    - AICD interrogation negative  -c/w Plavix and stain given CAD hx  -C/w Starr cathter   -C/w insulin  -Out-patient urology f/u for bx bladder lesion- Family is aware and out-patient w/u is in progress.   -Patient tested positive for Covid on 03/08, family to bring reports. - Now off isolation
77 yo M from home, ambulates with cane, with CAD x5 stents on warfarin and plavix, cardiac arrest in 2014, CHF (EF 30%), PPM/AICD, paroxysmal AF, CKD3, chronic vertigo, DM2, chronic Starr, recurrent UTI, hypothyroid, hx mitral valve clipping, "bladder spot" requiring further o/p workup, presenting s/p fall backwards. Patient reports that he has had almost 10 falls in last one year- all being mechanical in nature. He denies any LOC.    Patient was evaluated, denies any new complaints. He is slow in his responses and not a good historian. Spoke w/ patient's niece Ms. Jones who informed that patient has some memory impairment but has dramatically gotten worse in last one month. He was not able to give himself insulin, couldn't recall how many units he need and has impaired coordination which is not his baseline. He was seen by his PCP who recommended a CT head at that time to r/o a brain bleed. She also informed that patient has some liver abnormality but was lost to f/u     Labs reviewed- cbc, bmp, LFTs     PE as above     A/P:  #Recurrent falls- mechanical  #Memory impairment/ cognitive dysfunction  #Transaminitis- unclear etiology   #Covid- asymptomatic    #Chronic vertigo   #CAD s/p stents   #Chronic AF  #Chronic systolic HF s/p AICD  #Chronic Starr   #DM    Plan:  -Patient p/w recurrent falls- no LOC, appear to be mechanical in nature w/ chronic vertigo further increasing his risk. PT eval pending, spoke w/ family -leaning towards  [facility].   -Pt is on AC and in light of recurrent falls, need to address risks vs benefits. Will reach out to his primary cardiologist Dr. Gilbert Shipman before making any changes   -Pt's cognitive impairment could be secondary to progressive dementia, however, per family changes are quite drastic. Neurology eval requested- Dr. Milligan   -LFTs noted to be quite elevated on admission, per family he had some abnormality before but did not f/u. CT w/ evidence of cirrhosis. Hepatitis panel testing. US abd pending.  Appreciate hepatology recs.   -Pt w/ chronic Afib, c/w warfarin, carvedilol. Daily PT/INR   -C/w Lasix, spironolactone, carvedilol, resume Entresto w/ holding parameters once BP stable- ran low this am. Clinically euvolemic.  Needs AICD interrogation   -c/w Plavix and stain given CAD hx  -C/w Starr cathter   -C/w insulin  -Out-patient urology f/u for bx bladder lesion- Family is aware and out-patient w/u is in progress.   -Patient tested positive for Covid on 03/08, family to bring reports. Will reach out to infection control to take him off isolation once reports available.   -PT eval pending
77 yo M from home, ambulates with cane, with CAD x5 stents on warfarin and plavix, cardiac arrest in 2014, CHF (EF 30%), PPM/AICD, paroxysmal AF, CKD3, chronic vertigo, DM2, chronic Starr, recurrent UTI, hypothyroid, hx mitral valve clipping, "bladder spot" requiring further o/p workup, presenting s/p fall backwards. Patient reports that he has had almost 10 falls in last one year- all being mechanical in nature. He denies any LOC.    Pt was evaluated this am, no new complaints. LFTs are down trending. His BP dropped this am, suspect orthostatic hypotension as patient was just put in chair and BP checked right after.     PE as above     Labs reviewed- cbc, bmp     #Recurrent falls- mechanical  #Memory impairment/ cognitive dysfunction  #Transaminitis- unclear etiology   #Covid- asymptomatic    #Chronic vertigo   #CAD s/p stents   #Chronic AF  #Chronic systolic HF s/p AICD  #Chronic Starr   #DM    -Patient p/w recurrent falls- no LOC, appear to be mechanical in nature w/ chronic vertigo further increasing his risk.    - PT rec [facility] and patient and family agreeable, choices pending   -Pt is on AC and in light of recurrent falls, need to address risks vs benefits. Will reach out to his primary cardiologist Dr. Gilbert Shipman.   -Pt's cognitive impairment could be secondary to progressive dementia, however, per family changes are quite drastic. Appreciate neurology recs, recommended Outpatient followed for neurocognitive evaluation and potential PET-FDG CT Brain for evaluation for cerebral hypometabolism. MR deferred due to AICD   -LFTs down trending, could be multifactorial. So far w/u unrevealing.   -Pt w/ chronic Afib, c/w carvedilol. Daily PT/INR - INR 2.9, will hold coumadin today.   -C/w Lasix, spironolactone, carvedilol, c/w Entresto w/ holding parameters once BP stable. Clinically euvolemic. AICD interrogation w/ no events. DC tele.   Appreciate cardio recs.   -c/w Plavix and stain given CAD hx  -C/w Starr cathter - chronic   -C/w insulin  -Out-patient urology f/u for bx bladder lesion- Family is aware and out-patient w/u is in progress.   -Off isolation of Covid as patient tested positive on 03/08.   -DC plan to [facility]

## 2024-03-23 NOTE — PROGRESS NOTE ADULT - PROBLEM SELECTOR PLAN 4
hx of HFrEF with last EF 30% as per o/p documentation  -CT chest w/ multifocal GGOs, pulmonary edema vs covid  -currently euvolemic appearing on exam aside from mild rales, not in exacerbation  -cont home lasix, spironolactone, coreg  -restart Entresto per cardiology recs (monitor for hypotension  -switch carvedilol to metoprolol for less anti-hypertensive effect  -Cardiology Dr. Rivera consulted

## 2024-03-23 NOTE — PROGRESS NOTE ADULT - PROBLEM SELECTOR PLAN 5
p/w hbg 17.3, Hct 50.8, normal MCV  -repeat hbg downtrend to 15.9 then back up to 17.5-->17.7  -total iron 24, % saturation 11, Ferritin 1623-->1594    -possibly 2ndry PCV, patient has LUCIA but excellent compliance with nocturnal CPAP per wife  -serum EPO  -family mentioned possible familial presence of hemochromatosis (more likely to be related to cirrhosis than acute rise in LFTs)  -f/u hemochromatosis gene sequencing     -f/u transferrin p/w hbg 17.3, Hct 50.8, normal MCV  -repeat hbg downtrend to 15.9 then back up to 17.5-->17.7  -total iron 24, % saturation 11, Ferritin 1623-->1594, transferrin 124    -possibly 2ndry PCV, patient has LUCIA but excellent compliance with nocturnal CPAP per wife  -serum EPO WNL  -family mentioned possible familial presence of hemochromatosis (more likely to be related to cirrhosis than acute rise in LFTs)--->on f/u questioning patient's wife's son has hemochromatosis but is not blood related  -f/u hemochromatosis gene sequencing     -will consider JAK2 sequencing

## 2024-03-23 NOTE — PROGRESS NOTE ADULT - PROBLEM SELECTOR PLAN 6
hx of recurrent UTIs w/ chronic castellano, p/w R sided CVA tenderness  -UA positove  -Currently asymptomatic   -WBC WNL  -continue with ceftriaxone 1g IV for 3 days  -frequent UTI is also a contraindication for farxiga, unclear why patient is still on it  -patient supposed to have castellano exchanged on 3/25 as outpatient  -exchange castellano while inpatient

## 2024-03-23 NOTE — PROGRESS NOTE ADULT - PROBLEM SELECTOR PLAN 1
As per o/p notes in HIE from Dr. Vieira, pt has rapid deterioration in overall neuro function with increased weakness, and apparently he has had decreasing motor skills and cannot dress himself anymore.  -presenting s/p fall while shaving with headstrike on 3/17, likely 2/2 vertigo  -CTH neg for acute pathology  -TTE shows severely reduced ejection fraction and aortic root at the sinuses of Valsalva is dilated,   -Neurology consulted, Dr. Milligan  -PT consulted recommending [facility]

## 2024-03-23 NOTE — PROGRESS NOTE ADULT - SUBJECTIVE AND OBJECTIVE BOX
PGY-1 Progress Note discussed with attending    PAGER #: [[phone]] TILL 5:00 PM  PLEASE CONTACT ON CALL TEAM:  - On Call Team (Please refer to Primo) FROM 5:00 PM - 8:30PM  - Nightfloat Team FROM 8:30 -7:30 AM    CC: Patient is a 78y old  Male who presents with a chief complaint of UTI, fall (23 Mar 2024 11:19)      OVERNIGHT EVENTS: no acute events    SUBJECTIVE / INTERVAL HPI: Patient seen and examined at bedside. No acute complaints. Still having dry cough. Reminded patient of PRN tessalon perles. Reports no BMs in a week however unsure if patient is reliable historian due to mild cognitive decline. Denies abdominal pain or bloating. Denies any further episodes of vertigo. Denies shortness of breath, chest pain, or palpitations. Wants to get out of bed and into chair today.     ROS:  CONSTITUTIONAL: +weakness, fevers or chills  EYES/ENT: No visual changes;  No vertigo or throat pain   NECK: No pain or stiffness  RESPIRATORY: +cough, no wheezing, hemoptysis; No shortness of breath  CARDIOVASCULAR: No chest pain or palpitations  GASTROINTESTINAL: No abdominal or epigastric pain. No nausea, vomiting, or hematemesis; No diarrhea, +constipation. No melena or hematochezia.  GENITOURINARY: No dysuria, frequency or hematuria  NEUROLOGICAL: No numbness or weakness  SKIN: No itching, burning, rashes, or lesions     VITAL SIGNS:  Vital Signs Last 24 Hrs  T(C): 36.3 (23 Mar 2024 11:08), Max: 36.8 (22 Mar 2024 20:24)  T(F): 97.3 (23 Mar 2024 11:08), Max: 98.2 (22 Mar 2024 20:24)  HR: 54 (23 Mar 2024 11:44) (50 - 82)  BP: 122/62 (23 Mar 2024 11:44) (84/48 - 123/73)  BP(mean): --  RR: 18 (23 Mar 2024 11:08) (18 - 19)  SpO2: 95% (23 Mar 2024 11:08) (92% - 96%)    Parameters below as of 23 Mar 2024 11:08  Patient On (Oxygen Delivery Method): nasal cannula  O2 Flow (L/min): 2      PHYSICAL EXAM:    General: WDWN, NAD  HEENT: NC/AT; PERRL, anicteric sclera; MMM  Neck: supple  Cardiovascular: +S1/S2; bradycardic, regular rhythm  Respiratory: CTA B/L; no W/R/R  Gastrointestinal: soft, NT/ND; +BSx4  Extremities: WWP; no edema, clubbing or cyanosis  Vascular: 2+ radial, DP/PT pulses B/L  Skin: Warm, dry, good turgor, no rashes, or ecchymoses  Neurological: AAOx3; no focal deficits    MEDICATIONS:  MEDICATIONS  (STANDING):  brimonidine 0.2% Ophthalmic Solution 1 Drop(s) Both EYES at bedtime  clopidogrel Tablet 75 milliGRAM(s) Oral at bedtime  donepezil 10 milliGRAM(s) Oral at bedtime  furosemide    Tablet 40 milliGRAM(s) Oral daily  insulin glargine Injectable (LANTUS) 12 Unit(s) SubCutaneous every morning  insulin lispro (ADMELOG) corrective regimen sliding scale   SubCutaneous three times a day before meals  insulin lispro (ADMELOG) corrective regimen sliding scale   SubCutaneous at bedtime  latanoprost 0.005% Ophthalmic Solution 1 Drop(s) Both EYES at bedtime  levothyroxine 100 MICROGram(s) Oral daily  meclizine 25 milliGRAM(s) Oral three times a day  metoprolol succinate ER 12.5 milliGRAM(s) Oral daily  sacubitril 24 mG/valsartan 26 mG 1 Tablet(s) Oral two times a day  spironolactone 25 milliGRAM(s) Oral daily    MEDICATIONS  (PRN):  acetaminophen     Tablet .. 650 milliGRAM(s) Oral every 6 hours PRN Temp greater or equal to 38C (100.4F), Mild Pain (1 - 3)  benzonatate 100 milliGRAM(s) Oral three times a day PRN Cough  guaiFENesin Oral Liquid (Sugar-Free) 100 milliGRAM(s) Oral every 6 hours PRN Cough  melatonin 3 milliGRAM(s) Oral at bedtime PRN Insomnia  ondansetron Injectable 4 milliGRAM(s) IV Push every 8 hours PRN Nausea and/or Vomiting      ALLERGIES:  Allergies    No Known Allergies    Intolerances        LABS:                        17.1   7.49  )-----------( 128      ( 23 Mar 2024 06:31 )             50.5     03-23    137  |  105  |  29<H>  ----------------------------<  126<H>  3.6   |  25  |  0.83    Ca    8.6      23 Mar 2024 06:31  Phos  2.5     03-23  Mg     1.8     03-23    TPro  5.9<L>  /  Alb  2.4<L>  /  TBili  1.0  /  DBili  0.6<H>  /  AST  179<H>  /  ALT  387<H>  /  AlkPhos  84  03-23    PT/INR - ( 23 Mar 2024 06:31 )   PT: 32.5 sec;   INR: 2.94 ratio           Urinalysis Basic - ( 23 Mar 2024 06:31 )    Color: x / Appearance: x / SG: x / pH: x  Gluc: 126 mg/dL / Ketone: x  / Bili: x / Urobili: x   Blood: x / Protein: x / Nitrite: x   Leuk Esterase: x / RBC: x / WBC x   Sq Epi: x / Non Sq Epi: x / Bacteria: x      CAPILLARY BLOOD GLUCOSE      POCT Blood Glucose.: 179 mg/dL (23 Mar 2024 11:37)      RADIOLOGY & ADDITIONAL TESTS: Reviewed.

## 2024-03-23 NOTE — PROGRESS NOTE ADULT - SUBJECTIVE AND OBJECTIVE BOX
Overnight Events: NAEON. Pt feels well this AM    Telemetry: sinus mat 50s    Review Of Systems: No chest pain, shortness of breath, or palpitations  CONSTITUTIONAL: no fevers or chills  EYES/ENT: No visual changes;  No vertigo or throat pain   NECK: No pain or stiffness  RESPIRATORY: No cough, wheezing. No shortness of breath  CARDIOVASCULAR: No chest pain or palpitations  GASTROINTESTINAL: No abdominal or epigastric pain. No nausea, vomiting. No diarrhea. No melena.  GENITOURINARY: No dysuria, frequency or hematuria  NEUROLOGICAL: No numbness or weakness  SKIN: No itching, burning, rashes, or lesions   All other review of systems is negative unless indicated above.     Current Meds:  acetaminophen     Tablet .. 650 milliGRAM(s) Oral every 6 hours PRN  benzonatate 100 milliGRAM(s) Oral three times a day PRN  brimonidine 0.2% Ophthalmic Solution 1 Drop(s) Both EYES at bedtime  clopidogrel Tablet 75 milliGRAM(s) Oral at bedtime  furosemide    Tablet 40 milliGRAM(s) Oral daily  guaiFENesin Oral Liquid (Sugar-Free) 100 milliGRAM(s) Oral every 6 hours PRN  insulin glargine Injectable (LANTUS) 12 Unit(s) SubCutaneous every morning  insulin lispro (ADMELOG) corrective regimen sliding scale   SubCutaneous three times a day before meals  insulin lispro (ADMELOG) corrective regimen sliding scale   SubCutaneous at bedtime  latanoprost 0.005% Ophthalmic Solution 1 Drop(s) Both EYES at bedtime  levothyroxine 100 MICROGram(s) Oral daily  meclizine 25 milliGRAM(s) Oral three times a day  melatonin 3 milliGRAM(s) Oral at bedtime PRN  metoprolol succinate ER 12.5 milliGRAM(s) Oral daily  ondansetron Injectable 4 milliGRAM(s) IV Push every 8 hours PRN  sacubitril 24 mG/valsartan 26 mG 1 Tablet(s) Oral two times a day  spironolactone 25 milliGRAM(s) Oral daily    Vitals:  T(F): 97.3 (03-23), Max: 98.2 (03-22)  HR: 56 (03-23) (50 - 82)  BP: 84/48 (03-23) (84/48 - 123/73)  RR: 18 (03-23)  SpO2: 95% (03-23)  I&O's Summary    22 Mar 2024 07:01  -  23 Mar 2024 07:00  --------------------------------------------------------  IN: 0 mL / OUT: 600 mL / NET: -600 mL    Physical Exam:  General: No acute distress  HEENT: EOMI  Neck:  No JVD  Lungs: Clear to auscultation bilaterally; No rales or wheezing  Heart: Regular rate and rhythm; No murmurs, rubs, or gallops  Abdomen: soft, non tender, non distended   Extremities: warm, no edema   Nervous system:  Alert & Oriented X3  Psychiatric: Normal affect  Skin: No rashes or lesions                          17.1   7.49  )-----------( 128      ( 23 Mar 2024 06:31 )             50.5     03-23    137  |  105  |  29<H>  ----------------------------<  126<H>  3.6   |  25  |  0.83    Ca    8.6      23 Mar 2024 06:31  Phos  2.5     03-23  Mg     1.8     03-23    TPro  5.9<L>  /  Alb  2.4<L>  /  TBili  1.0  /  DBili  0.6<H>  /  AST  179<H>  /  ALT  387<H>  /  AlkPhos  84  03-23    PT/INR - ( 23 Mar 2024 06:31 )   PT: 32.5 sec;   INR: 2.94 ratio      Echo:_______________________________________________________________________________________     CONCLUSIONS:      1. Percutaneous [id number] mitral repair device with a Mitraclip. Mean transmitral valve gradient 0.7 mmHg at a HR of 57 BPM.   2. Mild mitral regurgitation.   3. Left ventricular endocardium is not well visualized; however, the left ventricular systolic function appears severely reduced.   4. The right ventricle is not well visualized.   5. Device lead is visualized.   6. 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    Imaging:

## 2024-03-23 NOTE — PROGRESS NOTE ADULT - PROBLEM SELECTOR PLAN 3
p/w ,  significantly elevated from baseline  -CT abdomen shows hepatic cirrhosis morphology  -ASA/acetaminophen levels normal  -Hepatology Dr. Lazcano consulted  -f/u Hep A IgM, HBsAg, HBcAb, HBsAb, HBcAb IgM, HCV RNA, Hep E IgM/PCR, CHASTITY, SMA, Ig panel, ferritin, iron/TIBC, salicylate level, Utox, Peth, EBV, CMV, HSV PCRs  -LFTs downtrending AST//498 Dbili 0.5 today  -Pending RUQ US  -Outpatient elastography  -hold statin  -hold amiodarone also in s/o bradycardia (has PPM), pending collateral from outpt. cardio Dr. Gilbert Shipman p/w ,  significantly elevated from baseline  -CT abdomen shows hepatic cirrhosis morphology  -ASA/acetaminophen levels normal  -Hepatology Dr. Lazcano consulted  -f/u Hep A IgM, HBsAg, HBcAb, HBsAb, HBcAb IgM, HCV RNA, Hep E IgM/PCR, CHASTITY, SMA, Ig panel, ferritin, iron/TIBC, salicylate level, Utox, Peth, EBV, CMV, HSV PCRs  -LFTs downtrending AST//498 Dbili 0.5 today  -Pending RUQ US  -Outpatient elastography  -hold statin  -hold amiodarone also in s/o bradycardia (has PPM), pending collateral from outpt. cardio Dr. Gilbert Shipman  -f/u hemochromatosis sequencing

## 2024-03-23 NOTE — PROGRESS NOTE ADULT - ASSESSMENT
Assessment and Recommendations:  78 year old M with CAD s/p stents and CABG, ischemic cardiomyopathy (EF 30%), history of cardiac arrest 2014 and VT s/p MDT single chamber ICD, severe MR s/p mitral clip in 2023 at [facility], pAF on Coumadin who presents with dizziness and fall.    1) Dizziness and fall  - ICD interrogation did not show significant events. Pt is V-paced less than 1% of the time  - EKG showed likely sinus bradycardia with non-specific IVCD and inferior q wave  - F/U neurology recs as presentation less likely cardiac-related    2) CAD s/p stents and CABG, last LHC in [facility] 12/2023 with patent grafts  3) ischemic cardiomyopathy (EF 30%)  4) history of cardiac arrest 2014 and VT s/p MDT single chamber ICD  5) severe MR s/p mitral clip in 2023  6) pAF on Coumadin  - His proBNP was normal. He is compensated and euvolemic on exam. On Lasix 40mg daily  - GDMT: aldactone 25, coreg 3.125 q12h. Home Farxiga on hold. Resume Entresto 24-26 BID with holding parameters of SBP less than 100 (last BP in clinic was 100/60).  - Continue Plavix for CAD  - Continue Coumadin for stroke prevention    7) Transaminitis, improving  - f/u hepatology recs  - Given significant transaminitis, would hold amiodarone and statin at this time.     Case discussed with pt's outpatient cardiologist Dr. Sam Rivera MD ([employer] TEAMS message PREFERRED)  Cardiology Attending  [facility] - Cardiology  [phone] [place] [address], [address] Floor, [address], [place] [address]  Office: [phone]    All Cardiology service information can be found 24/7 on [email or web address], password: [email or web address]

## 2024-03-24 LAB
ALBUMIN SERPL ELPH-MCNC: 2.2 G/DL — LOW (ref 3.5–5)
ALP SERPL-CCNC: 81 U/L — SIGNIFICANT CHANGE UP (ref 40–120)
ALT FLD-CCNC: 304 U/L DA — HIGH (ref 10–60)
ANION GAP SERPL CALC-SCNC: 7 MMOL/L — SIGNIFICANT CHANGE UP (ref 5–17)
AST SERPL-CCNC: 129 U/L — HIGH (ref 10–40)
BASOPHILS # BLD AUTO: 0.04 K/UL — SIGNIFICANT CHANGE UP (ref 0–0.2)
BASOPHILS NFR BLD AUTO: 0.5 % — SIGNIFICANT CHANGE UP (ref 0–2)
BILIRUB DIRECT SERPL-MCNC: 0.6 MG/DL — HIGH (ref 0–0.3)
BILIRUB INDIRECT FLD-MCNC: 0.5 MG/DL — SIGNIFICANT CHANGE UP (ref 0.2–1)
BILIRUB SERPL-MCNC: 1.1 MG/DL — SIGNIFICANT CHANGE UP (ref 0.2–1.2)
BUN SERPL-MCNC: 33 MG/DL — HIGH (ref 7–18)
CALCIUM SERPL-MCNC: 9.1 MG/DL — SIGNIFICANT CHANGE UP (ref 8.4–10.5)
CHLORIDE SERPL-SCNC: 105 MMOL/L — SIGNIFICANT CHANGE UP (ref 96–108)
CO2 SERPL-SCNC: 27 MMOL/L — SIGNIFICANT CHANGE UP (ref 22–31)
CREAT SERPL-MCNC: 1.04 MG/DL — SIGNIFICANT CHANGE UP (ref 0.5–1.3)
EGFR: 74 ML/MIN/1.73M2 — SIGNIFICANT CHANGE UP
EOSINOPHIL # BLD AUTO: 0.19 K/UL — SIGNIFICANT CHANGE UP (ref 0–0.5)
EOSINOPHIL NFR BLD AUTO: 2.3 % — SIGNIFICANT CHANGE UP (ref 0–6)
GLUCOSE BLDC GLUCOMTR-MCNC: 100 MG/DL — HIGH (ref 70–99)
GLUCOSE BLDC GLUCOMTR-MCNC: 126 MG/DL — HIGH (ref 70–99)
GLUCOSE BLDC GLUCOMTR-MCNC: 182 MG/DL — HIGH (ref 70–99)
GLUCOSE BLDC GLUCOMTR-MCNC: 220 MG/DL — HIGH (ref 70–99)
GLUCOSE SERPL-MCNC: 108 MG/DL — HIGH (ref 70–99)
HCT VFR BLD CALC: 49 % — SIGNIFICANT CHANGE UP (ref 39–50)
HEV IGM SER QL: POSITIVE
HGB BLD-MCNC: 16.4 G/DL — SIGNIFICANT CHANGE UP (ref 13–17)
IMM GRANULOCYTES NFR BLD AUTO: 0.5 % — SIGNIFICANT CHANGE UP (ref 0–0.9)
INR BLD: 3.04 RATIO — HIGH (ref 0.85–1.18)
LYMPHOCYTES # BLD AUTO: 1.78 K/UL — SIGNIFICANT CHANGE UP (ref 1–3.3)
LYMPHOCYTES # BLD AUTO: 22 % — SIGNIFICANT CHANGE UP (ref 13–44)
MAGNESIUM SERPL-MCNC: 1.9 MG/DL — SIGNIFICANT CHANGE UP (ref 1.6–2.6)
MCHC RBC-ENTMCNC: 32.3 PG — SIGNIFICANT CHANGE UP (ref 27–34)
MCHC RBC-ENTMCNC: 33.5 GM/DL — SIGNIFICANT CHANGE UP (ref 32–36)
MCV RBC AUTO: 96.6 FL — SIGNIFICANT CHANGE UP (ref 80–100)
MONOCYTES # BLD AUTO: 0.92 K/UL — HIGH (ref 0–0.9)
MONOCYTES NFR BLD AUTO: 11.4 % — SIGNIFICANT CHANGE UP (ref 2–14)
NEUTROPHILS # BLD AUTO: 5.13 K/UL — SIGNIFICANT CHANGE UP (ref 1.8–7.4)
NEUTROPHILS NFR BLD AUTO: 63.3 % — SIGNIFICANT CHANGE UP (ref 43–77)
NRBC # BLD: 0 /100 WBCS — SIGNIFICANT CHANGE UP (ref 0–0)
PHOSPHATE SERPL-MCNC: 2.9 MG/DL — SIGNIFICANT CHANGE UP (ref 2.5–4.5)
PLATELET # BLD AUTO: 136 K/UL — LOW (ref 150–400)
POTASSIUM SERPL-MCNC: 3.7 MMOL/L — SIGNIFICANT CHANGE UP (ref 3.5–5.3)
POTASSIUM SERPL-SCNC: 3.7 MMOL/L — SIGNIFICANT CHANGE UP (ref 3.5–5.3)
PROT SERPL-MCNC: 5.8 G/DL — LOW (ref 6–8.3)
PROTHROM AB SERPL-ACNC: 33.5 SEC — HIGH (ref 9.5–13)
RBC # BLD: 5.07 M/UL — SIGNIFICANT CHANGE UP (ref 4.2–5.8)
RBC # FLD: 15 % — HIGH (ref 10.3–14.5)
SODIUM SERPL-SCNC: 139 MMOL/L — SIGNIFICANT CHANGE UP (ref 135–145)
WBC # BLD: 8.1 K/UL — SIGNIFICANT CHANGE UP (ref 3.8–10.5)
WBC # FLD AUTO: 8.1 K/UL — SIGNIFICANT CHANGE UP (ref 3.8–10.5)

## 2024-03-24 PROCEDURE — 99233 SBSQ HOSP IP/OBS HIGH 50: CPT

## 2024-03-24 PROCEDURE — 99232 SBSQ HOSP IP/OBS MODERATE 35: CPT

## 2024-03-24 RX ADMIN — CLOPIDOGREL BISULFATE 75 MILLIGRAM(S): 75 TABLET, FILM COATED ORAL at 21:23

## 2024-03-24 RX ADMIN — INSULIN GLARGINE 12 UNIT(S): 100 INJECTION, SOLUTION SUBCUTANEOUS at 08:04

## 2024-03-24 RX ADMIN — Medication 100 MILLIGRAM(S): at 14:06

## 2024-03-24 RX ADMIN — Medication 12.5 MILLIGRAM(S): at 06:57

## 2024-03-24 RX ADMIN — SENNA PLUS 2 TABLET(S): 8.6 TABLET ORAL at 21:23

## 2024-03-24 RX ADMIN — Medication 100 MICROGRAM(S): at 06:56

## 2024-03-24 RX ADMIN — LATANOPROST 1 DROP(S): 0.05 SOLUTION/ DROPS OPHTHALMIC; TOPICAL at 21:23

## 2024-03-24 RX ADMIN — Medication 25 MILLIGRAM(S): at 06:56

## 2024-03-24 RX ADMIN — POLYETHYLENE GLYCOL 3350 17 GRAM(S): 17 POWDER, FOR SOLUTION ORAL at 11:58

## 2024-03-24 RX ADMIN — Medication 25 MILLIGRAM(S): at 13:44

## 2024-03-24 RX ADMIN — SPIRONOLACTONE 25 MILLIGRAM(S): 25 TABLET, FILM COATED ORAL at 06:56

## 2024-03-24 RX ADMIN — DONEPEZIL HYDROCHLORIDE 10 MILLIGRAM(S): 10 TABLET, FILM COATED ORAL at 21:23

## 2024-03-24 RX ADMIN — Medication 40 MILLIGRAM(S): at 06:56

## 2024-03-24 RX ADMIN — BRIMONIDINE TARTRATE 1 DROP(S): 2 SOLUTION/ DROPS OPHTHALMIC at 21:23

## 2024-03-24 RX ADMIN — Medication 1: at 11:58

## 2024-03-24 RX ADMIN — Medication 2: at 17:07

## 2024-03-24 RX ADMIN — SACUBITRIL AND VALSARTAN 1 TABLET(S): 24; 26 TABLET, FILM COATED ORAL at 17:06

## 2024-03-24 RX ADMIN — Medication 25 MILLIGRAM(S): at 21:24

## 2024-03-24 NOTE — PROGRESS NOTE ADULT - TIME BILLING
review of EMR , coordination of care, discussion with family and patient, and communication with primary team
I counseled the patient and primary team about the patient's test results, as well as the appropriate steps for management given the patient's presentation with encephalopathy.
review of EMR , coordination of care, discussion with family and patient, and communication with primary team and outpt cardiologist.
chart review, coordinating care w/ family- independent history form niece. Coordinating care w/ consultants

## 2024-03-24 NOTE — PROGRESS NOTE ADULT - SUBJECTIVE AND OBJECTIVE BOX
Patient is a 78y old  Male who presents with a chief complaint of UTI, fall (24 Mar 2024 12:41)      INTERVAL HPI/OVERNIGHT EVENTS: no events noted overnight. Pt feels well. Had BM this afternoon.     MEDICATIONS  (STANDING):  brimonidine 0.2% Ophthalmic Solution 1 Drop(s) Both EYES at bedtime  clopidogrel Tablet 75 milliGRAM(s) Oral at bedtime  donepezil 10 milliGRAM(s) Oral at bedtime  furosemide    Tablet 40 milliGRAM(s) Oral daily  insulin glargine Injectable (LANTUS) 12 Unit(s) SubCutaneous every morning  insulin lispro (ADMELOG) corrective regimen sliding scale   SubCutaneous three times a day before meals  insulin lispro (ADMELOG) corrective regimen sliding scale   SubCutaneous at bedtime  latanoprost 0.005% Ophthalmic Solution 1 Drop(s) Both EYES at bedtime  levothyroxine 100 MICROGram(s) Oral daily  meclizine 25 milliGRAM(s) Oral three times a day  metoprolol succinate ER 12.5 milliGRAM(s) Oral daily  polyethylene glycol 3350 17 Gram(s) Oral daily  sacubitril 24 mG/valsartan 26 mG 1 Tablet(s) Oral two times a day  senna 2 Tablet(s) Oral at bedtime  spironolactone 25 milliGRAM(s) Oral daily    MEDICATIONS  (PRN):  acetaminophen     Tablet .. 650 milliGRAM(s) Oral every 6 hours PRN Temp greater or equal to 38C (100.4F), Mild Pain (1 - 3)  benzonatate 100 milliGRAM(s) Oral three times a day PRN Cough  guaiFENesin Oral Liquid (Sugar-Free) 100 milliGRAM(s) Oral every 6 hours PRN Cough  melatonin 3 milliGRAM(s) Oral at bedtime PRN Insomnia  ondansetron Injectable 4 milliGRAM(s) IV Push every 8 hours PRN Nausea and/or Vomiting      __________________________________________________  REVIEW OF SYSTEMS:    CONSTITUTIONAL: No fever,   EYES: no acute visual disturbances  NECK: No pain or stiffness  RESPIRATORY: No cough; No shortness of breath  CARDIOVASCULAR: No chest pain, no palpitations  GASTROINTESTINAL: No pain. No nausea or vomiting; No diarrhea   NEUROLOGICAL: No headache or numbness, no tremors  MUSCULOSKELETAL: No joint pain, no muscle pain  GENITOURINARY: no dysuria, no frequency, no hesitancy  PSYCHIATRY: no depression , no anxiety  ALL OTHER  ROS negative        Vital Signs Last 24 Hrs  T(C): 36.6 (24 Mar 2024 20:33), Max: 36.6 (24 Mar 2024 20:33)  T(F): 97.8 (24 Mar 2024 20:33), Max: 97.8 (24 Mar 2024 20:33)  HR: 50 (24 Mar 2024 20:33) (50 - 56)  BP: 127/73 (24 Mar 2024 20:33) (106/56 - 127/73)  RR: 18 (24 Mar 2024 20:33) (18 - 18)  SpO2: 99% (24 Mar 2024 20:33) (92% - 99%)    Parameters below as of 24 Mar 2024 20:33  Patient On (Oxygen Delivery Method): nasal cannula  O2 Flow (L/min): 2      ________________________________________________  PHYSICAL EXAM:  GENERAL: NAD  HEENT: Normocephalic;  conjunctivae and sclerae clear; moist mucous membranes;   NECK : supple  CHEST/LUNG: Clear to auscultation bilaterally with good air entry   HEART: S1 S2  regular; no murmurs, gallops or rubs  ABDOMEN: Soft, Nontender, Nondistended; Bowel sounds present  EXTREMITIES: no cyanosis; no edema; no calf tenderness  SKIN: warm and dry; no rash  NERVOUS SYSTEM:  Awake and alert; Oriented  to place, person and time ; no new deficits    _________________________________________________  LABS:                        16.4   8.10  )-----------( 136      ( 24 Mar 2024 05:32 )             49.0     03-24    139  |  105  |  33<H>  ----------------------------<  108<H>  3.7   |  27  |  1.04    Ca    9.1      24 Mar 2024 05:32  Phos  2.9     03-24  Mg     1.9     03-24    TPro  5.8<L>  /  Alb  2.2<L>  /  TBili  1.1  /  DBili  0.6<H>  /  AST  129<H>  /  ALT  304<H>  /  AlkPhos  81  03-24    PT/INR - ( 24 Mar 2024 05:32 )   PT: 33.5 sec;   INR: 3.04 ratio           Urinalysis Basic - ( 24 Mar 2024 05:32 )    Color: x / Appearance: x / SG: x / pH: x  Gluc: 108 mg/dL / Ketone: x  / Bili: x / Urobili: x   Blood: x / Protein: x / Nitrite: x   Leuk Esterase: x / RBC: x / WBC x   Sq Epi: x / Non Sq Epi: x / Bacteria: x      CAPILLARY BLOOD GLUCOSE      POCT Blood Glucose.: 126 mg/dL (24 Mar 2024 21:13)  POCT Blood Glucose.: 220 mg/dL (24 Mar 2024 16:43)  POCT Blood Glucose.: 182 mg/dL (24 Mar 2024 11:45)  POCT Blood Glucose.: 100 mg/dL (24 Mar 2024 07:47)        RADIOLOGY & ADDITIONAL TESTS:    Imaging Personally Reviewed:  YES    Consultant(s) Notes Reviewed:   YES    Care Discussed with Consultants : YES     Plan of care was discussed with patient and /or primary care giver; all questions and concerns were addressed and care was aligned with patient's wishes.

## 2024-03-24 NOTE — PROGRESS NOTE ADULT - ASSESSMENT
Assessment and Recommendations:  78 year old M with CAD s/p stents and CABG, ischemic cardiomyopathy (EF 30%), history of cardiac arrest 2014 and VT s/p MDT single chamber ICD, severe MR s/p mitral clip in 2023 at [facility], pAF on Coumadin who presents with dizziness and fall.    1) Dizziness and fall  - ICD interrogation did not show significant events. Pt is V-paced less than 1% of the time  - EKG showed likely sinus bradycardia with non-specific IVCD and inferior q wave  - F/U neurology recs as presentation less likely cardiac-related    2) CAD s/p stents and CABG, last LHC in [facility] 12/2023 with patent grafts  3) ischemic cardiomyopathy (EF 30%)  4) history of cardiac arrest 2014 and VT s/p MDT single chamber ICD  5) severe MR s/p mitral clip in 2023  6) pAF on Coumadin  - His proBNP was normal. He is compensated and euvolemic on exam. On Lasix 40mg daily  - GDMT: aldactone 25, coreg 3.125 q12h, continue Entresto 24-26 BID with holding parameters of SBP less than 100 (last BP in clinic was 100/60). Home Farxiga on hold  - Continue Plavix for CAD  - Statin on hold as below  - Amiodarone on hold as below, consider restart as outpatient   - Continue Coumadin for stroke prevention    7) Transaminitis, improving  - Given significant transaminitis, amiodarone and statin have been held  - f/u hepatology recs. If LFTs continue to improve, would ask hepatology when it is okay to re-challenge with alternative statin (i.e. rosuvastatin). If patient cannot tolerate statin, may need PCSK9i as outpatient.    Case discussed with pt's outpatient cardiologist Dr. Sam Rivera MD ([employer] TEAMS message PREFERRED)  Cardiology Attending  [facility] - Cardiology  [phone] [place] [address], [address] Floor, [address], [place] [address]  Office: [phone]    All Cardiology service information can be found 24/7 on [email or web address], password: [email or web address]

## 2024-03-24 NOTE — PROGRESS NOTE ADULT - SUBJECTIVE AND OBJECTIVE BOX
Overnight Events: NAEON. Pt feels well    Review Of Systems: No chest pain, shortness of breath, or palpitations  CONSTITUTIONAL: no fevers or chills  EYES/ENT: No visual changes;  No vertigo or throat pain   NECK: No pain or stiffness  RESPIRATORY: No cough, wheezing. No shortness of breath  CARDIOVASCULAR: No chest pain or palpitations  GASTROINTESTINAL: No abdominal or epigastric pain. No nausea, vomiting. No diarrhea. No melena.  GENITOURINARY: No dysuria, frequency or hematuria  NEUROLOGICAL: No numbness or weakness  SKIN: No itching, burning, rashes, or lesions   All other review of systems is negative unless indicated above.     Current Meds:  acetaminophen     Tablet .. 650 milliGRAM(s) Oral every 6 hours PRN  benzonatate 100 milliGRAM(s) Oral three times a day PRN  brimonidine 0.2% Ophthalmic Solution 1 Drop(s) Both EYES at bedtime  clopidogrel Tablet 75 milliGRAM(s) Oral at bedtime  donepezil 10 milliGRAM(s) Oral at bedtime  furosemide    Tablet 40 milliGRAM(s) Oral daily  guaiFENesin Oral Liquid (Sugar-Free) 100 milliGRAM(s) Oral every 6 hours PRN  insulin glargine Injectable (LANTUS) 12 Unit(s) SubCutaneous every morning  insulin lispro (ADMELOG) corrective regimen sliding scale   SubCutaneous three times a day before meals  insulin lispro (ADMELOG) corrective regimen sliding scale   SubCutaneous at bedtime  latanoprost 0.005% Ophthalmic Solution 1 Drop(s) Both EYES at bedtime  levothyroxine 100 MICROGram(s) Oral daily  meclizine 25 milliGRAM(s) Oral three times a day  melatonin 3 milliGRAM(s) Oral at bedtime PRN  metoprolol succinate ER 12.5 milliGRAM(s) Oral daily  ondansetron Injectable 4 milliGRAM(s) IV Push every 8 hours PRN  polyethylene glycol 3350 17 Gram(s) Oral daily  sacubitril 24 mG/valsartan 26 mG 1 Tablet(s) Oral two times a day  senna 2 Tablet(s) Oral at bedtime  spironolactone 25 milliGRAM(s) Oral daily    Vitals:  T(F): 97.2 (03-24), Max: 98.2 (03-23)  HR: 55 (03-24) (51 - 57)  BP: 106/56 (03-24) (106/56 - 131/70)  RR: 18 (03-24)  SpO2: 93% (03-24)  I&O's Summary    23 Mar 2024 07:01  -  24 Mar 2024 07:00  --------------------------------------------------------  IN: 0 mL / OUT: 700 mL / NET: -700 mL      Physical Exam:  General: No acute distress  HEENT: EOMI  Neck:  No JVD  Lungs: Clear to auscultation bilaterally; No rales or wheezing  Heart: Regular rate and rhythm; No murmurs, rubs, or gallops  Abdomen: soft, non tender, non distended   Extremities: warm, no edema   Nervous system:  Alert & Oriented X3  Psychiatric: Normal affect  Skin: No rashes or lesions                          16.4   8.10  )-----------( 136      ( 24 Mar 2024 05:32 )             49.0     03-24    139  |  105  |  33<H>  ----------------------------<  108<H>  3.7   |  27  |  1.04    Ca    9.1      24 Mar 2024 05:32  Phos  2.9     03-24  Mg     1.9     03-24    TPro  5.8<L>  /  Alb  2.2<L>  /  TBili  1.1  /  DBili  0.6<H>  /  AST  129<H>  /  ALT  304<H>  /  AlkPhos  81  03-24    PT/INR - ( 24 Mar 2024 05:32 )   PT: 33.5 sec;   INR: 3.04 ratio           Echo:_______________________________________________________________________________________     CONCLUSIONS:      1. Percutaneous udyo-fm-huwl mitral repair device with a Mitraclip. Mean transmitral valve gradient 0.7 mmHg at a HR of 57 BPM.   2. Mild mitral regurgitation.   3. Left ventricular endocardium is not well visualized; however, the left ventricular systolic function appears severely reduced.   4. The right ventricle is not well visualized.   5. Device lead is visualized.   6. 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

## 2024-03-24 NOTE — PROGRESS NOTE ADULT - ASSESSMENT
79 yo M from home, ambulates with cane, with CAD x5 stents on warfarin and plavix, cardiac arrest in 2014, CHF (EF 30%), PPM/AICD, paroxysmal AF, CKD3, chronic vertigo, DM2, chronic Starr, recurrent UTI, hypothyroid, hx mitral valve clipping, "bladder spot" requiring further o/p workup, presenting s/p fall backwards. Patient reports that he has had almost 10 falls in last one year- all being mechanical in nature. He denies any LOC.        #Recurrent falls- mechanical  #Memory impairment/ cognitive dysfunction  #Transaminitis- unclear etiology   #Covid- asymptomatic    #Chronic vertigo   #CAD s/p stents   #Chronic AF  #Chronic systolic HF s/p AICD  #Chronic Starr   #DM    -Patient p/w recurrent falls- no LOC, appear to be mechanical in nature w/ chronic vertigo further increasing his risk.   -Pt's cognitive impairment could be secondary to progressive dementia, however, per family changes are quite drastic. Appreciate neurology recs, recommended Outpatient followed for neurocognitive evaluation and potential PET-FDG CT Brain for evaluation for cerebral hypometabolism. MR deferred due to AICD.   -LFTs down trending, could be multifactorial. So far w/u unrevealing. US abd pending   -Pt w/ chronic Afib, c/w carvedilol. Daily PT/INR - INR elevated, hold coumadin   -C/w Lasix, spironolactone, carvedilol, c/w Entresto w/ holding parameters once BP stable. Clinically euvolemic. AICD interrogation w/ no events. Off tele.  Appreciate cardio recs.   -c/w Plavix and stain given CAD hx  -C/w Starr cathter - chronic   -C/w insulin  -Out-patient urology f/u for bx bladder lesion- Family is aware and out-patient w/u is in progress.   -Off isolation of Covid as patient tested positive on 03/08.   -DC plan to [facility]. Choices given by family. Spoke w/ patient's wife and updated about his care. Choices given- wants him to go to [facility].

## 2024-03-25 ENCOUNTER — TRANSCRIPTION ENCOUNTER (OUTPATIENT)
Age: 79
End: 2024-03-25

## 2024-03-25 LAB
ALBUMIN SERPL ELPH-MCNC: 2.3 G/DL — LOW (ref 3.5–5)
ALP SERPL-CCNC: 90 U/L — SIGNIFICANT CHANGE UP (ref 40–120)
ALT FLD-CCNC: 245 U/L DA — HIGH (ref 10–60)
ANION GAP SERPL CALC-SCNC: 7 MMOL/L — SIGNIFICANT CHANGE UP (ref 5–17)
AST SERPL-CCNC: 95 U/L — HIGH (ref 10–40)
BASOPHILS # BLD AUTO: 0.03 K/UL — SIGNIFICANT CHANGE UP (ref 0–0.2)
BASOPHILS NFR BLD AUTO: 0.3 % — SIGNIFICANT CHANGE UP (ref 0–2)
BILIRUB DIRECT SERPL-MCNC: 0.6 MG/DL — HIGH (ref 0–0.3)
BILIRUB INDIRECT FLD-MCNC: 0.8 MG/DL — SIGNIFICANT CHANGE UP (ref 0.2–1)
BILIRUB SERPL-MCNC: 1.4 MG/DL — HIGH (ref 0.2–1.2)
BUN SERPL-MCNC: 30 MG/DL — HIGH (ref 7–18)
CALCIUM SERPL-MCNC: 9.2 MG/DL — SIGNIFICANT CHANGE UP (ref 8.4–10.5)
CHLORIDE SERPL-SCNC: 104 MMOL/L — SIGNIFICANT CHANGE UP (ref 96–108)
CO2 SERPL-SCNC: 29 MMOL/L — SIGNIFICANT CHANGE UP (ref 22–31)
CREAT SERPL-MCNC: 1.01 MG/DL — SIGNIFICANT CHANGE UP (ref 0.5–1.3)
EGFR: 77 ML/MIN/1.73M2 — SIGNIFICANT CHANGE UP
EOSINOPHIL # BLD AUTO: 0.11 K/UL — SIGNIFICANT CHANGE UP (ref 0–0.5)
EOSINOPHIL NFR BLD AUTO: 1 % — SIGNIFICANT CHANGE UP (ref 0–6)
GLUCOSE BLDC GLUCOMTR-MCNC: 100 MG/DL — HIGH (ref 70–99)
GLUCOSE BLDC GLUCOMTR-MCNC: 138 MG/DL — HIGH (ref 70–99)
GLUCOSE BLDC GLUCOMTR-MCNC: 173 MG/DL — HIGH (ref 70–99)
GLUCOSE BLDC GLUCOMTR-MCNC: 174 MG/DL — HIGH (ref 70–99)
GLUCOSE SERPL-MCNC: 116 MG/DL — HIGH (ref 70–99)
HCT VFR BLD CALC: 49.6 % — SIGNIFICANT CHANGE UP (ref 39–50)
HGB BLD-MCNC: 16.6 G/DL — SIGNIFICANT CHANGE UP (ref 13–17)
IMM GRANULOCYTES NFR BLD AUTO: 0.5 % — SIGNIFICANT CHANGE UP (ref 0–0.9)
INR BLD: 2.58 RATIO — HIGH (ref 0.85–1.18)
LYMPHOCYTES # BLD AUTO: 1.52 K/UL — SIGNIFICANT CHANGE UP (ref 1–3.3)
LYMPHOCYTES # BLD AUTO: 13.4 % — SIGNIFICANT CHANGE UP (ref 13–44)
MAGNESIUM SERPL-MCNC: 1.9 MG/DL — SIGNIFICANT CHANGE UP (ref 1.6–2.6)
MCHC RBC-ENTMCNC: 32 PG — SIGNIFICANT CHANGE UP (ref 27–34)
MCHC RBC-ENTMCNC: 33.5 GM/DL — SIGNIFICANT CHANGE UP (ref 32–36)
MCV RBC AUTO: 95.6 FL — SIGNIFICANT CHANGE UP (ref 80–100)
MONOCYTES # BLD AUTO: 1.1 K/UL — HIGH (ref 0–0.9)
MONOCYTES NFR BLD AUTO: 9.7 % — SIGNIFICANT CHANGE UP (ref 2–14)
NEUTROPHILS # BLD AUTO: 8.54 K/UL — HIGH (ref 1.8–7.4)
NEUTROPHILS NFR BLD AUTO: 75.1 % — SIGNIFICANT CHANGE UP (ref 43–77)
NRBC # BLD: 0 /100 WBCS — SIGNIFICANT CHANGE UP (ref 0–0)
PHOSPHATE SERPL-MCNC: 2.3 MG/DL — LOW (ref 2.5–4.5)
PLATELET # BLD AUTO: 161 K/UL — SIGNIFICANT CHANGE UP (ref 150–400)
POTASSIUM SERPL-MCNC: 4.2 MMOL/L — SIGNIFICANT CHANGE UP (ref 3.5–5.3)
POTASSIUM SERPL-SCNC: 4.2 MMOL/L — SIGNIFICANT CHANGE UP (ref 3.5–5.3)
PROT SERPL-MCNC: 6.1 G/DL — SIGNIFICANT CHANGE UP (ref 6–8.3)
PROTHROM AB SERPL-ACNC: 28.6 SEC — HIGH (ref 9.5–13)
RBC # BLD: 5.19 M/UL — SIGNIFICANT CHANGE UP (ref 4.2–5.8)
RBC # FLD: 14.8 % — HIGH (ref 10.3–14.5)
SARS-COV-2 RNA SPEC QL NAA+PROBE: DETECTED
SODIUM SERPL-SCNC: 140 MMOL/L — SIGNIFICANT CHANGE UP (ref 135–145)
WBC # BLD: 11.36 K/UL — HIGH (ref 3.8–10.5)
WBC # FLD AUTO: 11.36 K/UL — HIGH (ref 3.8–10.5)

## 2024-03-25 PROCEDURE — 99232 SBSQ HOSP IP/OBS MODERATE 35: CPT | Mod: GC

## 2024-03-25 RX ORDER — MECLIZINE HCL 12.5 MG
1 TABLET ORAL
Qty: 0 | Refills: 0 | DISCHARGE
Start: 2024-03-25

## 2024-03-25 RX ORDER — INSULIN GLARGINE 100 [IU]/ML
12 INJECTION, SOLUTION SUBCUTANEOUS
Qty: 0 | Refills: 0 | DISCHARGE
Start: 2024-03-25

## 2024-03-25 RX ORDER — DONEPEZIL HYDROCHLORIDE 10 MG/1
1 TABLET, FILM COATED ORAL
Qty: 0 | Refills: 0 | DISCHARGE
Start: 2024-03-25

## 2024-03-25 RX ORDER — CARVEDILOL PHOSPHATE 80 MG/1
1 CAPSULE, EXTENDED RELEASE ORAL
Refills: 0 | DISCHARGE

## 2024-03-25 RX ORDER — WARFARIN SODIUM 2.5 MG/1
2 TABLET ORAL ONCE
Refills: 0 | Status: COMPLETED | OUTPATIENT
Start: 2024-03-25 | End: 2024-03-25

## 2024-03-25 RX ORDER — SODIUM,POTASSIUM PHOSPHATES 278-250MG
1 POWDER IN PACKET (EA) ORAL ONCE
Refills: 0 | Status: COMPLETED | OUTPATIENT
Start: 2024-03-25 | End: 2024-03-25

## 2024-03-25 RX ORDER — METOPROLOL TARTRATE 50 MG
0.5 TABLET ORAL
Qty: 0.5 | Refills: 0
Start: 2024-03-25

## 2024-03-25 RX ORDER — MEGESTROL ACETATE 40 MG/ML
400 SUSPENSION ORAL DAILY
Refills: 0 | Status: DISCONTINUED | OUTPATIENT
Start: 2024-03-25 | End: 2024-03-26

## 2024-03-25 RX ORDER — AMIODARONE HYDROCHLORIDE 400 MG/1
1 TABLET ORAL
Refills: 0 | DISCHARGE

## 2024-03-25 RX ORDER — HUMAN INSULIN 100 [IU]/ML
0 INJECTION, SUSPENSION SUBCUTANEOUS
Refills: 0 | DISCHARGE

## 2024-03-25 RX ORDER — INSULIN GLARGINE 100 [IU]/ML
40 INJECTION, SOLUTION SUBCUTANEOUS
Refills: 0 | DISCHARGE

## 2024-03-25 RX ADMIN — SENNA PLUS 2 TABLET(S): 8.6 TABLET ORAL at 21:37

## 2024-03-25 RX ADMIN — Medication 100 MILLIGRAM(S): at 07:26

## 2024-03-25 RX ADMIN — INSULIN GLARGINE 12 UNIT(S): 100 INJECTION, SOLUTION SUBCUTANEOUS at 08:25

## 2024-03-25 RX ADMIN — BRIMONIDINE TARTRATE 1 DROP(S): 2 SOLUTION/ DROPS OPHTHALMIC at 21:37

## 2024-03-25 RX ADMIN — Medication 100 MICROGRAM(S): at 05:07

## 2024-03-25 RX ADMIN — Medication 25 MILLIGRAM(S): at 06:06

## 2024-03-25 RX ADMIN — Medication 12.5 MILLIGRAM(S): at 06:06

## 2024-03-25 RX ADMIN — Medication 100 MILLIGRAM(S): at 11:05

## 2024-03-25 RX ADMIN — MEGESTROL ACETATE 400 MILLIGRAM(S): 40 SUSPENSION ORAL at 17:39

## 2024-03-25 RX ADMIN — SACUBITRIL AND VALSARTAN 1 TABLET(S): 24; 26 TABLET, FILM COATED ORAL at 17:00

## 2024-03-25 RX ADMIN — LATANOPROST 1 DROP(S): 0.05 SOLUTION/ DROPS OPHTHALMIC; TOPICAL at 21:36

## 2024-03-25 RX ADMIN — Medication 25 MILLIGRAM(S): at 21:37

## 2024-03-25 RX ADMIN — CLOPIDOGREL BISULFATE 75 MILLIGRAM(S): 75 TABLET, FILM COATED ORAL at 21:37

## 2024-03-25 RX ADMIN — Medication 100 MILLIGRAM(S): at 16:48

## 2024-03-25 RX ADMIN — Medication 1: at 17:04

## 2024-03-25 RX ADMIN — Medication 1 PACKET(S): at 11:05

## 2024-03-25 RX ADMIN — DONEPEZIL HYDROCHLORIDE 10 MILLIGRAM(S): 10 TABLET, FILM COATED ORAL at 21:37

## 2024-03-25 RX ADMIN — WARFARIN SODIUM 2 MILLIGRAM(S): 2.5 TABLET ORAL at 21:37

## 2024-03-25 RX ADMIN — SACUBITRIL AND VALSARTAN 1 TABLET(S): 24; 26 TABLET, FILM COATED ORAL at 06:06

## 2024-03-25 RX ADMIN — Medication 25 MILLIGRAM(S): at 13:04

## 2024-03-25 NOTE — DISCHARGE NOTE PROVIDER - NSDCCAREPROVSEEN_GEN_ALL_CORE_FT
Yordan, Vince Beltran, Bk Garner, Ronnie Eaton, Soledad Lazcano, Yo Roche, Edilson Kruger, Opal Braun, Jean Silva, Venecia Rivera, Darren Polanco, Jasmyn Jimenez, Suzy Malcolm, Efrain Milligan, Jad Mendoza, Aleks Johns, Wallace

## 2024-03-25 NOTE — DISCHARGE NOTE NURSING/CASE MANAGEMENT/SOCIAL WORK - NSDCPEFALRISK_GEN_ALL_CORE
For information on Fall & Injury Prevention, visit: [email or web address] OR  [email or web address] OR  https://www.cdc.gov/steadi/patient.html

## 2024-03-25 NOTE — DISCHARGE NOTE PROVIDER - NSDCCPCAREPLAN_GEN_ALL_CORE_FT
PRINCIPAL DISCHARGE DIAGNOSIS  Diagnosis: Fall  Assessment and Plan of Treatment: You presented with fall.      SECONDARY DISCHARGE DIAGNOSES  Diagnosis: Vertigo  Assessment and Plan of Treatment:     Diagnosis: Chronic CHF  Assessment and Plan of Treatment:     Diagnosis: CAD (coronary artery disease)  Assessment and Plan of Treatment:     Diagnosis: HLD (hyperlipidemia)  Assessment and Plan of Treatment:     Diagnosis: Diabetes  Assessment and Plan of Treatment:     Diagnosis: Transaminitis  Assessment and Plan of Treatment:     Diagnosis: HTN (hypertension)  Assessment and Plan of Treatment:     Diagnosis: Erythrocytosis  Assessment and Plan of Treatment:     Diagnosis: Chronic indwelling Starr catheter  Assessment and Plan of Treatment:     Diagnosis: 2019 novel coronavirus disease (COVID-19)  Assessment and Plan of Treatment:      PRINCIPAL DISCHARGE DIAGNOSIS  Diagnosis: Near syncope  Assessment and Plan of Treatment:       SECONDARY DISCHARGE DIAGNOSES  Diagnosis: Vertigo  Assessment and Plan of Treatment: You have history of vertigo. You were started on Meclizine 25mg three times a day. Please continue taking medication as prescribed.    Diagnosis: Transaminitis  Assessment and Plan of Treatment: Your presented with deranged liver function. Workup was negative for Hepatitis.    Diagnosis: Chronic CHF  Assessment and Plan of Treatment: You have a history of congestive heart failure. You were taking Entresto, Carvedilol, Spironolactone and Lasix. Please continue taking entresto, spironolactone and lasix. Your Carvedilol is changed to Metroprolol. Please take Metorprolol 12.5mg once daily.  Please follow up with your PCP/ Cardiologist 1-2 weeks following discharge.    Diagnosis: HTN (hypertension)  Assessment and Plan of Treatment: You have a history of Hypertension.  On this admission, your carvedilol is changed to Metroprolol. Please take Metorprolol 12.5mg once daily.  Your blood pressure target is 120-140/80-90, maintain healthy lifestyle, low salt diet, avoid fatty food or stay active as tolerated 30 mins X 3 times per week. Please take Metorprolol 12.5mg once daily.   Please follow up with your PCP/ Cardiologist 1-2 weeks following discharge      Diagnosis: CAD (coronary artery disease)  Assessment and Plan of Treatment: You have a history of Coronary artery disease. Continue taking Plavix. We discontinued atorvastatin given worsening liver function.    Diagnosis: HLD (hyperlipidemia)  Assessment and Plan of Treatment: You have history of hyperlipidemia. Please continue taking Febuxostat. Please discontinue taking Atorvastatin as your liver function was degrading.    Diagnosis: Diabetes  Assessment and Plan of Treatment: You have history of Diabetes. Please continue taking Farxiga and Ozempix. Please take 12units of Lantus in the morning.   Your HbA1c was 6.9 during this admission.  You need to continue monitoring your blood sugar levels closely and maintain healthy lifestyle by eating healthy diabetic regimen, weight loss and exercise regularly as tolerated.  Please continue to take your medications as prescribed.   Please follow up with your PCP/Endocrinologist within a week of discharge.      Diagnosis: Erythrocytosis  Assessment and Plan of Treatment: You presented with Hemoglobin of 17. It means you have high concentration of RBC in your blood. It could be secondary to Obstructve sleep apnea.  Please follow up with your PCP 1-2 weeks following discharge.    Diagnosis: Chronic indwelling Castellnao catheter  Assessment and Plan of Treatment: You have history of frequent urinary tract infection. Your castellano was replaced while in the hospital. Please follow up with your urologist outpt after discharge from the hospital.    Diagnosis: 2019 novel coronavirus disease (COVID-19)  Assessment and Plan of Treatment: Your presented with positive COVID test. You remained fairly asymptiomatic.     PRINCIPAL DISCHARGE DIAGNOSIS  Diagnosis: Near syncope  Assessment and Plan of Treatment: You presentedwith epidsode of near syncope, where you felt dizzy,lost your balance and hit you head on the floor. CT head was negative for any pathology.         SECONDARY DISCHARGE DIAGNOSES  Diagnosis: Vertigo  Assessment and Plan of Treatment: You have history of vertigo. You were started on Meclizine 25mg three times a day. Please continue taking medication as prescribed.    Diagnosis: Transaminitis  Assessment and Plan of Treatment: Your presented with deranged liver function. Workup was negative for Viral Hepatitis. Hepatology Dr Lazcano was consulted.   Please follow up with Dr Lazcano outpt    Diagnosis: Chronic CHF  Assessment and Plan of Treatment: You have a history of congestive heart failure. You were taking Entresto, Carvedilol, Spironolactone and Lasix. Please continue taking entresto, spironolactone and lasix. Your Carvedilol is changed to Metroprolol. Please take Metorprolol 12.5mg once daily.  Please follow up with your PCP/ Cardiologist 1-2 weeks following discharge.    Diagnosis: HTN (hypertension)  Assessment and Plan of Treatment: You have a history of Hypertension.  On this admission, your carvedilol is changed to Metroprolol. Please take Metorprolol 12.5mg once daily.  Your blood pressure target is 120-140/80-90, maintain healthy lifestyle, low salt diet, avoid fatty food or stay active as tolerated 30 mins X 3 times per week. Please take Metorprolol 12.5mg once daily.   Please follow up with your PCP/ Cardiologist 1-2 weeks following discharge      Diagnosis: CAD (coronary artery disease)  Assessment and Plan of Treatment: You have a history of Coronary artery disease. Continue taking Plavix. We discontinued atorvastatin given worsening liver function.    Diagnosis: HLD (hyperlipidemia)  Assessment and Plan of Treatment: You have history of hyperlipidemia. Please continue taking Febuxostat. Please discontinue taking Atorvastatin as your liver function was degrading.    Diagnosis: Diabetes  Assessment and Plan of Treatment: You have history of Diabetes. Please continue taking Farxiga and Ozempix. Please take 12units of Lantus in the morning.   Your HbA1c was 6.9 during this admission.  You need to continue monitoring your blood sugar levels closely and maintain healthy lifestyle by eating healthy diabetic regimen, weight loss and exercise regularly as tolerated.  Please continue to take your medications as prescribed.   Please follow up with your PCP/Endocrinologist within a week of discharge.      Diagnosis: Erythrocytosis  Assessment and Plan of Treatment: You presented with Hemoglobin of 17. It means you have high concentration of RBC in your blood. It could be secondary to Obstructve sleep apnea.  Please follow up with your PCP 1-2 weeks following discharge.    Diagnosis: Chronic indwelling Castellano catheter  Assessment and Plan of Treatment: You have history of frequent urinary tract infection. Your castellano was replaced while in the hospital. Please follow up with your urologist outpt after discharge from the hospital.    Diagnosis: 2019 novel coronavirus disease (COVID-19)  Assessment and Plan of Treatment: Your presented with positive COVID test. You remained fairly asymptiomatic.

## 2024-03-25 NOTE — PROGRESS NOTE ADULT - PROBLEM SELECTOR PLAN 11
warfarin
largely asymptomatic, chronic dry cough  -CT chest shows multifocal ground glass opacities  -saturating well on room air  -supportive care PRN  -proof of test on 3/1--> d/c isolation precautions
largely asymptomatic, chronic dry cough  -CT chest shows multifocal ground glass opacities  -saturating well on room air  -supportive care PRN

## 2024-03-25 NOTE — PROGRESS NOTE ADULT - PROBLEM SELECTOR PLAN 7
cont statin, plavix
cont amiodarone, warfarin
cont statin, plavix

## 2024-03-25 NOTE — PROGRESS NOTE ADULT - PROBLEM SELECTOR PLAN 5
p/w hbg 17.3, Hct 50.8, normal MCV  -repeat hbg downtrend to 15.9 then back up to 17.5-->17.7  -total iron 24, % saturation 11, Ferritin 1623-->1594, transferrin 124    -possibly 2ndry PCV, patient has LUCIA but excellent compliance with nocturnal CPAP per wife  -serum EPO WNL  -family mentioned possible familial presence of hemochromatosis (more likely to be related to cirrhosis than acute rise in LFTs)--->on f/u questioning patient's wife's son has hemochromatosis but is not blood related

## 2024-03-25 NOTE — PROGRESS NOTE ADULT - PROBLEM SELECTOR PLAN 4
hx of HFrEF with last EF 30% as per o/p documentation  -CT chest w/ multifocal GGOs, pulmonary edema vs covid  -currently euvolemic appearing on exam aside from mild rales, not in exacerbation  -cont home lasix, spironolactone, coreg  -restart Entresto per cardiology recs (monitor for hypotension  -switch carvedilol to metoprolol for less anti-hypertensive effect  -Cardiology Dr. Rivear consulted

## 2024-03-25 NOTE — PROGRESS NOTE ADULT - PROBLEM SELECTOR PROBLEM 11
Preventive measure
2019 novel coronavirus disease (COVID-19)

## 2024-03-25 NOTE — PROGRESS NOTE ADULT - PROBLEM SELECTOR PLAN 9
hx of DM insulin dependent, also on ozempic and faxiga  -A1c 6.9  -last dose of ozempic 3/10 (missed 3/17 dose)   -c/w12U lantus qAM  and SSI

## 2024-03-25 NOTE — PROGRESS NOTE ADULT - ASSESSMENT
77 yo M from home, ambulates with cane, with CAD x5 stents on warfarin and plavix, CHF (unk EF), PPM/AICD, chronic vertigo, DM2, chronic castellano, recurrent UTI, "bladder spot" requiring further o/p workup, presenting s/p fall backwards, admitted with UTI iso chronic castellano, mechanical fall, and covid positive on 03/08

## 2024-03-25 NOTE — DISCHARGE NOTE NURSING/CASE MANAGEMENT/SOCIAL WORK - NURSING SECTION COMPLETE
REFERRING PHYSICIAN: Sada Fernandez MD    DATE:  04/12/2022    PREOPERATIVE DIAGNOSES:  Cholecystitis, cholelithiasis, and morbid obesity.    POSTOPERATIVE DIAGNOSES:  Cholecystitis, cholelithiasis, and morbid obesity.    PROCEDURE:  Laparoscopic cholecystectomy.    SURGEON:  Sada Fernandez MD    ASSISTANT:  coty forrester csa.    ESTIMATED BLOOD LOSS:  Minimal.     Tolerated the procedure well.    COMPLICATIONS:  No immediate complications.     Instrument and lap count correct.    I should mention that the procedure took at least 2-1/2 times normal time spent because of the patient's morbid obesity causing a very complicated procedure and use of extra instruments and extra time in order to remove the gallbladder from the liver bed.  So, the patient is a 50-year-old morbidly obese gentleman with multiple medical problems, who comes in with a history of cholecystitis and cholelithiasis, and a history of revascularization because of the pain and after medical workup was completed, decision was made to proceed with laparoscopic possible open cholecystectomy.  The consent was signed.  The risks and benefits of the procedure including bleeding, infection, anesthesia complications, possibility of open conversion, surrounding organ injury, misdiagnosis, need for subsequent surgery, possibility of prolonged hospitalization, and cardiovascular and other complications were explained to the patient and the family.  They understood and they wanted to proceed.    PROCEDURE IN DETAIL:  The patient was appropriately preoped, brought to the operating room and placed on the operating room table in supine position.  General endotracheal anesthesia was induced.  He was prepped and draped in a sterile manner.  A standard supraumbilical incision was made after the fascia was infiltrated with 0.5% Marcaine with epinephrine.  2-0 Vicryl sutures were placed on 2 sides of the midline.  Supraumbilically, the fascia was identified.  A  small incision was made in the fascia.  The Kiki cannula was introduced under direct vision.  The liver was giant and there were multiple adhesions.  The procedure also included extensive vascular adhesiolysis.  There were multiple adhesions  from the transverse colon going to the anterior abdominal midline.  They were obscuring the view and they had to be taken down very gradually with LigaSure in order to be able to visualize the gallbladder.  The gallbladder was edematous and the liver was giant, and the gallbladder was visualized elevated over the liver.  We placed the patient in reverse Trendelenburg position with a great degree of difficulty because of the giant size of the patient and difficulty with the elevation of the abdominal wall and the patient himself.  Another 5 mm trocar was placed in the epigastrium to the right of the falciform ligament, and  in the right midclavicular and anterior axillary line respectively 2 fingerbreadths below the costal margin after the fascia was infiltrated with 0.5% Marcaine with epinephrine.  The gallbladder was grasped and retracted over the liver and again, we utilized additional instruments.  We utilized visual destruction and a 30-degree scope in order to identify the pedicle of the gallbladder.  Finally, the anterior-posterior fold of the peritoneum were stripped with gradual dissection and was complicated by lot of adipose tissue surrounding the Moises pouch and the pedicle of the gallbladder.  Finally, the junction of cystic duct with the gallbladder was identified.  The cystic duct was cleared superiorly and inferiorly.  The cholangiogram was not attempted because of the danger of losing the cystic duct and having to proceed to the open conversion.  So, the cystic duct was clipped and transected.  The cystic artery was identified, clipped and transected and the LigaSure was utilized to remove the gallbladder from the liver bed, which was accomplished in the  usual fashion with some degree of difficulty again because of the giant size of the liver, giant size of the patient, and necessary to manipulate tissue in multiple directions.  Finally, the gallbladder was placed in the EndoCatch bag, retrieved through infraumbilical port.  Everything was irrigated.  Hemostasis was achieved.  There was no evidence of bleeding, bile leak, or any other problems.  All the adhesions were taken down and the trocars were withdrawn under direct vision.  There was no bleeding from the trocar site.  The abdomen was desufflated.  The infraumbilical fascia was closed with 0 Vicryl in the interrupted  fashion.  Marcaine with epinephrine was infiltrated for postoperative pain control.  The wounds were irrigated.  Skin was closed with 4-0 Vicryl.  Steri-Strips were placed.  The patient tolerated the procedure well and was taken to the recovery room in good condition.        Dictated By:  Sada Fernandez MD        D:  04/12/2022 14:40:44  T:  04/12/2022 21:21:24  MED/katie  Job:  [id number]      cc:  Sada Fernandez MD     Patient/Caregiver provided printed discharge information.

## 2024-03-25 NOTE — DISCHARGE NOTE PROVIDER - HOSPITAL COURSE
79 yo M from home, ambulates with cane, with CAD x5 stents on warfarin and plavix, cardiac arrest in 2014, CHF (EF 30%), PPM/AICD, paroxysmal AF, CKD3, chronic vertigo, DM2, chronic Castellano, recurrent UTI, hypothyroid, hx mitral valve clipping, "bladder spot" requiring further o/p workup, presenting s/p fall backwards. Pt is a limited narrator. He says that he has a long history of vertigo, and that he got out of the shower, was at the sink, and got dizzy, lost his balance, when he fell backwards and hit back of his head against shower door. He denies any loc, convulsions, tongue biting, loss of bowel/bladder control.  As per o/p notes in HIE from Dr. Vieira, pt has rapid deterioration in overall neuro function with increased weakness, and apparently he has had decreasing motor skills and cannot dress himself anymore. CTH neg for acute pathology. TTE shows severely reduced ejection fraction and aortic root at the sinuses of Valsalva is dilated, PT consulted recommending BAIRON.  Patient reportedly has long history of vertigo, reports mild relief from Epley maneuver. Started meclizine 25 mg q8h standing for now    Patient presented with transaminitis. CT abdomen shows hepatic cirrhosis morphology. Hepatology Dr. Lazcano consulted. Hepatitis Workup negative. Negative Hep A, B and CEBV PCR detectable but level below detection level, likely non specific. CMV PCR neg. HSV 1/2 PCR neg. CHASTITY neg. Held statin and amiodarone. Recommended to follow up outpt hepatology.      Patient has hx of HFrEF with last EF 30% as per o/p documentation. CT chest w/ multifocal GGOs, pulmonary edema vs covid. Was euvolemic appearing on exam aside from mild rales, not in exacerbation. Has h/o Afib (on Warfarin) Cont home lasix, spironolactone and entresto. Switched Carvedilol to Metoprolol for oess antihypertensive effect. Cardiology Dr. Rivera consulted.    Patient p/w Erythrocytosis. Hemoglobin was 17.3, Hct 50.8, normal MCV, total iron 24, % saturation 11, Ferritin 1623-->1594, transferrin 124. Possibly secondary PCV, patient has LUCIA but excellent compliance with nocturnal CPAP per wife. Serum EPO WNL. Family mentioned possible familial presence of hemochromatosis (more likely to be related to cirrhosis than acute rise in LFTs)--->on f/u questioning patient's wife's son has hemochromatosis but is not blood related. F/u hemochromatosis gene sequencing       Patient has history of frequent UTI with chronic castellano, p/w R sided CVA tenderness. Urinalysis was positive but asymptomatic. Completed Ceftriaxone 1g IV for 3 days. Castellano exchange while inpatient.         79 yo M from home, ambulates with cane, with CAD x5 stents on warfarin and plavix, cardiac arrest in 2014, CHF (EF 30%), PPM/AICD, paroxysmal AF, CKD3, chronic vertigo, DM2, chronic Castellano, recurrent UTI, hypothyroid, hx mitral valve clipping, "bladder spot" requiring further o/p workup, presenting s/p fall backwards. Pt is a limited narrator. He says that he has a long history of vertigo, and that he got out of the shower, was at the sink, and got dizzy, lost his balance, when he fell backwards and hit back of his head against shower door. He denies any loc, convulsions, tongue biting, loss of bowel/bladder control.  As per o/p notes in HIE from Dr. Vieira, pt has rapid deterioration in overall neuro function with increased weakness, and apparently he has had decreasing motor skills and cannot dress himself anymore. CTH neg for acute pathology. TTE shows severely reduced ejection fraction and aortic root at the sinuses of Valsalva is dilated, PT consulted recommending BAIRON.  Patient reportedly has long history of vertigo, reports mild relief from Epley maneuver. Started meclizine 25 mg q8h standing for now    Patient presented with transaminitis. CT abdomen shows hepatic cirrhosis morphology. Hepatology Dr. Lazcano consulted. Hepatitis Workup negative. Negative Hep A, B and CEBV PCR detectable but level below detection level, likely non specific. CMV PCR neg. HSV 1/2 PCR neg. CHASTITY neg. Held statin and amiodarone. Recommended to follow up outpt hepatology.      Patient has hx of HFrEF with last EF 30% as per o/p documentation. CT chest w/ multifocal GGOs, pulmonary edema vs covid. Was euvolemic appearing on exam aside from mild rales, not in exacerbation. Has h/o Afib (on Warfarin) Cont home lasix, spironolactone and entresto. Switched Carvedilol to Metoprolol for oess antihypertensive effect. Cardiology Dr. Rivera consulted.    Patient p/w Erythrocytosis. Hemoglobin was 17.3, Hct 50.8, normal MCV, total iron 24, % saturation 11, Ferritin 1623-->1594, transferrin 124. Possibly secondary PCV, patient has LUCIA but excellent compliance with nocturnal CPAP per wife. Serum EPO WNL. Family mentioned possible familial presence of hemochromatosis (more likely to be related to cirrhosis than acute rise in LFTs)--->on f/u questioning patient's wife's son has hemochromatosis but is not blood related. F/u hemochromatosis gene sequencing       Patient has history of frequent UTI with chronic castellano, p/w R sided CVA tenderness. Urinalysis was positive but asymptomatic. Completed Ceftriaxone 1g IV for 3 days. Castellano exchange while inpatient.    Patient is stable for discharge. Patient has been advised to follow up as outpatient. Case has been discussed with the attending.         77 yo M from home, ambulates with cane, with CAD x5 stents on warfarin and plavix, cardiac arrest in 2014, CHF (EF 30%), PPM/AICD, paroxysmal AF, CKD3, chronic vertigo, DM2, chronic Castellano, recurrent UTI, hypothyroid, hx mitral valve clipping, "bladder spot" requiring further o/p workup, presenting s/p fall backwards. Pt is a limited narrator. He says that he has a long history of vertigo, and that he got out of the shower, was at the sink, and got dizzy, lost his balance, when he fell backwards and hit back of his head against shower door. He denies any loc, convulsions, tongue biting, loss of bowel/bladder control.  As per o/p notes in HIE from Dr. Vieira, pt has rapid deterioration in overall neuro function with increased weakness, and apparently he has had decreasing motor skills and cannot dress himself anymore. CTH neg for acute pathology. TTE shows severely reduced ejection fraction and aortic root at the sinuses of Valsalva is dilated, PT consulted recommending BAIRON.  Patient reportedly has long history of vertigo, reports mild relief from Epley maneuver. Started meclizine 25 mg q8h standing for now    Patient presented with transaminitis. CT abdomen shows hepatic cirrhosis morphology. Hepatology Dr. Lazcano consulted. Hepatitis Workup negative. Negative Hep A, B and CEBV PCR detectable but level below detection level, likely non specific. CMV PCR neg. HSV 1/2 PCR neg. CHASTITY neg. Held statin and amiodarone. Recommended to follow up outpt hepatology.      Patient has hx of HFrEF with last EF 30% as per o/p documentation. CT chest w/ multifocal GGOs, pulmonary edema vs covid. Was euvolemic appearing on exam aside from mild rales, not in exacerbation. Has h/o Afib (on Warfarin) Cont home lasix, spironolactone and entresto. Switched Carvedilol to Metoprolol for oess antihypertensive effect. Cardiology Dr. Rivera consulted.    Patient p/w Erythrocytosis. Hemoglobin was 17.3, Hct 50.8, normal MCV, total iron 24, % saturation 11, Ferritin 1623-->1594, transferrin 124. Possibly secondary PCV, patient has LUCIA but excellent compliance with nocturnal CPAP per wife. Serum EPO WNL. Family mentioned possible familial presence of hemochromatosis (more likely to be related to cirrhosis than acute rise in LFTs)--->on f/u questioning patient's wife's son has hemochromatosis but is not blood related. F/u hemochromatosis gene sequencing       Patient has history of frequent UTI with chronic castellano, p/w R sided CVA tenderness. Urinalysis was positive but asymptomatic. Completed Ceftriaxone 1g IV for 3 days. Castellano exchange while inpatient.    Patient has Obstructive sleep apnea, uses CPAP at home. His settings are CPAP/ Expiratory pressure of 5cm H20, Fio2 40% and uses it from 10pm to 6am.    Patient is stable for discharge. Patient has been advised to follow up as outpatient. Case has been discussed with the attending.         79 yo M from home, ambulates with cane, with CAD x5 stents on warfarin and plavix, cardiac arrest in 2014, CHF (EF 30%), PPM/AICD, paroxysmal AF, CKD3, chronic vertigo, DM2, chronic Castellano, recurrent UTI, hypothyroid, hx mitral valve clipping, "bladder spot" requiring further o/p workup, presenting s/p fall backwards. Pt is a limited narrator. He says that he has a long history of vertigo, and that he got out of the shower, was at the sink, and got dizzy, lost his balance, when he fell backwards and hit back of his head against shower door. He denies any loc, convulsions, tongue biting, loss of bowel/bladder control.  As per o/p notes in HIE from Dr. Vieira, pt has rapid deterioration in overall neuro function with increased weakness, and apparently he has had decreasing motor skills and cannot dress himself anymore. CTH neg for acute pathology. TTE shows severely reduced ejection fraction and aortic root at the sinuses of Valsalva is dilated, PT consulted recommending BAIRON.  Patient reportedly has long history of vertigo, reports mild relief from Epley maneuver. Started meclizine 25 mg q8h standing for now    Patient presented with transaminitis. CT abdomen shows hepatic cirrhosis morphology. Hepatology Dr. Lazcano consulted. Hepatitis Workup negative. Negative Hep A, B and CEBV PCR detectable but level below detection level, likely non specific. CMV PCR neg. HSV 1/2 PCR neg. CHASTITY neg. Held statin and amiodarone. Recommended to follow up outpt hepatology.      Patient has hx of HFrEF with last EF 30% as per o/p documentation. CT chest w/ multifocal GGOs, pulmonary edema vs covid. Was euvolemic appearing on exam aside from mild rales, not in exacerbation. Has h/o Afib (on Warfarin) Cont home lasix, spironolactone and entresto. Switched Carvedilol to Metoprolol for oess antihypertensive effect. Cardiology Dr. Rivera consulted.    Patient p/w Erythrocytosis. Hemoglobin was 17.3, Hct 50.8, normal MCV, total iron 24, % saturation 11, Ferritin 1623-->1594, transferrin 124. Possibly secondary PCV, patient has LUCIA but excellent compliance with nocturnal CPAP per wife. Serum EPO WNL. Family mentioned possible familial presence of hemochromatosis (more likely to be related to cirrhosis than acute rise in LFTs)--->on f/u questioning patient's wife's son has hemochromatosis but is not blood related. F/u hemochromatosis gene sequencing       Patient has history of frequent UTI with chronic castellano, p/w R sided CVA tenderness. Urinalysis was positive but asymptomatic. Completed Ceftriaxone 1g IV for 3 days. Castellano exchanged while inpatient.    Patient has Obstructive sleep apnea, uses CPAP at home. His settings are CPAP/ Expiratory pressure of 5cm H20, Fio2 40% and uses it from 10pm to 6am.    Patient is stable for discharge. Patient has been advised to follow up as outpatient. Case has been discussed with the attending.

## 2024-03-25 NOTE — PROGRESS NOTE ADULT - PROBLEM SELECTOR PROBLEM 7
CAD (coronary artery disease)
Chronic atrial fibrillation
CAD (coronary artery disease)
CAD (coronary artery disease)

## 2024-03-25 NOTE — PROGRESS NOTE ADULT - PROBLEM SELECTOR PROBLEM 8
18 Month  Well Child Check    CONCERNS: None  Child accompanied by mother and brother    DIET:      Appetite: Good      Milk: soy based 3 CUPS/DAY      WATER: bottled  STOOLS: 2 / day  : [facility]  FAMILY MEMBERS: mom, dad and brother  PETS: 2 dog  RECENT ILLNESS: no    GROWTH & DEVELOPMENT:    Imitates housework: YES  Stacks 3-4 blocks: YES  Throws a ball: YES  Vocabulary of 15-20 words, uses 2 word phrases: NO  Follows simple directions: YES  Imitates words: YES  names &/or points to body parts: YES  Listens to story, points/names objects in picture: YES  Walks well: YES  Uses spoon & cup: YES  Scribbles: YES  Shows affection/ gives kisses: YES    Nursing notes reviewed and accepted.        Sterling Mclain is a 18 month old male who presents for 18 month well child exam.  Patient presents with Mother.    Concerns raised today include:  none     Birth history, medical history, surgical history, and family history reviewed and updated.    PHYSICAL EXAM:  Pulse 110, temperature 97.8 °F (36.6 °C), temperature source Temporal, resp. rate 28, height 33\" (83.8 cm), weight 12.8 kg, head circumference 47 cm (18.5\").  GENERAL:  Well appearing male child, nontoxic, no acute distress.  Alert and interactive.  SKIN:  Warm, normal turgor.  No cyanosis.  No bruises or lesions.  HEAD:  Normocephalic, atraumatic.    EYES:  Conjunctivae appear normal, non-injected, non-icteric.  EOMI (Extraocular movements intact), cover test normal.  NOSE:  Appears normal, no flaring.  EARS:  Normal pinnae.  TMs (Tympanic membranes) are transparent with good landmarks.  THROAT:  Oropharynx with moist mucous membranes and no lesions.  NECK:  Supple, no lymphadenopathy or masses.  HEART:  Regular rate and rhythm.  Quiet precordium.  Normal S1, S2.  No murmurs, rubs, gallops.   LUNGS:  Clear to auscultation bilaterally.  No wheezes, rales, rhonchi.  Normal work of breathing.  ABDOMEN:  Soft, nontender.  No organomegaly or 
masses.  GENITOURINARY:  Normal male, Testes descended bilaterally.   MUSCULOSKELETAL:  Hips within normal range of motion.  Spine straight.    EXTREMITIES:  Warm, dry, without abnormalities.  NEUROLOGIC:  Normal tone, bulk, strength.    ASSESSMENT:  18 month old male well child.    PLAN:  Well Child Check:  Meeting all milestones and doing well.  Routine vaccines were given after counseling.    Lactose Intolerance:  Gets diarrhea with a lot of dairy    All parental concerns and questions discussed.  Anticipatory guidance provided, handout given.              Development              Diet              Accident prevention:  Childproof home, water safety              Name and vocalize objects              Drink from cup              Analgesics/antipyretics              Sun exposure / insect repellent              Tobacco-free home              Dental care              Lead exposure risk:  None              Fluoride supplementation discussed  Immunizations per orders.  Risks, benefits, and side effects discussed.  RTC (Return to clinic) for 3 year old WCE (well child examination) or sooner prn illness/concerns.  
Chronic atrial fibrillation
Diabetes
Chronic atrial fibrillation

## 2024-03-25 NOTE — DISCHARGE NOTE NURSING/CASE MANAGEMENT/SOCIAL WORK - PATIENT PORTAL LINK FT
You can access the FollowMyHealth Patient Portal offered by [facility] by registering at the following website: [email or web address]. By joining [facility]’s FollowMyHealth portal, you will also be able to view your health information using other applications (apps) compatible with our system.

## 2024-03-25 NOTE — PHARMACOTHERAPY INTERVENTION NOTE - COMMENTS
INR downtrending to 2.58 today (last dose on 3/22), suggest to restart and dose with 2.5 mg this evening. INR downtrending to 2.58 today (last dose on 3/22), suggest to restart and dose with 2 mg this evening.

## 2024-03-25 NOTE — DISCHARGE NOTE PROVIDER - CARE PROVIDER_API CALL
[facility]  Internal Medicine  [address] [place] [id number]  Phone: [phone]  Fax: [phone]  Follow Up Time:

## 2024-03-25 NOTE — PROGRESS NOTE ADULT - PROBLEM SELECTOR PLAN 6
hx of recurrent UTIs w/ chronic castellano, p/w R sided CVA tenderness  -UA positive  -Currently asymptomatic   -WBC WNL  -continue with ceftriaxone 1g IV for 3 days  -frequent UTI is also a contraindication for farxiga, unclear why patient is still on it  -castellano exchanged while inpatient  -pt gets castellano changed at outpt urologist every month

## 2024-03-25 NOTE — PROGRESS NOTE ADULT - PROBLEM SELECTOR PROBLEM 10
HTN (hypertension)
2019 novel coronavirus disease (COVID-19)
HTN (hypertension)
HTN (hypertension)

## 2024-03-25 NOTE — PROGRESS NOTE ADULT - PROBLEM SELECTOR PLAN 2
reportedly has long history of vertigo currently not on meclizine with extensive w/u and interventions  -reports mild relief from Epley maneuver   -c/w meclizine 25 mg q8h standing for now  -PT eval  -Neuro consulted
U/a pos  f/u urine culture  Pt asymptomatic   WBC WNL  Hx frequent UTIs  Cont Ceftriaxone for now
reportedly has long history of vertigo currently not on meclizine with extensive w/u and interventions  -reports mild relief from Epley maneuver   -c/w meclizine 25 mg q8h standing for now  -PT eval  -Neuro consulted
reportedly has long history of vertigo currently not on meclizine with extensive w/u and interventions  -reports mild relief from Epley maneuver   -c/w meclizine 25 mg q8h standing for now  -PT eval  -Neuro consulted

## 2024-03-25 NOTE — DISCHARGE NOTE PROVIDER - NSDCMRMEDTOKEN_GEN_ALL_CORE_FT
amiodarone 400 mg oral tablet: 1 tab(s) orally once a day  brimonidine 0.15% ophthalmic solution: 1 drop(s) in each affected eye once a day (at bedtime)  cholecalciferol 400 intl units (10 mcg) oral tablet: 1 tab(s) orally once a day  clopidogrel 75 mg oral tablet: 1 tab(s) orally every other day last dose 8/6  Coreg 3.125 mg oral tablet: 1 tab(s) orally 2 times a day  Coumadin 4 mg oral tablet: 1 tab(s) orally once a day (at bedtime)  Coumadin 5 mg oral tablet: 1 tab(s) orally once a day (at bedtime)  Crestor 5 mg oral tablet: 0.5 tab(s) orally once a day (at bedtime)  Entresto 24 mg-26 mg oral tablet: 1 tab(s) orally 2 times a day  Farxiga 10 mg oral tablet: 1 tab(s) orally once a day  fenofibrate 160 mg oral tablet: 1 tab(s) orally once a day  HumuLIN N 100 units/mL subcutaneous suspension: subcutaneous as needed, per Dr. Bauer office. Per pt wife , pt does not take insulin.  Lantus Solostar Pen 100 units/mL subcutaneous solution: 40 unit(s) subcutaneous 2 times a day verified by Dr. Bauer office [phone]. per wife pt. does not take insulin  Lasix 40 mg oral tablet: 3 tab(s) orally once a day  latanoprost 0.005% ophthalmic solution: 1 drop(s) in each eye once a day (at bedtime)  Namenda 5 mg oral tablet: 1 tab(s) orally once a day  semaglutide 0.25 mg/0.5 mL (0.25 mg dose) subcutaneous solution: 0.25 milligram(s) subcutaneously once a week  spironolactone 25 mg oral tablet: 2 tab(s) orally once a day  Synthroid 100 mcg (0.1 mg) oral tablet: 1 tab(s) orally once a day   amiodarone 400 mg oral tablet: 1 tab(s) orally once a day  benzonatate 100 mg oral capsule: 1 cap(s) orally 3 times a day As needed Cough  brimonidine 0.15% ophthalmic solution: 1 drop(s) in each affected eye once a day (at bedtime)  cholecalciferol 400 intl units (10 mcg) oral tablet: 1 tab(s) orally once a day  clopidogrel 75 mg oral tablet: 1 tab(s) orally every other day last dose 8/6  Coumadin 4 mg oral tablet: 1 tab(s) orally once a day (at bedtime)  Coumadin 5 mg oral tablet: 1 tab(s) orally once a day (at bedtime)  donepezil 10 mg oral tablet: 1 tab(s) orally once a day (at bedtime)  Entresto 24 mg-26 mg oral tablet: 1 tab(s) orally 2 times a day  Farxiga 10 mg oral tablet: 1 tab(s) orally once a day  fenofibrate 160 mg oral tablet: 1 tab(s) orally once a day  HumuLIN N 100 units/mL subcutaneous suspension: subcutaneous as needed, per Dr. Bauer office. Per pt wife , pt does not take insulin.  Lantus Solostar Pen 100 units/mL subcutaneous solution: 40 unit(s) subcutaneous 2 times a day verified by Dr. Bauer office [phone]. per wife pt. does not take insulin  Lasix 40 mg oral tablet: 3 tab(s) orally once a day  latanoprost 0.005% ophthalmic solution: 1 drop(s) in each eye once a day (at bedtime)  meclizine 25 mg oral tablet: 1 tab(s) orally 3 times a day  Namenda 5 mg oral tablet: 1 tab(s) orally once a day  semaglutide 0.25 mg/0.5 mL (0.25 mg dose) subcutaneous solution: 0.25 milligram(s) subcutaneously once a week  spironolactone 25 mg oral tablet: 2 tab(s) orally once a day  Synthroid 100 mcg (0.1 mg) oral tablet: 1 tab(s) orally once a day   benzonatate 100 mg oral capsule: 1 cap(s) orally 3 times a day As needed Cough  brimonidine 0.15% ophthalmic solution: 1 drop(s) in each affected eye once a day (at bedtime)  cholecalciferol 400 intl units (10 mcg) oral tablet: 1 tab(s) orally once a day  clopidogrel 75 mg oral tablet: 1 tab(s) orally every other day last dose 8/6  Coumadin 4 mg oral tablet: 1 tab(s) orally once a day (at bedtime)  Coumadin 5 mg oral tablet: 1 tab(s) orally once a day (at bedtime)  donepezil 10 mg oral tablet: 1 tab(s) orally once a day (at bedtime)  Entresto 24 mg-26 mg oral tablet: 1 tab(s) orally 2 times a day  Farxiga 10 mg oral tablet: 1 tab(s) orally once a day  fenofibrate 160 mg oral tablet: 1 tab(s) orally once a day  insulin glargine 100 units/mL subcutaneous solution: 12 unit(s) subcutaneous  Lasix 40 mg oral tablet: 3 tab(s) orally once a day  latanoprost 0.005% ophthalmic solution: 1 drop(s) in each eye once a day (at bedtime)  meclizine 25 mg oral tablet: 1 tab(s) orally 3 times a day  Metoprolol Succinate ER 25 mg oral tablet, extended release: 0.5 tab(s) orally once a day  Namenda 5 mg oral tablet: 1 tab(s) orally once a day  semaglutide 0.25 mg/0.5 mL (0.25 mg dose) subcutaneous solution: 0.25 milligram(s) subcutaneously once a week  spironolactone 25 mg oral tablet: 2 tab(s) orally once a day  Synthroid 100 mcg (0.1 mg) oral tablet: 1 tab(s) orally once a day   benzonatate 100 mg oral capsule: 1 cap(s) orally 3 times a day As needed Cough  brimonidine 0.15% ophthalmic solution: 1 drop(s) in each affected eye once a day (at bedtime)  cholecalciferol 400 intl units (10 mcg) oral tablet: 1 tab(s) orally once a day  clopidogrel 75 mg oral tablet: 1 tab(s) orally every other day last dose 8/6  Coumadin 4 mg oral tablet: 1 tab(s) orally once a day (at bedtime)  Coumadin 5 mg oral tablet: 1 tab(s) orally once a day (at bedtime)  donepezil 10 mg oral tablet: 1 tab(s) orally once a day (at bedtime)  Entresto 24 mg-26 mg oral tablet: 1 tab(s) orally 2 times a day  Farxiga 10 mg oral tablet: 1 tab(s) orally once a day  fenofibrate 160 mg oral tablet: 1 tab(s) orally once a day  insulin glargine 100 units/mL subcutaneous solution: 12 unit(s) subcutaneous once a day (at bedtime)  Lasix 40 mg oral tablet: 3 tab(s) orally once a day  latanoprost 0.005% ophthalmic solution: 1 drop(s) in each eye once a day (at bedtime)  meclizine 25 mg oral tablet: 1 tab(s) orally 3 times a day  Metoprolol Succinate ER 25 mg oral tablet, extended release: 0.5 tab(s) orally once a day  Namenda 5 mg oral tablet: 1 tab(s) orally once a day  nystatin 100,000 units/g topical powder: 1 Apply topically to affected area every 8 hours  semaglutide 0.25 mg/0.5 mL (0.25 mg dose) subcutaneous solution: 0.25 milligram(s) subcutaneously once a week  spironolactone 25 mg oral tablet: 2 tab(s) orally once a day  Synthroid 100 mcg (0.1 mg) oral tablet: 1 tab(s) orally once a day

## 2024-03-25 NOTE — PROGRESS NOTE ADULT - PROBLEM SELECTOR PLAN 3
p/w ,  significantly elevated from baseline  -CT abdomen shows hepatic cirrhosis morphology  -ASA/acetaminophen levels normal  -Hepatology Dr. Lazcano consulted  -f/u Hep A IgM, HBsAg, HBcAb, HBsAb, HBcAb IgM, HCV RNA, Hep E IgM/PCR, CHASTITY, SMA, Ig panel, ferritin, iron/TIBC, salicylate level, Utox, Peth, EBV, CMV, HSV PCRs  -LFTs downtrending   -Outpatient elastography  -hold statin and amiodarone also in s/o bradycardia (has PPM, outpt. cardio Dr. Gilbert Shipman)

## 2024-03-25 NOTE — PROGRESS NOTE ADULT - SUBJECTIVE AND OBJECTIVE BOX
PGY-1 Progress Note discussed with attending    PAGER #: [[phone]] TILL 5:00 PM  PLEASE CONTACT ON CALL TEAM:  - On Call Team (Please refer to Primo) FROM 5:00 PM - 8:30PM  - Nightfloat Team FROM 8:30 -7:30 AM    INTERVAL HPI/OVERNIGHT EVENTS: No acute events overnight, offers no complaints. As per family      REVIEW OF SYSTEMS:  CONSTITUTIONAL: No fever, weight loss, or fatigue  RESPIRATORY: No cough, wheezing, chills or hemoptysis; No shortness of breath  CARDIOVASCULAR: No chest pain, palpitations, dizziness, or leg swelling  GASTROINTESTINAL: No abdominal pain. No nausea, vomiting, or hematemesis; No diarrhea or constipation. No melena or hematochezia.  GENITOURINARY: No dysuria or hematuria, urinary frequency  NEUROLOGICAL: No headaches, memory loss, loss of strength, numbness, or tremors  SKIN: No itching, burning, rashes, or lesions     Vital Signs Last 24 Hrs  T(C): 36.2 (25 Mar 2024 16:20), Max: 36.9 (25 Mar 2024 04:52)  T(F): 97.2 (25 Mar 2024 16:20), Max: 98.4 (25 Mar 2024 04:52)  HR: 56 (25 Mar 2024 16:58) (50 - 60)  BP: 123/63 (25 Mar 2024 16:58) (110/69 - 127/73)  BP(mean): 77 (25 Mar 2024 11:07) (75 - 77)  RR: 18 (25 Mar 2024 16:50) (18 - 18)  SpO2: 94% (25 Mar 2024 16:50) (90% - 99%)    Parameters below as of 25 Mar 2024 16:50  Patient On (Oxygen Delivery Method): room air        PHYSICAL EXAMINATION:  GENERAL: NAD, well built  HEAD:  Atraumatic, Normocephalic  EYES:  conjunctiva and sclera clear  NECK: Supple, No JVD, Normal thyroid  CHEST/LUNG: Clear to auscultation. Clear to percussion bilaterally; No rales, rhonchi, wheezing, or rubs  HEART: Regular rate and rhythm; No murmurs, rubs, or gallops  ABDOMEN: Soft, Nontender, Nondistended; Bowel sounds present  NERVOUS SYSTEM:  Alert & Oriented X3,    EXTREMITIES:  2+ Peripheral Pulses, No clubbing, cyanosis, or edema  SKIN: warm dry                          16.6   11.36 )-----------( 161      ( 25 Mar 2024 04:55 )             49.6     03-25    140  |  104  |  30<H>  ----------------------------<  116<H>  4.2   |  29  |  1.01    Ca    9.2      25 Mar 2024 04:55  Phos  2.3     03-25  Mg     1.9     03-25    TPro  6.1  /  Alb  2.3<L>  /  TBili  1.4<H>  /  DBili  0.6<H>  /  AST  95<H>  /  ALT  245<H>  /  AlkPhos  90  03-25    LIVER FUNCTIONS - ( 25 Mar 2024 04:55 )  Alb: 2.3 g/dL / Pro: 6.1 g/dL / ALK PHOS: 90 U/L / ALT: 245 U/L DA / AST: 95 U/L / GGT: x               PT/INR - ( 25 Mar 2024 04:55 )   PT: 28.6 sec;   INR: 2.58 ratio             CAPILLARY BLOOD GLUCOSE      RADIOLOGY & ADDITIONAL TESTS:                   PGY-1 Progress Note discussed with attending    PAGER #: [[phone]] TILL 5:00 PM  PLEASE CONTACT ON CALL TEAM:  - On Call Team (Please refer to Primo) FROM 5:00 PM - 8:30PM  - Nightfloat Team FROM 8:30 -7:30 AM    INTERVAL HPI/OVERNIGHT EVENTS: No acute events overnight, offers no complaints. As per family, pt has no appetite      REVIEW OF SYSTEMS:  CONSTITUTIONAL: No fever, weight loss, or fatigue  RESPIRATORY: No cough, wheezing, chills or hemoptysis; No shortness of breath  CARDIOVASCULAR: No chest pain, palpitations, dizziness, or leg swelling  GASTROINTESTINAL: No abdominal pain. No nausea, vomiting, or hematemesis; No diarrhea or constipation. No melena or hematochezia.  GENITOURINARY: No dysuria or hematuria, urinary frequency  NEUROLOGICAL: No headaches, memory loss, loss of strength, numbness, or tremors  SKIN: No itching, burning, rashes, or lesions     Vital Signs Last 24 Hrs  T(C): 36.2 (25 Mar 2024 16:20), Max: 36.9 (25 Mar 2024 04:52)  T(F): 97.2 (25 Mar 2024 16:20), Max: 98.4 (25 Mar 2024 04:52)  HR: 56 (25 Mar 2024 16:58) (50 - 60)  BP: 123/63 (25 Mar 2024 16:58) (110/69 - 127/73)  BP(mean): 77 (25 Mar 2024 11:07) (75 - 77)  RR: 18 (25 Mar 2024 16:50) (18 - 18)  SpO2: 94% (25 Mar 2024 16:50) (90% - 99%)    Parameters below as of 25 Mar 2024 16:50  Patient On (Oxygen Delivery Method): room air        PHYSICAL EXAMINATION:  General: WDWN, NAD  HEENT: NC/AT; PERRL, anicteric sclera; MMM  Neck: supple  Cardiovascular: +S1/S2; bradycardic, regular rhythm  Respiratory: CTA B/L; no W/R/R  Gastrointestinal: soft, NT/ND; +BSx4  Extremities: WWP; no edema, clubbing or cyanosis  Vascular: 2+ radial, DP/PT pulses B/L  Skin: Warm, dry, good turgor, no rashes, or ecchymoses  Neurological: AAOx3; no focal deficits                            16.6   11.36 )-----------( 161      ( 25 Mar 2024 04:55 )             49.6     03-25    140  |  104  |  30<H>  ----------------------------<  116<H>  4.2   |  29  |  1.01    Ca    9.2      25 Mar 2024 04:55  Phos  2.3     03-25  Mg     1.9     03-25    TPro  6.1  /  Alb  2.3<L>  /  TBili  1.4<H>  /  DBili  0.6<H>  /  AST  95<H>  /  ALT  245<H>  /  AlkPhos  90  03-25    LIVER FUNCTIONS - ( 25 Mar 2024 04:55 )  Alb: 2.3 g/dL / Pro: 6.1 g/dL / ALK PHOS: 90 U/L / ALT: 245 U/L DA / AST: 95 U/L / GGT: x               PT/INR - ( 25 Mar 2024 04:55 )   PT: 28.6 sec;   INR: 2.58 ratio             CAPILLARY BLOOD GLUCOSE      RADIOLOGY & ADDITIONAL TESTS:

## 2024-03-25 NOTE — DISCHARGE NOTE PROVIDER - ATTENDING DISCHARGE PHYSICAL EXAMINATION:
PHYSICAL EXAM:  GENERAL: NAD, well-developed  HEAD:  Atraumatic, Normocephalic  EYES:  conjunctiva and sclera clear  NECK: Supple, No JVD  CHEST/LUNG: Clear to auscultation bilaterally; No wheeze, rhonchi, rales  HEART: Regular rate and rhythm; No murmurs, rubs, or gallops, +PPM  ABDOMEN: Soft, Nontender, Nondistended; Bowel sounds present  EXTREMITIES:  2+ Peripheral Pulses, No clubbing, cyanosis, or edema  PSYCH: AAOx3, pleasant, cooperative  NEUROLOGY: non-focal  SKIN: +ecchymoses of RUE

## 2024-03-26 VITALS
TEMPERATURE: 97 F | SYSTOLIC BLOOD PRESSURE: 90 MMHG | DIASTOLIC BLOOD PRESSURE: 53 MMHG | OXYGEN SATURATION: 94 % | HEART RATE: 60 BPM | RESPIRATION RATE: 18 BRPM

## 2024-03-26 LAB
ALBUMIN SERPL ELPH-MCNC: 2.2 G/DL — LOW (ref 3.5–5)
ALP SERPL-CCNC: 89 U/L — SIGNIFICANT CHANGE UP (ref 40–120)
ALT FLD-CCNC: 190 U/L DA — HIGH (ref 10–60)
ANION GAP SERPL CALC-SCNC: 5 MMOL/L — SIGNIFICANT CHANGE UP (ref 5–17)
AST SERPL-CCNC: 74 U/L — HIGH (ref 10–40)
BILIRUB SERPL-MCNC: 1.5 MG/DL — HIGH (ref 0.2–1.2)
BUN SERPL-MCNC: 29 MG/DL — HIGH (ref 7–18)
CALCIUM SERPL-MCNC: 9 MG/DL — SIGNIFICANT CHANGE UP (ref 8.4–10.5)
CHLORIDE SERPL-SCNC: 108 MMOL/L — SIGNIFICANT CHANGE UP (ref 96–108)
CO2 SERPL-SCNC: 26 MMOL/L — SIGNIFICANT CHANGE UP (ref 22–31)
CREAT SERPL-MCNC: 0.73 MG/DL — SIGNIFICANT CHANGE UP (ref 0.5–1.3)
EGFR: 94 ML/MIN/1.73M2 — SIGNIFICANT CHANGE UP
GLUCOSE BLDC GLUCOMTR-MCNC: 114 MG/DL — HIGH (ref 70–99)
GLUCOSE SERPL-MCNC: 117 MG/DL — HIGH (ref 70–99)
HCT VFR BLD CALC: 49.9 % — SIGNIFICANT CHANGE UP (ref 39–50)
HGB BLD-MCNC: 16.7 G/DL — SIGNIFICANT CHANGE UP (ref 13–17)
INR BLD: 2.23 RATIO — HIGH (ref 0.85–1.18)
MAGNESIUM SERPL-MCNC: 2 MG/DL — SIGNIFICANT CHANGE UP (ref 1.6–2.6)
MCHC RBC-ENTMCNC: 32 PG — SIGNIFICANT CHANGE UP (ref 27–34)
MCHC RBC-ENTMCNC: 33.5 GM/DL — SIGNIFICANT CHANGE UP (ref 32–36)
MCV RBC AUTO: 95.6 FL — SIGNIFICANT CHANGE UP (ref 80–100)
NRBC # BLD: 0 /100 WBCS — SIGNIFICANT CHANGE UP (ref 0–0)
PHOSPHATE SERPL-MCNC: 2 MG/DL — LOW (ref 2.5–4.5)
PLATELET # BLD AUTO: 178 K/UL — SIGNIFICANT CHANGE UP (ref 150–400)
POTASSIUM SERPL-MCNC: 3.7 MMOL/L — SIGNIFICANT CHANGE UP (ref 3.5–5.3)
POTASSIUM SERPL-SCNC: 3.7 MMOL/L — SIGNIFICANT CHANGE UP (ref 3.5–5.3)
PROT SERPL-MCNC: 6 G/DL — SIGNIFICANT CHANGE UP (ref 6–8.3)
PROTHROM AB SERPL-ACNC: 24.8 SEC — HIGH (ref 9.5–13)
RBC # BLD: 5.22 M/UL — SIGNIFICANT CHANGE UP (ref 4.2–5.8)
RBC # FLD: 14.7 % — HIGH (ref 10.3–14.5)
SODIUM SERPL-SCNC: 139 MMOL/L — SIGNIFICANT CHANGE UP (ref 135–145)
WBC # BLD: 9.09 K/UL — SIGNIFICANT CHANGE UP (ref 3.8–10.5)
WBC # FLD AUTO: 9.09 K/UL — SIGNIFICANT CHANGE UP (ref 3.8–10.5)

## 2024-03-26 PROCEDURE — 99239 HOSP IP/OBS DSCHRG MGMT >30: CPT

## 2024-03-26 PROCEDURE — 83735 ASSAY OF MAGNESIUM: CPT

## 2024-03-26 PROCEDURE — 86038 ANTINUCLEAR ANTIBODIES: CPT

## 2024-03-26 PROCEDURE — 83605 ASSAY OF LACTIC ACID: CPT

## 2024-03-26 PROCEDURE — 80307 DRUG TEST PRSMV CHEM ANLYZR: CPT

## 2024-03-26 PROCEDURE — 84466 ASSAY OF TRANSFERRIN: CPT

## 2024-03-26 PROCEDURE — 93005 ELECTROCARDIOGRAM TRACING: CPT

## 2024-03-26 PROCEDURE — 83540 ASSAY OF IRON: CPT

## 2024-03-26 PROCEDURE — 70450 CT HEAD/BRAIN W/O DYE: CPT | Mod: MC

## 2024-03-26 PROCEDURE — 36415 COLL VENOUS BLD VENIPUNCTURE: CPT

## 2024-03-26 PROCEDURE — 80074 ACUTE HEPATITIS PANEL: CPT

## 2024-03-26 PROCEDURE — 86255 FLUORESCENT ANTIBODY SCREEN: CPT

## 2024-03-26 PROCEDURE — 85027 COMPLETE CBC AUTOMATED: CPT

## 2024-03-26 PROCEDURE — 82962 GLUCOSE BLOOD TEST: CPT

## 2024-03-26 PROCEDURE — 96374 THER/PROPH/DIAG INJ IV PUSH: CPT

## 2024-03-26 PROCEDURE — 86790 VIRUS ANTIBODY NOS: CPT

## 2024-03-26 PROCEDURE — 87635 SARS-COV-2 COVID-19 AMP PRB: CPT

## 2024-03-26 PROCEDURE — 87529 HSV DNA AMP PROBE: CPT

## 2024-03-26 PROCEDURE — 80048 BASIC METABOLIC PNL TOTAL CA: CPT

## 2024-03-26 PROCEDURE — 87086 URINE CULTURE/COLONY COUNT: CPT

## 2024-03-26 PROCEDURE — 94760 N-INVAS EAR/PLS OXIMETRY 1: CPT

## 2024-03-26 PROCEDURE — 87077 CULTURE AEROBIC IDENTIFY: CPT

## 2024-03-26 PROCEDURE — 80053 COMPREHEN METABOLIC PANEL: CPT

## 2024-03-26 PROCEDURE — 82668 ASSAY OF ERYTHROPOIETIN: CPT

## 2024-03-26 PROCEDURE — 82607 VITAMIN B-12: CPT

## 2024-03-26 PROCEDURE — 83550 IRON BINDING TEST: CPT

## 2024-03-26 PROCEDURE — 86850 RBC ANTIBODY SCREEN: CPT

## 2024-03-26 PROCEDURE — 86803 HEPATITIS C AB TEST: CPT

## 2024-03-26 PROCEDURE — 86706 HEP B SURFACE ANTIBODY: CPT

## 2024-03-26 PROCEDURE — 72125 CT NECK SPINE W/O DYE: CPT | Mod: MC

## 2024-03-26 PROCEDURE — 81256 HFE GENE: CPT

## 2024-03-26 PROCEDURE — G0480: CPT

## 2024-03-26 PROCEDURE — 83036 HEMOGLOBIN GLYCOSYLATED A1C: CPT

## 2024-03-26 PROCEDURE — 80076 HEPATIC FUNCTION PANEL: CPT

## 2024-03-26 PROCEDURE — 94660 CPAP INITIATION&MGMT: CPT

## 2024-03-26 PROCEDURE — 85025 COMPLETE CBC W/AUTO DIFF WBC: CPT

## 2024-03-26 PROCEDURE — C8929: CPT

## 2024-03-26 PROCEDURE — 86900 BLOOD TYPING SEROLOGIC ABO: CPT

## 2024-03-26 PROCEDURE — 84484 ASSAY OF TROPONIN QUANT: CPT

## 2024-03-26 PROCEDURE — 99285 EMERGENCY DEPT VISIT HI MDM: CPT | Mod: 25

## 2024-03-26 PROCEDURE — 85730 THROMBOPLASTIN TIME PARTIAL: CPT

## 2024-03-26 PROCEDURE — 85610 PROTHROMBIN TIME: CPT

## 2024-03-26 PROCEDURE — 87799 DETECT AGENT NOS DNA QUANT: CPT

## 2024-03-26 PROCEDURE — 97116 GAIT TRAINING THERAPY: CPT

## 2024-03-26 PROCEDURE — 87637 SARSCOV2&INF A&B&RSV AMP PRB: CPT

## 2024-03-26 PROCEDURE — 83880 ASSAY OF NATRIURETIC PEPTIDE: CPT

## 2024-03-26 PROCEDURE — 87521 HEPATITIS C PROBE&RVRS TRNSC: CPT

## 2024-03-26 PROCEDURE — 74177 CT ABD & PELVIS W/CONTRAST: CPT | Mod: MC

## 2024-03-26 PROCEDURE — 82728 ASSAY OF FERRITIN: CPT

## 2024-03-26 PROCEDURE — 82746 ASSAY OF FOLIC ACID SERUM: CPT

## 2024-03-26 PROCEDURE — 97530 THERAPEUTIC ACTIVITIES: CPT

## 2024-03-26 PROCEDURE — 97162 PT EVAL MOD COMPLEX 30 MIN: CPT

## 2024-03-26 PROCEDURE — 84443 ASSAY THYROID STIM HORMONE: CPT

## 2024-03-26 PROCEDURE — 84100 ASSAY OF PHOSPHORUS: CPT

## 2024-03-26 PROCEDURE — 83690 ASSAY OF LIPASE: CPT

## 2024-03-26 PROCEDURE — 71260 CT THORAX DX C+: CPT | Mod: MC

## 2024-03-26 PROCEDURE — 81001 URINALYSIS AUTO W/SCOPE: CPT

## 2024-03-26 PROCEDURE — 86901 BLOOD TYPING SEROLOGIC RH(D): CPT

## 2024-03-26 RX ORDER — NYSTATIN CREAM 100000 [USP'U]/G
1 CREAM TOPICAL EVERY 8 HOURS
Refills: 0 | Status: DISCONTINUED | OUTPATIENT
Start: 2024-03-26 | End: 2024-03-26

## 2024-03-26 RX ORDER — WARFARIN SODIUM 2.5 MG/1
2.5 TABLET ORAL ONCE
Refills: 0 | Status: DISCONTINUED | OUTPATIENT
Start: 2024-03-26 | End: 2024-03-26

## 2024-03-26 RX ORDER — NYSTATIN CREAM 100000 [USP'U]/G
1 CREAM TOPICAL
Qty: 0 | Refills: 0 | DISCHARGE
Start: 2024-03-26

## 2024-03-26 RX ORDER — SODIUM,POTASSIUM PHOSPHATES 278-250MG
1 POWDER IN PACKET (EA) ORAL
Refills: 0 | Status: DISCONTINUED | OUTPATIENT
Start: 2024-03-26 | End: 2024-03-26

## 2024-03-26 RX ADMIN — Medication 40 MILLIGRAM(S): at 06:54

## 2024-03-26 RX ADMIN — SPIRONOLACTONE 25 MILLIGRAM(S): 25 TABLET, FILM COATED ORAL at 06:55

## 2024-03-26 RX ADMIN — Medication 12.5 MILLIGRAM(S): at 06:55

## 2024-03-26 RX ADMIN — Medication 25 MILLIGRAM(S): at 06:55

## 2024-03-26 RX ADMIN — SACUBITRIL AND VALSARTAN 1 TABLET(S): 24; 26 TABLET, FILM COATED ORAL at 06:56

## 2024-03-26 RX ADMIN — Medication 100 MICROGRAM(S): at 06:00

## 2024-03-26 NOTE — PROGRESS NOTE ADULT - SUBJECTIVE AND OBJECTIVE BOX
Chief Complaint:  Patient is a 78y old  Male who presents with a chief complaint of UTI, fall (25 Mar 2024 12:59)      Reason for consult:    Interval Events:     Hospital Medications:  acetaminophen     Tablet .. 650 milliGRAM(s) Oral every 6 hours PRN  benzonatate 100 milliGRAM(s) Oral three times a day PRN  brimonidine 0.2% Ophthalmic Solution 1 Drop(s) Both EYES at bedtime  clopidogrel Tablet 75 milliGRAM(s) Oral at bedtime  donepezil 10 milliGRAM(s) Oral at bedtime  furosemide    Tablet 40 milliGRAM(s) Oral daily  guaiFENesin Oral Liquid (Sugar-Free) 100 milliGRAM(s) Oral every 6 hours PRN  insulin glargine Injectable (LANTUS) 12 Unit(s) SubCutaneous every morning  insulin lispro (ADMELOG) corrective regimen sliding scale   SubCutaneous three times a day before meals  insulin lispro (ADMELOG) corrective regimen sliding scale   SubCutaneous at bedtime  latanoprost 0.005% Ophthalmic Solution 1 Drop(s) Both EYES at bedtime  levothyroxine 100 MICROGram(s) Oral daily  meclizine 25 milliGRAM(s) Oral three times a day  megestrol Suspension 400 milliGRAM(s) Oral daily  melatonin 3 milliGRAM(s) Oral at bedtime PRN  metoprolol succinate ER 12.5 milliGRAM(s) Oral daily  ondansetron Injectable 4 milliGRAM(s) IV Push every 8 hours PRN  polyethylene glycol 3350 17 Gram(s) Oral daily  sacubitril 24 mG/valsartan 26 mG 1 Tablet(s) Oral two times a day  senna 2 Tablet(s) Oral at bedtime  spironolactone 25 milliGRAM(s) Oral daily      ROS:   General:  No  fevers, chills, night sweats, fatigue  Eyes:  Good vision, no reported pain  ENT:  No sore throat, pain, runny nose  CV:  No pain, palpitations  Pulm:  No dyspnea, cough  GI:  See HPI, otherwise negative  :  No  incontinence, nocturia  Muscle:  No pain, weakness  Neuro:  No memory problems  Psych:  No insomnia, mood problems, depression  Endocrine:  No polyuria, polydipsia, cold/heat intolerance  Heme:  No petechiae, ecchymosis, easy bruisability  Skin:  No rash    PHYSICAL EXAM:   Vital Signs:  Vital Signs Last 24 Hrs  T(C): 36.6 (26 Mar 2024 05:06), Max: 36.9 (25 Mar 2024 19:45)  T(F): 97.8 (26 Mar 2024 05:06), Max: 98.4 (25 Mar 2024 19:45)  HR: 55 (26 Mar 2024 06:52) (51 - 58)  BP: 117/56 (26 Mar 2024 06:52) (111/62 - 123/63)  BP(mean): 77 (25 Mar 2024 11:07) (77 - 77)  RR: 18 (26 Mar 2024 06:52) (16 - 18)  SpO2: 94% (26 Mar 2024 06:52) (91% - 95%)    Parameters below as of 26 Mar 2024 06:52  Patient On (Oxygen Delivery Method): room air      Daily     Daily     GENERAL: no acute distress  NEURO: alert, no asterixis  HEENT: anicteric sclera, no conjunctival pallor appreciated  CHEST: no respiratory distress, no accessory muscle use  CARDIAC: regular rate, rhythm  ABDOMEN: soft, non-tender, non-distended, no rebound or guarding  EXTREMITIES: warm, well perfused, no edema  SKIN: no lesions noted    LABS: reviewed                        16.7   9.09  )-----------( 178      ( 26 Mar 2024 07:38 )             49.9     03-25    140  |  104  |  30<H>  ----------------------------<  116<H>  4.2   |  29  |  1.01    Ca    9.2      25 Mar 2024 04:55  Phos  2.3     03-25  Mg     1.9     03-25    TPro  6.1  /  Alb  2.3<L>  /  TBili  1.4<H>  /  DBili  0.6<H>  /  AST  95<H>  /  ALT  245<H>  /  AlkPhos  90  03-25    LIVER FUNCTIONS - ( 25 Mar 2024 04:55 )  Alb: 2.3 g/dL / Pro: 6.1 g/dL / ALK PHOS: 90 U/L / ALT: 245 U/L DA / AST: 95 U/L / GGT: x             Interval Diagnostic Studies: see sunrise for full report

## 2024-03-26 NOTE — CHART NOTE - NSCHARTNOTEFT_GEN_A_CORE
Attempted to contact the patient's PCP, Dr. Agustin Bob, [phone], at [facility]. Received notice that the office is closed. Will try again in the AM.

## 2024-03-26 NOTE — PROGRESS NOTE ADULT - PROVIDER SPECIALTY LIST ADULT
Hepatology
Neurology
Cardiology
Hepatology
Hepatology
Internal Medicine
Internal Medicine
Cardiology
Hepatology
Cardiology
Internal Medicine

## 2024-03-26 NOTE — PROGRESS NOTE ADULT - REASON FOR ADMISSION
UTI, fall

## 2024-06-11 NOTE — PROGRESS NOTE ADULT - PROBLEM SELECTOR PLAN 4
Patient's IV input is 1150, 300 mL out via str, cath, Bladder scan now 98. Notified Dr. Brothers, no new orders, will continue monitoring.   hx of HFrEF with last EF 30% as per o/p documentation  -CT chest w/ multifocal GGOs, pulmonary edema vs covid  -currently euvolemic appearing on exam aside from mild rales, not in exacerbation  -cont home lasix, spironolactone, coreg  -restart Entresto as BP allows  -Cardiology Dr. Rivera consulted hx of HFrEF with last EF 30% as per o/p documentation  -CT chest w/ multifocal GGOs, pulmonary edema vs covid  -currently euvolemic appearing on exam aside from mild rales, not in exacerbation  -cont home lasix, spironolactone, coreg  -restart Entresto per cardiology recs (monitor for hypotension  -switch carvedilol to metoprolol for less anti-hypertensive effect  -Cardiology Dr. Rivera consulted

## 2024-09-23 ENCOUNTER — NON-APPOINTMENT (OUTPATIENT)
Age: 79
End: 2024-09-23

## 2024-09-24 ENCOUNTER — APPOINTMENT (OUTPATIENT)
Dept: NEUROLOGY | Facility: CLINIC | Age: 79
End: 2024-09-24
Payer: MEDICARE

## 2024-09-24 ENCOUNTER — NON-APPOINTMENT (OUTPATIENT)
Age: 79
End: 2024-09-24

## 2024-09-24 VITALS
WEIGHT: 196 LBS | HEIGHT: 70 IN | OXYGEN SATURATION: 99 % | TEMPERATURE: 98.3 F | SYSTOLIC BLOOD PRESSURE: 106 MMHG | BODY MASS INDEX: 28.06 KG/M2 | HEART RATE: 55 BPM | DIASTOLIC BLOOD PRESSURE: 66 MMHG

## 2024-09-24 DIAGNOSIS — Z00.00 ENCOUNTER FOR GENERAL ADULT MEDICAL EXAMINATION W/OUT ABNORMAL FINDINGS: ICD-10-CM

## 2024-09-24 DIAGNOSIS — R41.3 OTHER AMNESIA: ICD-10-CM

## 2024-09-24 PROCEDURE — 99215 OFFICE O/P EST HI 40 MIN: CPT

## 2024-09-24 RX ORDER — DONEPEZIL HYDROCHLORIDE 10 MG/1
10 TABLET ORAL
Qty: 90 | Refills: 1 | Status: ACTIVE | COMMUNITY
Start: 2024-09-24 | End: 1900-01-01

## 2025-02-04 ENCOUNTER — APPOINTMENT (OUTPATIENT)
Dept: NEUROLOGY | Facility: CLINIC | Age: 80
End: 2025-02-04